# Patient Record
Sex: MALE | Race: WHITE | NOT HISPANIC OR LATINO | Employment: OTHER | URBAN - METROPOLITAN AREA
[De-identification: names, ages, dates, MRNs, and addresses within clinical notes are randomized per-mention and may not be internally consistent; named-entity substitution may affect disease eponyms.]

---

## 2022-03-14 RX ORDER — SODIUM, POTASSIUM,MAG SULFATES 17.5-3.13G
SOLUTION, RECONSTITUTED, ORAL ORAL
Qty: 354 ML | Refills: 0 | Status: SHIPPED | OUTPATIENT
Start: 2022-03-14

## 2022-03-23 ENCOUNTER — OUTSIDE FACILITY SERVICE (OUTPATIENT)
Dept: GASTROENTEROLOGY | Facility: CLINIC | Age: 77
End: 2022-03-23

## 2022-03-23 PROCEDURE — 88305 TISSUE EXAM BY PATHOLOGIST: CPT | Performed by: INTERNAL MEDICINE

## 2022-03-23 PROCEDURE — 45385 COLONOSCOPY W/LESION REMOVAL: CPT | Performed by: INTERNAL MEDICINE

## 2022-03-24 ENCOUNTER — LAB REQUISITION (OUTPATIENT)
Dept: LAB | Facility: HOSPITAL | Age: 77
End: 2022-03-24

## 2022-03-24 DIAGNOSIS — D12.2 BENIGN NEOPLASM OF ASCENDING COLON: ICD-10-CM

## 2022-03-24 DIAGNOSIS — D12.5 BENIGN NEOPLASM OF SIGMOID COLON: ICD-10-CM

## 2022-03-24 DIAGNOSIS — Z12.11 ENCOUNTER FOR SCREENING FOR MALIGNANT NEOPLASM OF COLON: ICD-10-CM

## 2022-03-24 DIAGNOSIS — K64.8 OTHER HEMORRHOIDS: ICD-10-CM

## 2022-03-24 DIAGNOSIS — Z86.010 PERSONAL HISTORY OF COLONIC POLYPS: ICD-10-CM

## 2022-03-26 LAB
CYTO UR: NORMAL
LAB AP CASE REPORT: NORMAL
LAB AP CLINICAL INFORMATION: NORMAL
PATH REPORT.FINAL DX SPEC: NORMAL
PATH REPORT.GROSS SPEC: NORMAL

## 2022-08-30 ENCOUNTER — OFFICE VISIT (OUTPATIENT)
Dept: UROLOGY | Facility: CLINIC | Age: 77
End: 2022-08-30

## 2022-08-30 VITALS
BODY MASS INDEX: 22.1 KG/M2 | WEIGHT: 145.8 LBS | TEMPERATURE: 98.7 F | HEART RATE: 80 BPM | OXYGEN SATURATION: 99 % | HEIGHT: 68 IN | SYSTOLIC BLOOD PRESSURE: 126 MMHG | DIASTOLIC BLOOD PRESSURE: 80 MMHG

## 2022-08-30 DIAGNOSIS — R97.20 ELEVATED PROSTATE SPECIFIC ANTIGEN (PSA): Primary | ICD-10-CM

## 2022-08-30 LAB
BILIRUB BLD-MCNC: NEGATIVE MG/DL
CLARITY, POC: CLEAR
COLOR UR: ABNORMAL
EXPIRATION DATE: ABNORMAL
GLUCOSE UR STRIP-MCNC: NEGATIVE MG/DL
KETONES UR QL: ABNORMAL
LEUKOCYTE EST, POC: NEGATIVE
Lab: ABNORMAL
NITRITE UR-MCNC: NEGATIVE MG/ML
PH UR: 6 [PH] (ref 5–8)
PROT UR STRIP-MCNC: ABNORMAL MG/DL
RBC # UR STRIP: NEGATIVE /UL
SP GR UR: 1.02 (ref 1–1.03)
UROBILINOGEN UR QL: NORMAL

## 2022-08-30 PROCEDURE — 99204 OFFICE O/P NEW MOD 45 MIN: CPT | Performed by: PHYSICIAN ASSISTANT

## 2022-08-30 PROCEDURE — 51798 US URINE CAPACITY MEASURE: CPT | Performed by: PHYSICIAN ASSISTANT

## 2022-08-30 PROCEDURE — 81003 URINALYSIS AUTO W/O SCOPE: CPT | Performed by: PHYSICIAN ASSISTANT

## 2022-08-30 RX ORDER — LOSARTAN POTASSIUM 25 MG/1
25 TABLET ORAL DAILY
COMMUNITY
Start: 2022-08-15

## 2022-08-30 RX ORDER — CIPROFLOXACIN 500 MG/1
500 TABLET, FILM COATED ORAL 2 TIMES DAILY
Qty: 6 TABLET | Refills: 0 | Status: SHIPPED | OUTPATIENT
Start: 2022-08-30

## 2022-08-30 RX ORDER — TRIAMCINOLONE ACETONIDE 0.25 MG/G
CREAM TOPICAL
COMMUNITY
Start: 2022-06-15

## 2022-08-30 RX ORDER — MAGNESIUM HYDROXIDE 1200 MG/15ML
1 LIQUID ORAL AS NEEDED
Qty: 1 ENEMA | Refills: 0 | Status: SHIPPED | OUTPATIENT
Start: 2022-08-30

## 2022-08-30 RX ORDER — CEPHALEXIN 500 MG/1
500 CAPSULE ORAL 2 TIMES DAILY
Qty: 6 CAPSULE | Refills: 0 | Status: SHIPPED | OUTPATIENT
Start: 2022-08-30 | End: 2022-09-02

## 2022-09-29 ENCOUNTER — PROCEDURE VISIT (OUTPATIENT)
Dept: UROLOGY | Facility: CLINIC | Age: 77
End: 2022-09-29

## 2022-09-29 DIAGNOSIS — R97.20 ELEVATED PROSTATE SPECIFIC ANTIGEN (PSA): Primary | ICD-10-CM

## 2022-09-29 PROCEDURE — 55700 PR PROSTATE NEEDLE BIOPSY ANY APPROACH: CPT | Performed by: UROLOGY

## 2022-09-29 PROCEDURE — 76942 ECHO GUIDE FOR BIOPSY: CPT | Performed by: UROLOGY

## 2022-09-29 NOTE — PROGRESS NOTES
TRUS BIOPSY OF PROSTATE    Preoperative diagnosis  Elevated PSA    Postoperative diagnosis  Elevated PSA    Procedure  1.  Transrectal ultrasound of the prostate  2.  Transrectal ultrasound guidance of needle biopsy  3.  Prostate biopsy    Attending Surgeon  Vasiliy Vega MD    Anesthesia  2% lidocaine jelly, intrarectal instillation, 10mL  1% lidocaine solution, periprostatic injection, 10mL    Complications  None    Specimen  Prostate biopsy x 14    Indications  Mr. Howard is a 76 y.o. year old male with an elevated PSA: No results found for: PSA        After discussing his options, the patient decided to proceed with prostate biopsy.  Informed consent was obtained. Possible complications were discussed with the patient during his last visit including, but not limited to, hematuria, hematochezia, prostatitis, urinary tract infection, sepsis, and urinary retention.  He did start the prescribed oral antibiotic regimen.    Procedure  The patient was positioned and prepped in a left lateral position with lower extremities flexed.  Lidocaine jelly, 2%, was injected per rectum. A digital rectal exam was performed.The gopogo E8CS rectal ultrasound probe was slowly introduced into the rectum without difficulty.  The prostate and seminal vesicles were inspected systematically using cross and sagittal views with the ultrasound.  The dimensions of the prostate were measured, for a calculated volume of 36 mL.  Using a true cut 14 Fr biopsy needle, 14 prostate cores were collected. The specific locations were the following: left lateral base, left lateral mid, left lateral apex, left medial base, left medial mid, and left medial apex, right lateral base, right lateral mid, right lateral apex, right medial base, right medial mid, right medial apex, and right and left transition zones. The rectal ultrasound probe was removed.  A URI was again performed revealing little blood in the rectum.  The patient tolerated the procedure  well.    Plan  1.  The patient was instructed to drink plenty of fluids and warned about possible complications and side effects including, but not limited to, blood in the urine, stool and semen as well as bloodstream infection.  He was instructed to call the office if there are any issues, especially fevers or flu-like symptoms.    2.  Continue antibiotic for a total of 3 days.  3.  The patient will return to clinic in approximately 1 week for discussion of the pathological report.

## 2022-10-07 ENCOUNTER — OFFICE VISIT (OUTPATIENT)
Dept: UROLOGY | Facility: CLINIC | Age: 77
End: 2022-10-07

## 2022-10-07 DIAGNOSIS — C61 PROSTATE CANCER: Primary | ICD-10-CM

## 2022-10-07 PROCEDURE — 99443 PR PHYS/QHP TELEPHONE EVALUATION 21-30 MIN: CPT | Performed by: UROLOGY

## 2022-10-07 NOTE — PROGRESS NOTES
21 minute telephone conversation    DATE COLLECTED      DATE RECEIVED      DATE REPORTED   09/30/2022          09/30/2022         10/03/2022     DIAGNOSIS:   A.   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   PROSTATE, NEEDLE CORE BIOPSY, RIGHT MID:   Prostatic adenocarcinoma, Augusta score 4+4 = 8 (grade group 4),   involving <5% of 1/2 cores   C.   PROSTATE, NEEDLE CORE BIOPSY, RIGHT APEX:   Benign prostate tissue   D.   PROSTATE, NEEDLE CORE BIOPSY, LEFT BASE:   Prostatic adenocarcinoma, Rhina score 4+5 = 9 (grade group 5),   involving 70% and 5% of 2/2 cores   Perineural invasion is present   E.   PROSTATE, NEEDLE CORE BIOPSY, LEFT MID:   Prostatic adenocarcinoma, Augusta score 4+5 = 9 (grade group 5),   involving 75% of 2/2 cores   F.   PROSTATE, NEEDLE CORE BIOPSY, LEFT APEX:   Prostatic adenocarcinoma, Rhina score 4+4 = 8 (grade group 4),   involving 1% of 1/2 cores   G.   PROSTATE, NEEDLE CORE BIOPSY, LEFT TRANSZONE:   Prostatic adenocarcinoma, Augusta score 3+4 = 7 (grade group 2),   involving 20% of 1/1 core   Perineural invasion is focally present   H.   PROSTATE, NEEDLE CORE BIOPSY, RIGHT TRANSZONE:   Benign prostate tissue     No results found for: PSA his PSA from outside hospital was 12 prior to biopsy.    We reviewed the patient's new diagnosis of high risk prostate cancer.  We discussed the neck step is metastatic imaging survey, followed by discussion of treatment options, which we engaged in the beginning of today.  He is somewhat advanced age, but is overall quite healthy.  I think either option of surgery or radiation is reasonable.    CT and bone scan  Referral to radiation oncology Dr. Massey and robotic surgery Dr. Cleveland Jurado at .  Follow-up with me after to discuss decision    You have chosen to receive care through a telephone visit. Do you  consent to use a telephone visit for your medical care today? Yes

## 2022-10-27 ENCOUNTER — HOSPITAL ENCOUNTER (OUTPATIENT)
Dept: NUCLEAR MEDICINE | Facility: HOSPITAL | Age: 77
Discharge: HOME | End: 2022-10-27

## 2022-10-27 ENCOUNTER — HOSPITAL ENCOUNTER (OUTPATIENT)
Dept: CT IMAGING | Facility: HOSPITAL | Age: 77
Discharge: HOME | End: 2022-10-27
Admitting: UROLOGY

## 2022-10-27 DIAGNOSIS — C61 PROSTATE CANCER: ICD-10-CM

## 2022-10-27 PROCEDURE — 0 TECHNETIUM MEDRONATE KIT: Performed by: UROLOGY

## 2022-10-27 PROCEDURE — 72193 CT PELVIS W/DYE: CPT

## 2022-10-27 PROCEDURE — 78306 BONE IMAGING WHOLE BODY: CPT

## 2022-10-27 PROCEDURE — 25010000002 IOPAMIDOL 61 % SOLUTION: Performed by: UROLOGY

## 2022-10-27 PROCEDURE — A9503 TC99M MEDRONATE: HCPCS | Performed by: UROLOGY

## 2022-10-27 RX ORDER — TC 99M MEDRONATE 20 MG/10ML
25 INJECTION, POWDER, LYOPHILIZED, FOR SOLUTION INTRAVENOUS
Status: COMPLETED | OUTPATIENT
Start: 2022-10-27 | End: 2022-10-27

## 2022-10-27 RX ADMIN — IOPAMIDOL 100 ML: 612 INJECTION, SOLUTION INTRAVENOUS at 11:07

## 2022-10-27 RX ADMIN — TC 99M MEDRONATE 25 MILLICURIE: 20 INJECTION, POWDER, LYOPHILIZED, FOR SOLUTION INTRAVENOUS at 10:50

## 2022-11-10 ENCOUNTER — TRANSCRIBE ORDERS (OUTPATIENT)
Dept: LAB | Facility: HOSPITAL | Age: 77
End: 2022-11-10

## 2022-11-10 DIAGNOSIS — Z01.812 PRE-OPERATIVE LABORATORY EXAMINATION: Primary | ICD-10-CM

## 2022-11-23 ENCOUNTER — TRANSCRIBE ORDERS (OUTPATIENT)
Dept: LAB | Facility: HOSPITAL | Age: 77
End: 2022-11-23

## 2022-11-23 DIAGNOSIS — Z01.812 PRE-OPERATIVE LABORATORY EXAMINATION: Primary | ICD-10-CM

## 2022-11-23 DIAGNOSIS — Z20.822 COUGH WITH EXPOSURE TO SEVERE ACUTE RESPIRATORY SYNDROME CORONAVIRUS 2 (SARS-COV-2): ICD-10-CM

## 2022-11-23 DIAGNOSIS — R05.8 COUGH WITH EXPOSURE TO SEVERE ACUTE RESPIRATORY SYNDROME CORONAVIRUS 2 (SARS-COV-2): ICD-10-CM

## 2022-11-28 ENCOUNTER — LAB (OUTPATIENT)
Dept: LAB | Facility: HOSPITAL | Age: 77
End: 2022-11-28

## 2022-11-28 DIAGNOSIS — R05.8 COUGH WITH EXPOSURE TO SEVERE ACUTE RESPIRATORY SYNDROME CORONAVIRUS 2 (SARS-COV-2): ICD-10-CM

## 2022-11-28 DIAGNOSIS — Z01.812 PRE-OPERATIVE LABORATORY EXAMINATION: ICD-10-CM

## 2022-11-28 DIAGNOSIS — Z20.822 COUGH WITH EXPOSURE TO SEVERE ACUTE RESPIRATORY SYNDROME CORONAVIRUS 2 (SARS-COV-2): ICD-10-CM

## 2022-11-28 PROCEDURE — C9803 HOPD COVID-19 SPEC COLLECT: HCPCS

## 2022-11-28 PROCEDURE — U0004 COV-19 TEST NON-CDC HGH THRU: HCPCS

## 2022-11-29 LAB — SARS-COV-2 RNA PNL SPEC NAA+PROBE: NOT DETECTED

## 2023-01-05 LAB — REF LAB TEST METHOD: NORMAL

## 2023-08-21 ENCOUNTER — TRANSCRIBE ORDERS (OUTPATIENT)
Dept: ADMINISTRATIVE | Facility: HOSPITAL | Age: 78
End: 2023-08-21
Payer: MEDICARE

## 2023-08-21 DIAGNOSIS — C61 PROSTATE CANCER: Primary | ICD-10-CM

## 2023-09-12 ENCOUNTER — HOSPITAL ENCOUNTER (OUTPATIENT)
Dept: CT IMAGING | Facility: HOSPITAL | Age: 78
Discharge: HOME | End: 2023-09-12
Payer: MEDICARE

## 2023-09-12 ENCOUNTER — HOSPITAL ENCOUNTER (OUTPATIENT)
Dept: NUCLEAR MEDICINE | Facility: HOSPITAL | Age: 78
Discharge: HOME | End: 2023-09-12
Payer: MEDICARE

## 2023-09-12 DIAGNOSIS — C61 PROSTATE CANCER: ICD-10-CM

## 2023-09-12 PROCEDURE — 0 TECHNETIUM MEDRONATE KIT

## 2023-09-12 PROCEDURE — 78306 BONE IMAGING WHOLE BODY: CPT

## 2023-09-12 PROCEDURE — 71260 CT THORAX DX C+: CPT

## 2023-09-12 PROCEDURE — 25510000001 IOPAMIDOL 61 % SOLUTION

## 2023-09-12 PROCEDURE — 74177 CT ABD & PELVIS W/CONTRAST: CPT

## 2023-09-12 PROCEDURE — A9503 TC99M MEDRONATE: HCPCS

## 2023-09-12 RX ORDER — TC 99M MEDRONATE 20 MG/10ML
26.8 INJECTION, POWDER, LYOPHILIZED, FOR SOLUTION INTRAVENOUS
Status: COMPLETED | OUTPATIENT
Start: 2023-09-12 | End: 2023-09-12

## 2023-09-12 RX ADMIN — IOPAMIDOL 100 ML: 612 INJECTION, SOLUTION INTRAVENOUS at 10:02

## 2023-09-12 RX ADMIN — TC 99M MEDRONATE 26.8 MILLICURIE: 20 INJECTION, POWDER, LYOPHILIZED, FOR SOLUTION INTRAVENOUS at 10:12

## 2024-01-03 DIAGNOSIS — C61 PROSTATE CANCER: Primary | ICD-10-CM

## 2024-01-05 ENCOUNTER — TELEPHONE (OUTPATIENT)
Dept: UROLOGY | Facility: CLINIC | Age: 79
End: 2024-01-05
Payer: MEDICARE

## 2024-01-05 NOTE — TELEPHONE ENCOUNTER
Caller: LINSEY HEREDIA    Relationship: SELF     Best call back number: [phone]    Patient is needing: PT SAYS HE WAS TOLD BY HIS PCP TO ADVISE YOU ALL THAT HE NEEDS TO GET A HORMONE SHOT FROM YOU ALL AT HIS UPCOMING APPT.

## 2024-02-08 ENCOUNTER — OFFICE VISIT (OUTPATIENT)
Dept: UROLOGY | Facility: CLINIC | Age: 79
End: 2024-02-08
Payer: MEDICARE

## 2024-02-08 ENCOUNTER — LAB (OUTPATIENT)
Dept: LAB | Facility: HOSPITAL | Age: 79
End: 2024-02-08
Payer: MEDICARE

## 2024-02-08 VITALS
OXYGEN SATURATION: 98 % | HEIGHT: 68 IN | BODY MASS INDEX: 21.52 KG/M2 | TEMPERATURE: 97.5 F | RESPIRATION RATE: 16 BRPM | DIASTOLIC BLOOD PRESSURE: 82 MMHG | HEART RATE: 68 BPM | SYSTOLIC BLOOD PRESSURE: 132 MMHG | WEIGHT: 142 LBS

## 2024-02-08 DIAGNOSIS — N52.9 ERECTILE DYSFUNCTION, UNSPECIFIED ERECTILE DYSFUNCTION TYPE: ICD-10-CM

## 2024-02-08 DIAGNOSIS — C61 PROSTATE CANCER: Primary | ICD-10-CM

## 2024-02-08 PROCEDURE — 84153 ASSAY OF PSA TOTAL: CPT | Performed by: UROLOGY

## 2024-02-08 RX ORDER — LISINOPRIL 20 MG/1
1 TABLET ORAL DAILY
COMMUNITY

## 2024-02-08 RX ORDER — ASPIRIN 81 MG/1
TABLET, CHEWABLE ORAL
COMMUNITY

## 2024-02-08 RX ORDER — SILDENAFIL 100 MG/1
100 TABLET, FILM COATED ORAL AS NEEDED
Qty: 30 TABLET | Refills: 11 | Status: SHIPPED | OUTPATIENT
Start: 2024-02-08

## 2024-02-08 NOTE — PROGRESS NOTES
"CC  Prostate cancer    HPI  Mr. Howard is a 78 y.o. male with history below in assessment, who presents for follow up.     At this visit energy is good, eating ok. He says he has \"somewhat\" erections but aren't hard enough to penetrate. Sex drive is good. No BANDAR.  Only had one Lupron shot in November.     Past Medical History:   Diagnosis Date    Elevated PSA        History reviewed. No pertinent surgical history.      Current Outpatient Medications:     losartan (COZAAR) 25 MG tablet, Take 1 tablet by mouth Daily., Disp: , Rfl:     metFORMIN (GLUCOPHAGE) 1000 MG tablet, Take 1 tablet by mouth., Disp: , Rfl:     triamcinolone (KENALOG) 0.025 % cream, APPLY TOPICALLY TO THE AFFECTED AREA 2 TO 3 TIMES DAILY, Disp: , Rfl:     aspirin 81 MG chewable tablet, Chew 1 tablet every day by oral route., Disp: , Rfl:     ciprofloxacin (Cipro) 500 MG tablet, Take 1 tablet by mouth 2 (Two) Times a Day. Start taking the day prior to biopsy (Patient not taking: Reported on 2/8/2024), Disp: 6 tablet, Rfl: 0    lisinopril (PRINIVIL,ZESTRIL) 20 MG tablet, Take 1 tablet by mouth Daily., Disp: , Rfl:     sodium phosphate (FLEET) 7-19 GM/118ML enema, Insert 1 enema into the rectum As Needed (2-4 hours prior to biopsy). Perform the morning before the biopsy (Patient not taking: Reported on 2/8/2024), Disp: 1 enema, Rfl: 0    sodium-potassium-magnesium sulfates (Suprep Bowel Prep Kit) 17.5-3.13-1.6 GM/177ML solution oral solution, Use as directed by provider for colonoscopy prep (Patient not taking: Reported on 2/8/2024), Disp: 354 mL, Rfl: 0     Physical Exam  Visit Vitals  /82   Pulse 68   Temp 97.5 °F (36.4 °C) (Temporal)   Resp 16   Ht 172.7 cm (68\")   Wt 64.4 kg (142 lb)   SpO2 98%   BMI 21.59 kg/m²       Labs  Brief Urine Lab Results       None            No results found for: \"GLUCOSE\", \"CALCIUM\", \"NA\", \"K\", \"CO2\", \"CL\", \"BUN\", \"CREATININE\", \"EGFRIFAFRI\", \"EGFRIFNONA\", \"BCR\", \"ANIONGAP\"    Lab Results   Component Value Date "    WBC 9.67 11/30/2022    HGB 12.0 (L) 11/30/2022    HCT 34.2 (L) 11/30/2022     (H) 11/30/2022     (L) 11/30/2022          Lab Results   Component Value Date    PSA 0.02 01/03/2024    PSA 0.18 05/10/2023    PSA 0.11 02/08/2023           Radiographic Studies  No Images in the past 120 days found..    I have reviewed above labs and imaging.     Assessment  78 y.o. male with high risk prostate cancer, diagnosed by me in October 2022.  I referred him to Dr. Jurado and he is status post radical prostatectomy with bilateral pelvic lymphadenectomy 11/29/2022, pathology pT3a N1, GG5 with negative margins. His initial post op PSA was detectable and he received an injection of Lupron in November and SBRT to the femoral neck lesion (seen on PSMA PET) only, and did not receive whole pelvis radiation. Minimal LUTS, no BANDAR, significant ED.     Doing well overall.  He had a PSA drawn today.  Energy and sex drive are good.    Plan  1. Schedule FU visit for 6 mo Lupron shot and discuss most recent PSA  2. Trial of sildenafil, though I discussed with him that we would likely need to do ICI or IPP for him to have satisfactory erections for intercourse with his girlfriend.  3.  I encouraged him to stop smoking cigarettes, but said he could keep drinking bourbon on occasion

## 2024-02-22 ENCOUNTER — OFFICE VISIT (OUTPATIENT)
Dept: UROLOGY | Facility: CLINIC | Age: 79
End: 2024-02-22
Payer: MEDICARE

## 2024-02-22 VITALS
DIASTOLIC BLOOD PRESSURE: 84 MMHG | WEIGHT: 142 LBS | HEIGHT: 68 IN | SYSTOLIC BLOOD PRESSURE: 118 MMHG | BODY MASS INDEX: 21.52 KG/M2 | OXYGEN SATURATION: 98 % | HEART RATE: 102 BPM

## 2024-02-22 DIAGNOSIS — N52.9 ERECTILE DYSFUNCTION, UNSPECIFIED ERECTILE DYSFUNCTION TYPE: ICD-10-CM

## 2024-02-22 DIAGNOSIS — C61 PROSTATE CANCER: Primary | ICD-10-CM

## 2024-02-22 RX ORDER — CAL/D3/MAG11/ZINC/COP/MANG/BOR 600 MG-800
1 TABLET ORAL DAILY
Qty: 90 TABLET | Refills: 4 | Status: SHIPPED | OUTPATIENT
Start: 2024-02-22

## 2024-02-22 NOTE — PROGRESS NOTES
"CC  Prostate cancer    HPI  Mr. Howard is a 78 y.o. male with history below in assessment, who presents for follow up.     At this visit energy is good, eating ok. He says he has \"somewhat\" erections but aren't hard enough to penetrate. Sex drive is good. No BANDAR.  Only had one Lupron shot in November.     Past Medical History:   Diagnosis Date    Elevated PSA        History reviewed. No pertinent surgical history.      Current Outpatient Medications:     aspirin 81 MG chewable tablet, Chew 1 tablet every day by oral route., Disp: , Rfl:     lisinopril (PRINIVIL,ZESTRIL) 20 MG tablet, Take 1 tablet by mouth Daily., Disp: , Rfl:     losartan (COZAAR) 25 MG tablet, Take 1 tablet by mouth Daily., Disp: , Rfl:     metFORMIN (GLUCOPHAGE) 1000 MG tablet, Take 1 tablet by mouth., Disp: , Rfl:     sildenafil (Viagra) 100 MG tablet, Take 1 tablet by mouth As Needed for Erectile Dysfunction (1 hr prior prior to intercourse w empty stomach)., Disp: 30 tablet, Rfl: 11    triamcinolone (KENALOG) 0.025 % cream, APPLY TOPICALLY TO THE AFFECTED AREA 2 TO 3 TIMES DAILY, Disp: , Rfl:     ciprofloxacin (Cipro) 500 MG tablet, Take 1 tablet by mouth 2 (Two) Times a Day. Start taking the day prior to biopsy (Patient not taking: Reported on 2/8/2024), Disp: 6 tablet, Rfl: 0    sodium phosphate (FLEET) 7-19 GM/118ML enema, Insert 1 enema into the rectum As Needed (2-4 hours prior to biopsy). Perform the morning before the biopsy (Patient not taking: Reported on 2/8/2024), Disp: 1 enema, Rfl: 0    sodium-potassium-magnesium sulfates (Suprep Bowel Prep Kit) 17.5-3.13-1.6 GM/177ML solution oral solution, Use as directed by provider for colonoscopy prep (Patient not taking: Reported on 2/8/2024), Disp: 354 mL, Rfl: 0     Physical Exam  Visit Vitals  /84   Pulse 102   Ht 172.7 cm (68\")   Wt 64.4 kg (142 lb)   SpO2 98%   BMI 21.59 kg/m²       Labs  Brief Urine Lab Results       None            No results found for: \"GLUCOSE\", \"CALCIUM\", " "\"NA\", \"K\", \"CO2\", \"CL\", \"BUN\", \"CREATININE\", \"EGFRIFAFRI\", \"EGFRIFNONA\", \"BCR\", \"ANIONGAP\"    Lab Results   Component Value Date    WBC 9.67 11/30/2022    HGB 12.0 (L) 11/30/2022    HCT 34.2 (L) 11/30/2022     (H) 11/30/2022     (L) 11/30/2022        Lab Results   Component Value Date    PSA <0.014 02/08/2024    PSA 0.02 01/03/2024    PSA 0.18 05/10/2023           Radiographic Studies  No Images in the past 120 days found..    I have reviewed above labs and imaging.     Assessment  78 y.o. male with high risk prostate cancer, diagnosed by me in October 2022.  I referred him to Dr. Jurado and he is status post radical prostatectomy with bilateral pelvic lymphadenectomy 11/29/2022, pathology pT3a N1, GG5 with negative margins. His initial post op PSA was detectable and he received an injection of Lupron in November and SBRT to the femoral neck lesion (seen on PSMA PET) only, and did not receive whole pelvis radiation. Minimal LUTS, no BANDAR, significant ED.     Doing well overall.  He had a PSA drawn at last visit, undetectable.  Energy and sex drive are good.    Plan  1.  6 mo Lupron shot today. FU in 6 mo w/ PSA prior  2.  Trial of sildenafil, though I discussed with him that we would likely need to do ICI or IPP for him to have satisfactory erections for intercourse with his girlfriend.  3.  I encouraged him to stop smoking cigarettes, but said he could keep drinking bourbon on occasion  4.  Calcium supplement     "

## 2024-02-22 NOTE — PROGRESS NOTES
45 mg lupron given to patient in left hip without any complications or redness to the site. NDC # 6917-5905-63, Lot # [id number], Exp 1/11/2026.     KEYONA Lorenzo

## 2024-03-20 ENCOUNTER — APPOINTMENT (OUTPATIENT)
Dept: CT IMAGING | Facility: HOSPITAL | Age: 79
End: 2024-03-20
Payer: MEDICARE

## 2024-03-20 ENCOUNTER — APPOINTMENT (OUTPATIENT)
Dept: GENERAL RADIOLOGY | Facility: HOSPITAL | Age: 79
End: 2024-03-20
Payer: MEDICARE

## 2024-03-20 ENCOUNTER — HOSPITAL ENCOUNTER (INPATIENT)
Facility: HOSPITAL | Age: 79
LOS: 6 days | Discharge: HOME HEALTH | End: 2024-03-27
Attending: EMERGENCY MEDICINE | Admitting: INTERNAL MEDICINE
Payer: MEDICARE

## 2024-03-20 DIAGNOSIS — Z85.46 HISTORY OF PROSTATE CANCER: ICD-10-CM

## 2024-03-20 DIAGNOSIS — R41.82 ALTERED MENTAL STATUS, UNSPECIFIED ALTERED MENTAL STATUS TYPE: Primary | ICD-10-CM

## 2024-03-20 DIAGNOSIS — R47.01 APHASIA: ICD-10-CM

## 2024-03-20 DIAGNOSIS — Z78.9 ALCOHOL USE: ICD-10-CM

## 2024-03-20 DIAGNOSIS — R13.10 DYSPHAGIA, UNSPECIFIED TYPE: ICD-10-CM

## 2024-03-20 DIAGNOSIS — R47.9 DIFFICULTY WITH SPEECH: ICD-10-CM

## 2024-03-20 DIAGNOSIS — E44.0 MODERATE MALNUTRITION: ICD-10-CM

## 2024-03-20 DIAGNOSIS — G93.41 ACUTE METABOLIC ENCEPHALOPATHY: ICD-10-CM

## 2024-03-20 PROBLEM — F10.90 ALCOHOL USE: Status: ACTIVE | Noted: 2024-03-20

## 2024-03-20 PROBLEM — I10 HTN (HYPERTENSION): Status: ACTIVE | Noted: 2024-03-20

## 2024-03-20 LAB
ALT SERPL W P-5'-P-CCNC: 27 U/L (ref 1–41)
APTT PPP: 28 SECONDS (ref 22–39)
AST SERPL-CCNC: 27 U/L (ref 1–40)
B PARAPERT DNA SPEC QL NAA+PROBE: NOT DETECTED
B PERT DNA SPEC QL NAA+PROBE: NOT DETECTED
BACTERIA UR QL AUTO: ABNORMAL /HPF
BASOPHILS # BLD AUTO: 0.03 10*3/MM3 (ref 0–0.2)
BASOPHILS NFR BLD AUTO: 0.4 % (ref 0–1.5)
BILIRUB UR QL STRIP: NEGATIVE
BUN BLDA-MCNC: 16 MG/DL (ref 8–26)
C PNEUM DNA NPH QL NAA+NON-PROBE: NOT DETECTED
CA-I BLDA-SCNC: 1.22 MMOL/L (ref 1.2–1.32)
CHLORIDE BLDA-SCNC: 99 MMOL/L (ref 98–109)
CLARITY UR: CLEAR
CO2 BLDA-SCNC: 22 MMOL/L (ref 24–29)
COLOR UR: YELLOW
CREAT BLDA-MCNC: 0.9 MG/DL (ref 0.6–1.3)
DEPRECATED RDW RBC AUTO: 44.9 FL (ref 37–54)
EGFRCR SERPLBLD CKD-EPI 2021: 87.4 ML/MIN/1.73
EOSINOPHIL # BLD AUTO: 0.01 10*3/MM3 (ref 0–0.4)
EOSINOPHIL NFR BLD AUTO: 0.1 % (ref 0.3–6.2)
ERYTHROCYTE [DISTWIDTH] IN BLOOD BY AUTOMATED COUNT: 11.9 % (ref 12.3–15.4)
ETHANOL BLD-MCNC: <10 MG/DL (ref 0–10)
FLUAV SUBTYP SPEC NAA+PROBE: NOT DETECTED
FLUBV RNA ISLT QL NAA+PROBE: NOT DETECTED
GLUCOSE BLDC GLUCOMTR-MCNC: 144 MG/DL (ref 70–130)
GLUCOSE UR STRIP-MCNC: NEGATIVE MG/DL
HADV DNA SPEC NAA+PROBE: NOT DETECTED
HCOV 229E RNA SPEC QL NAA+PROBE: NOT DETECTED
HCOV HKU1 RNA SPEC QL NAA+PROBE: NOT DETECTED
HCOV NL63 RNA SPEC QL NAA+PROBE: NOT DETECTED
HCOV OC43 RNA SPEC QL NAA+PROBE: NOT DETECTED
HCT VFR BLD AUTO: 40.1 % (ref 37.5–51)
HCT VFR BLDA CALC: 43 % (ref 38–51)
HGB BLD-MCNC: 14.1 G/DL (ref 13–17.7)
HGB BLDA-MCNC: 14.6 G/DL (ref 12–17)
HGB UR QL STRIP.AUTO: ABNORMAL
HMPV RNA NPH QL NAA+NON-PROBE: NOT DETECTED
HOLD SPECIMEN: NORMAL
HPIV1 RNA ISLT QL NAA+PROBE: NOT DETECTED
HPIV2 RNA SPEC QL NAA+PROBE: NOT DETECTED
HPIV3 RNA NPH QL NAA+PROBE: NOT DETECTED
HPIV4 P GENE NPH QL NAA+PROBE: NOT DETECTED
HYALINE CASTS UR QL AUTO: ABNORMAL /LPF
IMM GRANULOCYTES # BLD AUTO: 0.03 10*3/MM3 (ref 0–0.05)
IMM GRANULOCYTES NFR BLD AUTO: 0.4 % (ref 0–0.5)
KETONES UR QL STRIP: ABNORMAL
LEUKOCYTE ESTERASE UR QL STRIP.AUTO: NEGATIVE
LYMPHOCYTES # BLD AUTO: 1.23 10*3/MM3 (ref 0.7–3.1)
LYMPHOCYTES NFR BLD AUTO: 15.1 % (ref 19.6–45.3)
M PNEUMO IGG SER IA-ACNC: NOT DETECTED
MCH RBC QN AUTO: 36.6 PG (ref 26.6–33)
MCHC RBC AUTO-ENTMCNC: 35.2 G/DL (ref 31.5–35.7)
MCV RBC AUTO: 104.2 FL (ref 79–97)
MONOCYTES # BLD AUTO: 1.22 10*3/MM3 (ref 0.1–0.9)
MONOCYTES NFR BLD AUTO: 15 % (ref 5–12)
NEUTROPHILS NFR BLD AUTO: 5.62 10*3/MM3 (ref 1.7–7)
NEUTROPHILS NFR BLD AUTO: 69 % (ref 42.7–76)
NITRITE UR QL STRIP: NEGATIVE
NRBC BLD AUTO-RTO: 0 /100 WBC (ref 0–0.2)
PH UR STRIP.AUTO: 7 [PH] (ref 5–8)
PLATELET # BLD AUTO: 154 10*3/MM3 (ref 140–450)
PMV BLD AUTO: 9.6 FL (ref 6–12)
POTASSIUM BLDA-SCNC: 4.2 MMOL/L (ref 3.5–4.9)
PROT UR QL STRIP: NEGATIVE
RBC # BLD AUTO: 3.85 10*6/MM3 (ref 4.14–5.8)
RBC # UR STRIP: ABNORMAL /HPF
REF LAB TEST METHOD: ABNORMAL
RHINOVIRUS RNA SPEC NAA+PROBE: NOT DETECTED
RSV RNA NPH QL NAA+NON-PROBE: NOT DETECTED
SARS-COV-2 RNA NPH QL NAA+NON-PROBE: NOT DETECTED
SODIUM BLD-SCNC: 133 MMOL/L (ref 138–146)
SP GR UR STRIP: 1.07 (ref 1–1.03)
SQUAMOUS #/AREA URNS HPF: ABNORMAL /HPF
TROPONIN T SERPL HS-MCNC: 11 NG/L
UROBILINOGEN UR QL STRIP: ABNORMAL
WBC # UR STRIP: ABNORMAL /HPF
WBC NRBC COR # BLD AUTO: 8.14 10*3/MM3 (ref 3.4–10.8)
WHOLE BLOOD HOLD COAG: NORMAL
WHOLE BLOOD HOLD SPECIMEN: NORMAL

## 2024-03-20 PROCEDURE — G0378 HOSPITAL OBSERVATION PER HR: HCPCS

## 2024-03-20 PROCEDURE — 25810000003 LACTATED RINGERS PER 1000 ML: Performed by: HOSPITALIST

## 2024-03-20 PROCEDURE — 70498 CT ANGIOGRAPHY NECK: CPT

## 2024-03-20 PROCEDURE — 84484 ASSAY OF TROPONIN QUANT: CPT | Performed by: EMERGENCY MEDICINE

## 2024-03-20 PROCEDURE — 25010000002 MAGNESIUM SULFATE 2 GM/50ML SOLUTION

## 2024-03-20 PROCEDURE — 84450 TRANSFERASE (AST) (SGOT): CPT | Performed by: EMERGENCY MEDICINE

## 2024-03-20 PROCEDURE — 0042T HC CT CEREBRAL PERFUSION W/WO CONTRAST: CPT

## 2024-03-20 PROCEDURE — 81001 URINALYSIS AUTO W/SCOPE: CPT | Performed by: EMERGENCY MEDICINE

## 2024-03-20 PROCEDURE — 85014 HEMATOCRIT: CPT

## 2024-03-20 PROCEDURE — 84207 ASSAY OF VITAMIN B-6: CPT | Performed by: NURSE PRACTITIONER

## 2024-03-20 PROCEDURE — 25010000002 THIAMINE HCL 200 MG/2ML SOLUTION: Performed by: HOSPITALIST

## 2024-03-20 PROCEDURE — 0202U NFCT DS 22 TRGT SARS-COV-2: CPT | Performed by: EMERGENCY MEDICINE

## 2024-03-20 PROCEDURE — 99222 1ST HOSP IP/OBS MODERATE 55: CPT | Performed by: HOSPITALIST

## 2024-03-20 PROCEDURE — 82077 ASSAY SPEC XCP UR&BREATH IA: CPT | Performed by: NURSE PRACTITIONER

## 2024-03-20 PROCEDURE — 99285 EMERGENCY DEPT VISIT HI MDM: CPT

## 2024-03-20 PROCEDURE — 70450 CT HEAD/BRAIN W/O DYE: CPT

## 2024-03-20 PROCEDURE — 80307 DRUG TEST PRSMV CHEM ANLYZR: CPT | Performed by: PSYCHIATRY & NEUROLOGY

## 2024-03-20 PROCEDURE — 71045 X-RAY EXAM CHEST 1 VIEW: CPT

## 2024-03-20 PROCEDURE — 70496 CT ANGIOGRAPHY HEAD: CPT

## 2024-03-20 PROCEDURE — 25810000003 SODIUM CHLORIDE 0.9 % SOLUTION: Performed by: HOSPITALIST

## 2024-03-20 PROCEDURE — 25810000003 SODIUM CHLORIDE 0.9 % SOLUTION: Performed by: NURSE PRACTITIONER

## 2024-03-20 PROCEDURE — 25010000002 PROCHLORPERAZINE 10 MG/2ML SOLUTION

## 2024-03-20 PROCEDURE — 85730 THROMBOPLASTIN TIME PARTIAL: CPT | Performed by: EMERGENCY MEDICINE

## 2024-03-20 PROCEDURE — 25510000001 IOPAMIDOL PER 1 ML: Performed by: EMERGENCY MEDICINE

## 2024-03-20 PROCEDURE — 84460 ALANINE AMINO (ALT) (SGPT): CPT | Performed by: EMERGENCY MEDICINE

## 2024-03-20 PROCEDURE — 25010000002 DIPHENHYDRAMINE PER 50 MG

## 2024-03-20 PROCEDURE — 85025 COMPLETE CBC W/AUTO DIFF WBC: CPT | Performed by: EMERGENCY MEDICINE

## 2024-03-20 PROCEDURE — 25010000002 MIDAZOLAM PER 1 MG: Performed by: HOSPITALIST

## 2024-03-20 PROCEDURE — 82746 ASSAY OF FOLIC ACID SERUM: CPT | Performed by: NURSE PRACTITIONER

## 2024-03-20 PROCEDURE — 80047 BASIC METABLC PNL IONIZED CA: CPT

## 2024-03-20 PROCEDURE — 99222 1ST HOSP IP/OBS MODERATE 55: CPT

## 2024-03-20 PROCEDURE — 93005 ELECTROCARDIOGRAM TRACING: CPT | Performed by: EMERGENCY MEDICINE

## 2024-03-20 PROCEDURE — 84425 ASSAY OF VITAMIN B-1: CPT | Performed by: NURSE PRACTITIONER

## 2024-03-20 PROCEDURE — 82607 VITAMIN B-12: CPT | Performed by: NURSE PRACTITIONER

## 2024-03-20 RX ORDER — SODIUM CHLORIDE 0.9 % (FLUSH) 0.9 %
10 SYRINGE (ML) INJECTION EVERY 12 HOURS SCHEDULED
Status: DISCONTINUED | OUTPATIENT
Start: 2024-03-20 | End: 2024-03-25

## 2024-03-20 RX ORDER — PROCHLORPERAZINE EDISYLATE 5 MG/ML
5 INJECTION INTRAMUSCULAR; INTRAVENOUS EVERY 6 HOURS PRN
Status: DISCONTINUED | OUTPATIENT
Start: 2024-03-20 | End: 2024-03-27 | Stop reason: HOSPADM

## 2024-03-20 RX ORDER — SODIUM CHLORIDE 9 MG/ML
40 INJECTION, SOLUTION INTRAVENOUS AS NEEDED
Status: DISCONTINUED | OUTPATIENT
Start: 2024-03-20 | End: 2024-03-25

## 2024-03-20 RX ORDER — SODIUM CHLORIDE 0.9 % (FLUSH) 0.9 %
10 SYRINGE (ML) INJECTION AS NEEDED
Status: DISCONTINUED | OUTPATIENT
Start: 2024-03-20 | End: 2024-03-27 | Stop reason: HOSPADM

## 2024-03-20 RX ORDER — SODIUM CHLORIDE 9 MG/ML
40 INJECTION, SOLUTION INTRAVENOUS AS NEEDED
Status: DISCONTINUED | OUTPATIENT
Start: 2024-03-20 | End: 2024-03-27 | Stop reason: HOSPADM

## 2024-03-20 RX ORDER — DEXTROSE MONOHYDRATE 25 G/50ML
25 INJECTION, SOLUTION INTRAVENOUS
Status: DISCONTINUED | OUTPATIENT
Start: 2024-03-20 | End: 2024-03-22

## 2024-03-20 RX ORDER — MIDAZOLAM HYDROCHLORIDE 1 MG/ML
4 INJECTION INTRAMUSCULAR; INTRAVENOUS
Status: DISCONTINUED | OUTPATIENT
Start: 2024-03-20 | End: 2024-03-22

## 2024-03-20 RX ORDER — MIDAZOLAM HYDROCHLORIDE 1 MG/ML
2 INJECTION INTRAMUSCULAR; INTRAVENOUS
Status: DISCONTINUED | OUTPATIENT
Start: 2024-03-20 | End: 2024-03-22

## 2024-03-20 RX ORDER — BISACODYL 10 MG
10 SUPPOSITORY, RECTAL RECTAL DAILY PRN
Status: DISCONTINUED | OUTPATIENT
Start: 2024-03-20 | End: 2024-03-27 | Stop reason: HOSPADM

## 2024-03-20 RX ORDER — SODIUM CHLORIDE 0.9 % (FLUSH) 0.9 %
10 SYRINGE (ML) INJECTION AS NEEDED
Status: DISCONTINUED | OUTPATIENT
Start: 2024-03-20 | End: 2024-03-25

## 2024-03-20 RX ORDER — ASPIRIN 300 MG/1
300 SUPPOSITORY RECTAL DAILY
Status: DISCONTINUED | OUTPATIENT
Start: 2024-03-20 | End: 2024-03-24

## 2024-03-20 RX ORDER — LORAZEPAM 1 MG/1
2 TABLET ORAL
Status: DISCONTINUED | OUTPATIENT
Start: 2024-03-20 | End: 2024-03-22

## 2024-03-20 RX ORDER — DIPHENHYDRAMINE HYDROCHLORIDE 50 MG/ML
12.5 INJECTION INTRAMUSCULAR; INTRAVENOUS EVERY 6 HOURS
Qty: 8 ML | Refills: 0 | Status: DISCONTINUED | OUTPATIENT
Start: 2024-03-20 | End: 2024-03-20

## 2024-03-20 RX ORDER — PROCHLORPERAZINE MALEATE 5 MG/1
5 TABLET ORAL EVERY 6 HOURS PRN
Status: DISCONTINUED | OUTPATIENT
Start: 2024-03-20 | End: 2024-03-27 | Stop reason: HOSPADM

## 2024-03-20 RX ORDER — LORAZEPAM 1 MG/1
1 TABLET ORAL
Status: DISCONTINUED | OUTPATIENT
Start: 2024-03-20 | End: 2024-03-22

## 2024-03-20 RX ORDER — CLOPIDOGREL BISULFATE 75 MG/1
75 TABLET ORAL DAILY
Status: DISCONTINUED | OUTPATIENT
Start: 2024-03-21 | End: 2024-03-21

## 2024-03-20 RX ORDER — SODIUM CHLORIDE, SODIUM LACTATE, POTASSIUM CHLORIDE, CALCIUM CHLORIDE 600; 310; 30; 20 MG/100ML; MG/100ML; MG/100ML; MG/100ML
100 INJECTION, SOLUTION INTRAVENOUS CONTINUOUS
Status: DISCONTINUED | OUTPATIENT
Start: 2024-03-20 | End: 2024-03-25

## 2024-03-20 RX ORDER — PROCHLORPERAZINE EDISYLATE 5 MG/ML
10 INJECTION INTRAMUSCULAR; INTRAVENOUS EVERY 6 HOURS
Qty: 16 ML | Refills: 0 | Status: DISCONTINUED | OUTPATIENT
Start: 2024-03-20 | End: 2024-03-20

## 2024-03-20 RX ORDER — IBUPROFEN 600 MG/1
1 TABLET ORAL
Status: DISCONTINUED | OUTPATIENT
Start: 2024-03-20 | End: 2024-03-27 | Stop reason: HOSPADM

## 2024-03-20 RX ORDER — THIAMINE HYDROCHLORIDE 100 MG/ML
200 INJECTION, SOLUTION INTRAMUSCULAR; INTRAVENOUS EVERY 8 HOURS SCHEDULED
Status: DISCONTINUED | OUTPATIENT
Start: 2024-03-20 | End: 2024-03-21

## 2024-03-20 RX ORDER — BISACODYL 5 MG/1
5 TABLET, DELAYED RELEASE ORAL DAILY PRN
Status: DISCONTINUED | OUTPATIENT
Start: 2024-03-20 | End: 2024-03-27 | Stop reason: HOSPADM

## 2024-03-20 RX ORDER — ONDANSETRON 4 MG/1
4 TABLET, ORALLY DISINTEGRATING ORAL EVERY 6 HOURS PRN
Status: DISCONTINUED | OUTPATIENT
Start: 2024-03-20 | End: 2024-03-27 | Stop reason: HOSPADM

## 2024-03-20 RX ORDER — PROCHLORPERAZINE 25 MG
25 SUPPOSITORY, RECTAL RECTAL EVERY 12 HOURS PRN
Status: DISCONTINUED | OUTPATIENT
Start: 2024-03-20 | End: 2024-03-27 | Stop reason: HOSPADM

## 2024-03-20 RX ORDER — SODIUM CHLORIDE 0.9 % (FLUSH) 0.9 %
10 SYRINGE (ML) INJECTION EVERY 12 HOURS SCHEDULED
Status: DISCONTINUED | OUTPATIENT
Start: 2024-03-20 | End: 2024-03-27 | Stop reason: HOSPADM

## 2024-03-20 RX ORDER — POLYETHYLENE GLYCOL 3350 17 G/17G
17 POWDER, FOR SOLUTION ORAL DAILY PRN
Status: DISCONTINUED | OUTPATIENT
Start: 2024-03-20 | End: 2024-03-27 | Stop reason: HOSPADM

## 2024-03-20 RX ORDER — LORAZEPAM 1 MG/1
1 TABLET ORAL EVERY 6 HOURS
Status: COMPLETED | OUTPATIENT
Start: 2024-03-21 | End: 2024-03-22

## 2024-03-20 RX ORDER — AMOXICILLIN 250 MG
2 CAPSULE ORAL 2 TIMES DAILY PRN
Status: DISCONTINUED | OUTPATIENT
Start: 2024-03-20 | End: 2024-03-27 | Stop reason: HOSPADM

## 2024-03-20 RX ORDER — NICOTINE POLACRILEX 4 MG
15 LOZENGE BUCCAL
Status: DISCONTINUED | OUTPATIENT
Start: 2024-03-20 | End: 2024-03-22

## 2024-03-20 RX ORDER — LORAZEPAM 1 MG/1
2 TABLET ORAL EVERY 6 HOURS
Status: DISPENSED | OUTPATIENT
Start: 2024-03-20 | End: 2024-03-21

## 2024-03-20 RX ORDER — ATORVASTATIN CALCIUM 40 MG/1
80 TABLET, FILM COATED ORAL NIGHTLY
Status: DISCONTINUED | OUTPATIENT
Start: 2024-03-20 | End: 2024-03-24

## 2024-03-20 RX ORDER — ASPIRIN 81 MG/1
81 TABLET, CHEWABLE ORAL DAILY
Status: DISCONTINUED | OUTPATIENT
Start: 2024-03-20 | End: 2024-03-24

## 2024-03-20 RX ORDER — CLOPIDOGREL BISULFATE 75 MG/1
300 TABLET ORAL ONCE
Qty: 4 TABLET | Refills: 0 | Status: COMPLETED | OUTPATIENT
Start: 2024-03-20 | End: 2024-03-20

## 2024-03-20 RX ORDER — MAGNESIUM SULFATE HEPTAHYDRATE 40 MG/ML
2 INJECTION, SOLUTION INTRAVENOUS EVERY 6 HOURS
Qty: 400 ML | Refills: 0 | Status: DISCONTINUED | OUTPATIENT
Start: 2024-03-20 | End: 2024-03-20

## 2024-03-20 RX ORDER — SODIUM CHLORIDE 0.9 % (FLUSH) 0.9 %
10 SYRINGE (ML) INJECTION AS NEEDED
Status: DISCONTINUED | OUTPATIENT
Start: 2024-03-20 | End: 2024-03-20

## 2024-03-20 RX ADMIN — PROCHLORPERAZINE EDISYLATE 10 MG: 5 INJECTION INTRAMUSCULAR; INTRAVENOUS at 13:51

## 2024-03-20 RX ADMIN — MAGNESIUM SULFATE HEPTAHYDRATE 2 G: 2 INJECTION, SOLUTION INTRAVENOUS at 20:40

## 2024-03-20 RX ADMIN — DIPHENHYDRAMINE HYDROCHLORIDE 12.5 MG: 50 INJECTION INTRAMUSCULAR; INTRAVENOUS at 13:51

## 2024-03-20 RX ADMIN — SODIUM CHLORIDE 500 ML: 9 INJECTION, SOLUTION INTRAVENOUS at 22:05

## 2024-03-20 RX ADMIN — ASPIRIN 81 MG: 81 TABLET, CHEWABLE ORAL at 13:51

## 2024-03-20 RX ADMIN — LORAZEPAM 2 MG: 1 TABLET ORAL at 14:50

## 2024-03-20 RX ADMIN — MIDAZOLAM HYDROCHLORIDE 2 MG: 1 INJECTION, SOLUTION INTRAMUSCULAR; INTRAVENOUS at 23:23

## 2024-03-20 RX ADMIN — THIAMINE HYDROCHLORIDE 200 MG: 100 INJECTION, SOLUTION INTRAMUSCULAR; INTRAVENOUS at 14:51

## 2024-03-20 RX ADMIN — THIAMINE HYDROCHLORIDE 200 MG: 100 INJECTION, SOLUTION INTRAMUSCULAR; INTRAVENOUS at 21:38

## 2024-03-20 RX ADMIN — FOLIC ACID 1 MG: 5 INJECTION, SOLUTION INTRAMUSCULAR; INTRAVENOUS; SUBCUTANEOUS at 14:49

## 2024-03-20 RX ADMIN — Medication 10 ML: at 23:19

## 2024-03-20 RX ADMIN — MAGNESIUM SULFATE HEPTAHYDRATE 2 G: 2 INJECTION, SOLUTION INTRAVENOUS at 13:47

## 2024-03-20 RX ADMIN — SODIUM CHLORIDE, POTASSIUM CHLORIDE, SODIUM LACTATE AND CALCIUM CHLORIDE 100 ML/HR: 600; 310; 30; 20 INJECTION, SOLUTION INTRAVENOUS at 23:18

## 2024-03-20 RX ADMIN — IOPAMIDOL 115 ML: 755 INJECTION, SOLUTION INTRAVENOUS at 13:15

## 2024-03-20 RX ADMIN — CLOPIDOGREL BISULFATE 300 MG: 75 TABLET ORAL at 13:49

## 2024-03-20 RX ADMIN — SODIUM CHLORIDE 500 ML: 9 INJECTION, SOLUTION INTRAVENOUS at 20:40

## 2024-03-20 NOTE — H&P
[facility] Medicine Services  HISTORY AND PHYSICAL    Patient Name: Patrice Howard  : 1945  MRN: [id number]  Primary Care Physician: Delano Song DO  Date of admission: 3/20/2024      Subjective   Subjective     Chief Complaint: AMS    HPI:  Patrice Howard is a 78 y.o. male with history of DM on Metformin, tobacco abuse, daily alcohol use, history of prostate cancer s/p resection, s/p partial XRT, and on Lupron, HTN, here with AMS. Per son, at bedside, he noted that he was altered this AM. Noted HA with N/V last night. Noted chills overnight per son. He's restless and agitated now. Today his son check on him and he had slurred speech with word finding difficulty, noted that he stumbled when he walked with L weakness, and couldn't discern that his cigarettes and lighter had been placed in his L hand. The patient speaks only intermittently. He is oriented to person only. He seems confused. He does note HA with coughing.     The son does note drinking beer and liquor daily    Personal History     Past Medical History:   Diagnosis Date    Diabetes     Elevated PSA     Prostate cancer            History reviewed. No pertinent surgical history.    Family History: NC    Social History:  reports that he has been smoking cigarettes. He has a 2 pack-year smoking history. He has never used smokeless tobacco. He reports current alcohol use of about 4.0 standard drinks of alcohol per week. He reports that he does not use drugs.  Social History     Social History Narrative    Not on file       Medications:  Available home medication information reviewed.  Caltrate 600+D Plus Minerals, aspirin, lisinopril, losartan, metFORMIN, sildenafil, and triamcinolone    No Known Allergies    Objective   Objective     Vital Signs:   Temp:  [98.5 °F (36.9 °C)] 98.5 °F (36.9 °C)  Heart Rate:  [68] 68  Resp:  [12-15] 12  BP: (159)/(85) 159/85  Total (NIH Stroke Scale): 4    Physical Exam   NAD, alert,  oriented to person only  OP clear, dry MM  Neck supple  No LAD  RRR, no obvious murmur  Decreased at bases, otherwise clear  +BS, ND, NT, soft  L facial droop, expressive aphasia, with slurred speech, seemingly LUE weakness 3/5, B LE weakness 4+/5, possibly decreased sensation in LUE, gait not tested      Result Review:  I have personally reviewed the results from the time of this admission to 3/20/2024 14:34 EDT and agree with these findings:  []  Laboratory list / accordion  []  Microbiology  []  Radiology  []  EKG/Telemetry   []  Cardiology/Vascular   []  Pathology  []  Old records  []  Other:  Most notable findings include: CT perfusion study abnormality noted, EKG NSR, UA with WBC 3-5, RBC 3-5, Troponin 11, WBC 8, Hgb 14.1, BMP pending      LAB RESULTS:      Lab 03/20/24  1253 03/20/24  1249   WBC 8.14  --    HEMOGLOBIN 14.1  --    HEMOGLOBIN, POC  --  14.6   HEMATOCRIT 40.1  --    HEMATOCRIT POC  --  43   PLATELETS 154  --    NEUTROS ABS 5.62  --    IMMATURE GRANS (ABS) 0.03  --    LYMPHS ABS 1.23  --    MONOS ABS 1.22*  --    EOS ABS 0.01  --    .2*  --    APTT 28.0  --          Lab 03/20/24  1249   CREATININE 0.90   EGFR 87.4         Lab 03/20/24  1253   ALT (SGPT) 27   AST (SGOT) 27         Lab 03/20/24  1253   HSTROP T 11                 UA          3/20/2024    13:45   Urinalysis   Squamous Epithelial Cells, UA 0-2    Specific Gravity, UA 1.075    Ketones, UA 15 mg/dL (1+)    Blood, UA Small (1+)    Leukocytes, UA Negative    Nitrite, UA Negative    RBC, UA 3-5    WBC, UA 3-5    Bacteria, UA None Seen        Microbiology Results (last 10 days)       ** No results found for the last 240 hours. **            CT Angiogram Head w AI Analysis of LVO    Result Date: 3/20/2024  CT ANGIOGRAM HEAD W AI ANALYSIS OF LVO, CT CEREBRAL PERFUSION W WO CONTRAST, CT ANGIOGRAM NECK Date of Exam: 3/20/2024 1:02 PM EDT Indication: Neuro deficit, acute stroke suspected Neuro deficit, acute stroke suspected. Comparison:  None available. Technique: CTA of the head was performed after the uneventful intravenous administration of . Reconstructed coronal and sagittal images were also obtained. In addition, a 3-D volume rendered image was created for interpretation. Automated exposure control and iterative reconstruction methods were used. FINDINGS: Vascular Findings: Atherosclerotic plaque within the right carotid bifurcation and right carotid siphon. The right common carotid, internal carotid, middle cerebral, anterior cerebral, vertebral, and posterior cerebral arteries are patent without abrupt cut off or aneurysmal dilation. There is absence of the A1 segment of the right anterior cerebral artery. There is fetal origin of the right posterior cerebral artery. Atherosclerotic plaque within the left carotid bifurcation and left carotid siphon. The left common carotid, internal carotid, middle cerebral, anterior cerebral, vertebral, and posterior cerebral arteries are patent without abrupt cut off or aneurysmal dilation. Basilar artery appears patent and appears unremarkable. Non-vascular Findings: For description of nonvascular intracranial findings, please refer to the noncontrast head CT performed the same date. No acute abnormality is identified within the visualized soft tissue or bony structures of the neck. Cervical spondylosis is present. The visualized lung apices are clear. CT Perfusion: CBF (<30%) volume: 0 mL Tmax (>6.0s) volume: 3 mL Mismatch volume: 3 mL Mismatch ratio: Infinite     Impression: 1.No intracranial large vessel occlusion is identified. 2.No significant stenosis of the bilateral internal carotid arteries. 3.CT perfusion study demonstrates a tiny focus of prolonged Tmax greater than 6 seconds (3 mL) within the right occipital lobe. This could be artifactual. Tiny focus of ischemia cannot be excluded. Please correlate with MRI. Electronically Signed: Maurice Lam MD  3/20/2024 1:48 PM EDT  Workstation ID:  [id number]    CT Angiogram Neck    Result Date: 3/20/2024  CT ANGIOGRAM HEAD W AI ANALYSIS OF LVO, CT CEREBRAL PERFUSION W WO CONTRAST, CT ANGIOGRAM NECK Date of Exam: 3/20/2024 1:02 PM EDT Indication: Neuro deficit, acute stroke suspected Neuro deficit, acute stroke suspected. Comparison: None available. Technique: CTA of the head was performed after the uneventful intravenous administration of . Reconstructed coronal and sagittal images were also obtained. In addition, a 3-D volume rendered image was created for interpretation. Automated exposure control and iterative reconstruction methods were used. FINDINGS: Vascular Findings: Atherosclerotic plaque within the right carotid bifurcation and right carotid siphon. The right common carotid, internal carotid, middle cerebral, anterior cerebral, vertebral, and posterior cerebral arteries are patent without abrupt cut off or aneurysmal dilation. There is absence of the A1 segment of the right anterior cerebral artery. There is fetal origin of the right posterior cerebral artery. Atherosclerotic plaque within the left carotid bifurcation and left carotid siphon. The left common carotid, internal carotid, middle cerebral, anterior cerebral, vertebral, and posterior cerebral arteries are patent without abrupt cut off or aneurysmal dilation. Basilar artery appears patent and appears unremarkable. Non-vascular Findings: For description of nonvascular intracranial findings, please refer to the noncontrast head CT performed the same date. No acute abnormality is identified within the visualized soft tissue or bony structures of the neck. Cervical spondylosis is present. The visualized lung apices are clear. CT Perfusion: CBF (<30%) volume: 0 mL Tmax (>6.0s) volume: 3 mL Mismatch volume: 3 mL Mismatch ratio: Infinite     Impression: 1.No intracranial large vessel occlusion is identified. 2.No significant stenosis of the bilateral internal carotid arteries. 3.CT perfusion study  demonstrates a tiny focus of prolonged Tmax greater than 6 seconds (3 mL) within the right occipital lobe. This could be artifactual. Tiny focus of ischemia cannot be excluded. Please correlate with MRI. Electronically Signed: Maurice Lam MD  3/20/2024 1:48 PM EDT  Workstation ID: [id number]    CT CEREBRAL PERFUSION WITH & WITHOUT CONTRAST    Result Date: 3/20/2024  CT ANGIOGRAM HEAD W AI ANALYSIS OF LVO, CT CEREBRAL PERFUSION W WO CONTRAST, CT ANGIOGRAM NECK Date of Exam: 3/20/2024 1:02 PM EDT Indication: Neuro deficit, acute stroke suspected Neuro deficit, acute stroke suspected. Comparison: None available. Technique: CTA of the head was performed after the uneventful intravenous administration of . Reconstructed coronal and sagittal images were also obtained. In addition, a 3-D volume rendered image was created for interpretation. Automated exposure control and iterative reconstruction methods were used. FINDINGS: Vascular Findings: Atherosclerotic plaque within the right carotid bifurcation and right carotid siphon. The right common carotid, internal carotid, middle cerebral, anterior cerebral, vertebral, and posterior cerebral arteries are patent without abrupt cut off or aneurysmal dilation. There is absence of the A1 segment of the right anterior cerebral artery. There is fetal origin of the right posterior cerebral artery. Atherosclerotic plaque within the left carotid bifurcation and left carotid siphon. The left common carotid, internal carotid, middle cerebral, anterior cerebral, vertebral, and posterior cerebral arteries are patent without abrupt cut off or aneurysmal dilation. Basilar artery appears patent and appears unremarkable. Non-vascular Findings: For description of nonvascular intracranial findings, please refer to the noncontrast head CT performed the same date. No acute abnormality is identified within the visualized soft tissue or bony structures of the neck. Cervical spondylosis is  present. The visualized lung apices are clear. CT Perfusion: CBF (<30%) volume: 0 mL Tmax (>6.0s) volume: 3 mL Mismatch volume: 3 mL Mismatch ratio: Infinite     Impression: 1.No intracranial large vessel occlusion is identified. 2.No significant stenosis of the bilateral internal carotid arteries. 3.CT perfusion study demonstrates a tiny focus of prolonged Tmax greater than 6 seconds (3 mL) within the right occipital lobe. This could be artifactual. Tiny focus of ischemia cannot be excluded. Please correlate with MRI. Electronically Signed: Maurice Lam MD  3/20/2024 1:48 PM EDT  Workstation ID: [id number]    XR Chest 1 View    Result Date: 3/20/2024  XR CHEST 1 VW Date of Exam: 3/20/2024 1:18 PM EDT Indication: Acute Stroke Protocol (onset < 12 hrs) Comparison: CT chest September 12, 2023 Findings: The heart not definitely enlarged. The lungs seem clear. There are no pleural effusions. The visualized osseous structures do not appear unusual.     Impression: Impression: 1.An acute pulmonary process is not apparent. Electronically Signed: Roger Herring MD  3/20/2024 1:37 PM EDT  Workstation ID: [id number]    CT Head Without Contrast Stroke Protocol    Result Date: 3/20/2024  CT HEAD WO CONTRAST STROKE PROTOCOL Date of Exam: 3/20/2024 12:59 PM EDT Indication: Neuro deficit, acute, stroke suspected Neuro deficit, acute stroke suspected. Comparison: None available. Technique: Axial CT images were obtained of the head without contrast administration.  Reconstructed coronal images were also obtained. Automated exposure control and iterative construction methods were used. Scan Time: 1256 hours Results discussed with stroke team at 1259 hours. Findings: There is mild atrophy. There is mild periventricular hypodensity compatible with chronic small vessel ischemic insult. There is no acute mass effect or edema. There is calcification of the right vertebral artery at the skull base. There is no acute mass effect or edema.  There are no findings suspicious for acute CVA or hemorrhage. There is moderate polypoid mucosal thickening of the inferior maxillary sinuses.     Impression: Impression: Chronic findings. No acute process. Electronically Signed: Earnestine Ramirez MD  3/20/2024 1:09 PM EDT  Workstation ID: [id number]         Assessment & Plan   Assessment & Plan       Aphasia    Alcohol use    History of prostate cancer    HTN (hypertension)    This is a 78 year old male with history of prostate cancer s/p resection, radiation, on Lupron, HTN, DM, tobacco use, here with AMS    AMS  Possible CVA  -CT perfusion abnormality ordered  -ASA/plavix ordered per stroke team  -statin per neurology team  -MRI pending    Recent N/V/HA  Cough  -respiratory PCR pending    Hx of ETOH use/possible dependence  -thiamine/folate  -benzodiazepines per [facility]WA protocol    Hx of prostate cancer  -s/p resection  -on Lupron    HTN  -permissive per stroke protocol/orders    Hx of DM  -SSI      DVT prophylaxis:  Mechanical DVT prophylaxis orders are signed and held.            CODE STATUS:    Code Status and Medical Interventions:   Ordered at: 03/20/24 1429     Code Status (Patient has no pulse and is not breathing):    CPR (Attempt to Resuscitate)     Medical Interventions (Patient has pulse or is breathing):    Full Support       Expected Discharge   Expected discharge date/ time has not been documented.     Yanick Curtis MD  03/20/24

## 2024-03-20 NOTE — CONSULTS
Stroke Consult Note    Patient Name: Patrice Howard   MRN: [id number]  Age: 78 y.o.  Sex: male  : 1945    Primary Care Physician: Delano Song DO  Referring Physician:  Kayden Hart MD    TIME STROKE TEAM CALLED: 1244 EST     TIME PATIENT SEEN: 1256 EST    Handedness: Right  Race:     Chief Complaint/Reason for Consultation: AMS, dysarthria, unstable gait    HPI: Patrice Howard is a 78-year-old male with PMH of HTN, prostate cancer, T2DM, prior history of stroke (), current tobacco abuse and EtOH abuse who presented to [facility] ED via son driving private vehicle with complaints of altered mental status, slurred speech, and unsteady gait.  Per the patient when he awoke this morning at 9 AM he had the worst headache of his life rated 10/10.  Patient states that he does not normally get migraine headaches.  Patient was slow to respond to questions on my exam with minor word finding difficulty.  Patient denies taking any antiplatelet or anticoagulant medication.  Last known well 2300 on 3/19/2024 as the patient states he felt normal prior to bed.  Patient's son states that over the past month his father has complained of several episodes of being more lightheaded with near syncopal episodes.  Patient denies any changes in urinary frequency, urgency.  States no blood or painful urination.  When questioned about history of prior stroke either patient or son could remember specifics concerning symptoms of prior stroke or any lasting residual effects.     BP on arrival 159/85.  NIH 4 for dysarthria, minor aphasia, bilateral peripheral visual deficits, and a slight facial droop detected on the right side.  Emergent CT head without hemorrhage or acute abnormality identified.  CT angiogram does not demonstrate large vessel occlusion. Patient not a candidate for IV thrombolytics 2/2 last known well.  No identifiable LVO for neuro intervention. ICAD evidenced on CTA    Last Known Normal Date/Time:  2300 EST 3/19/24    Review of Systems   Constitutional:  Positive for fatigue. Negative for fever.   HENT:  Negative for congestion, sinus pressure and sinus pain.    Eyes:  Positive for visual disturbance. Negative for photophobia.   Respiratory:  Positive for cough.    Cardiovascular:  Negative for chest pain and palpitations.   Gastrointestinal:  Negative for blood in stool, nausea and vomiting.   Genitourinary: Negative.    Musculoskeletal: Negative.    Neurological:  Positive for speech difficulty, weakness and headaches. Negative for dizziness, tremors, syncope, light-headedness and numbness.   Psychiatric/Behavioral: Negative.        Past Medical History:   Diagnosis Date    Elevated PSA      No past surgical history on file.  No family history on file.  Social History     Socioeconomic History    Marital status: Single   Tobacco Use    Smoking status: Every Day     Current packs/day: 1.00     Average packs/day: 1 pack/day for 2.0 years (2.0 ttl pk-yrs)     Types: Cigarettes    Smokeless tobacco: Never   Vaping Use    Vaping status: Never Used   Substance and Sexual Activity    Alcohol use: Yes    Drug use: Never    Sexual activity: Defer     No Known Allergies  Prior to Admission medications    Medication Sig Start Date End Date Taking? Authorizing Provider   aspirin 81 MG chewable tablet Chew 1 tablet every day by oral route.    Grazyna Car MD   Calcium Carbonate-Vit D-Min (Caltrate 600+D Plus Minerals) 600-800 MG-UNIT tablet Take 1 tablet by mouth Daily. 2/22/24   Vasiliy Vega MD   lisinopril (PRINIVIL,ZESTRIL) 20 MG tablet Take 1 tablet by mouth Daily.    Grazyna Car MD   losartan (COZAAR) 25 MG tablet Take 1 tablet by mouth Daily. 8/15/22   Grazyna Car MD   metFORMIN (GLUCOPHAGE) 1000 MG tablet Take 1 tablet by mouth.    Grazyna Car MD   sildenafil (Viagra) 100 MG tablet Take 1 tablet by mouth As Needed for Erectile Dysfunction (1 hr prior prior to  intercourse w empty stomach). 2/8/24   Vasiliy Vega MD   triamcinolone (KENALOG) 0.025 % cream APPLY TOPICALLY TO THE AFFECTED AREA 2 TO 3 TIMES DAILY 6/15/22   Provider, MD Grazyna         Temp:  [98.5 °F (36.9 °C)] 98.5 °F (36.9 °C)  Heart Rate:  [68] 68  Resp:  [12-15] 12  BP: (159)/(85) 159/85  Neurological Exam  Mental Status  Alert. Oriented only to person, place and time. Oriented to person, place, and time. Mild dysarthria present. Expressive aphasia present. Able to name objects, name parts of objects and repeat. Follows three-step commands.    Cranial Nerves  CN II: Bitemporal hemianopsia.  CN III, IV, VI: Extraocular movements intact bilaterally. Normal lids and orbits bilaterally. Pupils equal round and reactive to light bilaterally.  CN V: Facial sensation is normal.  CN VII:  Right: There is central facial weakness.  Left: There is no facial weakness.  CN XI: Shoulder shrug strength is normal.  CN XII: Tongue midline without atrophy or fasciculations.    Motor  Decreased muscle bulk throughout. No fasciculations present. Normal muscle tone.  3+/5 bilateral lower extremities.   4/5 bilateral upper extremities  .    Sensory  Light touch is normal in upper and lower extremities.     Coordination  Right: Finger-to-nose normal.Left: Finger-to-nose normal.    Gait   Abnormal gait.  Patient observed transferring from wheelchair to scanner with widened stance and requiring assistance.      Physical Exam  Constitutional:       General: He is not in acute distress.  HENT:      Head: Normocephalic and atraumatic.      Mouth/Throat:      Mouth: Mucous membranes are dry.      Pharynx: Oropharynx is clear.   Eyes:      General: Lids are normal.      Extraocular Movements: Extraocular movements intact.      Pupils: Pupils are equal, round, and reactive to light.   Cardiovascular:      Rate and Rhythm: Normal rate.   Pulmonary:      Effort: No respiratory distress.   Abdominal:      General: There is no  distension.      Tenderness: There is no guarding.   Musculoskeletal:      Right lower leg: No edema.      Left lower leg: No edema.   Skin:     General: Skin is warm.   Neurological:      Mental Status: He is alert and oriented to person, place, and time.      Cranial Nerves: Cranial nerve deficit and dysarthria present.      Sensory: No sensory deficit.      Motor: Weakness present.      Gait: Gait abnormal.         Acute Stroke Data    Thrombolytic Inclusion / Exclusion Criteria    Time: 13:02 EDT  Person Administering Scale: Jose Jimenez PA-C    Inclusion Criteria  [x]   18 years of age or greater   []   Onset of symptoms < 4.5 hours before beginning treatment (stroke onset = time patient was last seen well or without symptoms).   []   Diagnosis of acute ischemic stroke causing measurable disabling deficit (Complete Hemianopia, Any Aphasia, Visual or Sensory Extinction, Any weakness limiting sustained effort against gravity)   []   Any remaining deficit considered potentially disabling in view of patient and practitioner   Exclusion criteria (Do not proceed with Alteplase if any are checked under exclusion criteria)  [x]   Onset unknown or GREATER than 4.5 hours   []   ICH on CT/MRI   []   CT demonstrates hypodensity representing acute or subacute infarct   []   Significant head trauma or prior stroke in the previous 3 months   []   Symptoms suggestive of subarachnoid hemorrhage   []   History of un-ruptured intracranial aneurysm GREATER than 10 mm   []   Recent intracranial or intraspinal surgery within the last 3 months   []   Arterial puncture at a non-compressible site in the previous 7 days   []   Active internal bleeding   []   Acute bleeding tendency   []   Platelet count LESS than 100,000 for known hematological diseases such as leukemia, thrombocytopenia or chronic cirrhosis   []   Current use of anticoagulant with INR GREATER than 1.7 or PT GREATER than 15 seconds, aPTT GREATER than 40 seconds   []    Heparin received within 48 hours, resulting in abnormally elevated aPTT GREATER than upper limit of normal   []   Current use of direct thrombin inhibitors or direct factor Xa inhibitors in the past 48 hours   []   Elevated blood pressure refractory to treatment (systolic GREATER than 185 mm/Hg or diastolic  GREATER than 110 mm/Hg   []   Suspected infective endocarditis and aortic arch dissection   []   Current use of therapeutic treatment dose of low-molecular-weight heparin (LMWH) within the previous 24 hours   []   Structural GI malignancy or bleed   Relative exclusion for all patients  []   Only minor non-disabling symptoms   []   Pregnancy   []   Seizure at onset with postictal residual neurological impairments   []   Major surgery or previous trauma within past 14 days   []   History of previous spontaneous ICH, intracranial neoplasm, or AV malformation   []   Postpartum (within previous 14 days)   []   Recent GI or urinary tract hemorrhage (within previous 21 days)   []   Recent acute MI (within previous 3 months)   []   History of un-ruptured intracranial aneurysm LESS than 10 mm   []   History of ruptured intracranial aneurysm   []   Blood glucose LESS than 50 mg/dL (2.7 mmol/L)   []   Dural puncture within the last 7 days   []   Known GREATER than 10 cerebral microbleeds   Additional exclusions for patients with symptoms onset between 3 and 4.5 hours.  []   Age > 80.   []   On any anticoagulants regardless of INR  >>> Warfarin (Coumadin), Heparin, Enoxaparin (Lovenox), fondaparinux (Arixtra), bivalirudin (Angiomax), Argatroban, dabigatran (Pradaxa), rivaroxaban (Xarelto), or apixaban (Eliquis)   []   Severe stroke (NIHSS > 25).   []   History of BOTH diabetes and previous ischemic stroke.   []   The risks and benefits have been discussed with the patient or family related to the administration of IV thrombolytic therapy for stroke symptoms.   []   I have discussed and reviewed the patient's case and  imaging with the attending prior to IV thrombolytic therapy.   N/A Time IV thrombolytic administered       Hospital Meds:  Scheduled-    Infusions-     PRNs-   sodium chloride    Functional Status Prior to Current Stroke/[place] Score:   MODIFIED CRISTOFER SCALE (to be assessed for each patient having history of stroke) []Stroke history but not assessed  []0: No symptoms at all  [x]1: No significant disability despite symptoms  []2: Slight disability  []3: Moderate disability  []4: Moderately severe disability  []5: Severe disability  []6: Death        NIH Stroke Scale  Time: 13:02 EDT  Person Administering Scale: Jose Jimenez PA-C    1a  Level of consciousness: 0=alert; keenly responsive   1b. LOC questions:  0=Answers both questions correctly   1c. LOC commands: 0=Performs both tasks correctly   2.  Best Gaze: 0=normal   3.  Visual: 1=Partial hemianopia   4. Facial Palsy: 1=Minor paralysis (flattened nasolabial fold, asymmetric on smiling)   5a.  Motor left arm: 0=No drift, limb holds 90 (or 45) degrees for full 10 seconds   5b.  Motor right arm: 0=No drift, limb holds 90 (or 45) degrees for full 10 seconds   6a. motor left le=No drift, limb holds 90 (or 45) degrees for full 10 seconds   6b  Motor right le=No drift, limb holds 90 (or 45) degrees for full 10 seconds   7. Limb Ataxia: 0=Absent   8.  Sensory: 0=Normal; no sensory loss   9. Best Language:  1=Mild to moderate aphasia; some obvious loss of fluency or facility of comprehension without significant limitation on ideas expressed or form of expression.   10. Dysarthria: 1=Mild to moderate, patient slurs at least some words and at worst, can be understood with some difficulty   11. Extinction and Inattention: 0=No abnormality    Total:   4     CBC w/diff          3/20/2024    12:49 3/20/2024    12:53   CBC w/Diff   WBC  8.14    RBC  3.85    Hemoglobin 14.6  14.1    Hematocrit 43  40.1    MCV  104.2    MCH  36.6    MCHC  35.2    RDW  11.9    Platelets   "154    Neutrophil Rel %  69.0    Immature Granulocyte Rel %  0.4    Lymphocyte Rel %  15.1    Monocyte Rel %  15.0    Eosinophil Rel %  0.1    Basophil Rel %  0.4       Basic Metabolic Panel    Sodium No results found for: \"NA\"   Potassium No results found for: \"K\"   Chloride No results found for: \"CL\"   Bicarbonate No results found for: \"PLASMABICARB\"   BUN No results found for: \"BUN\"   Creatinine Creatinine   Date Value Ref Range Status   03/20/2024 0.90 0.60 - 1.30 mg/dL Final      Calcium No results found for: \"CALCIUM\"   Glucose      No components found for: \"GLUCOSE.*\"      CT Angiogram Head w AI Analysis of LVO    Result Date: 3/20/2024  1.No intracranial large vessel occlusion is identified. 2.No significant stenosis of the bilateral internal carotid arteries. 3.CT perfusion study demonstrates a tiny focus of prolonged Tmax greater than 6 seconds (3 mL) within the right occipital lobe. This could be artifactual. Tiny focus of ischemia cannot be excluded. Please correlate with MRI. Electronically Signed: Maurice Lam MD  3/20/2024 1:48 PM EDT  Workstation ID: [id number]    CT Angiogram Neck    Result Date: 3/20/2024  1.No intracranial large vessel occlusion is identified. 2.No significant stenosis of the bilateral internal carotid arteries. 3.CT perfusion study demonstrates a tiny focus of prolonged Tmax greater than 6 seconds (3 mL) within the right occipital lobe. This could be artifactual. Tiny focus of ischemia cannot be excluded. Please correlate with MRI. Electronically Signed: Maurice Lam MD  3/20/2024 1:48 PM EDT  Workstation ID: [id number]    CT CEREBRAL PERFUSION WITH & WITHOUT CONTRAST    Result Date: 3/20/2024  1.No intracranial large vessel occlusion is identified. 2.No significant stenosis of the bilateral internal carotid arteries. 3.CT perfusion study demonstrates a tiny focus of prolonged Tmax greater than 6 seconds (3 mL) within the right occipital lobe. This could be artifactual. Tiny " focus of ischemia cannot be excluded. Please correlate with MRI. Electronically Signed: Maurice Lam MD  3/20/2024 1:48 PM EDT  Workstation ID: [id number]    XR Chest 1 View    Result Date: 3/20/2024  Impression: 1.An acute pulmonary process is not apparent. Electronically Signed: Roger Herring MD  3/20/2024 1:37 PM EDT  Workstation ID: [id number]    CT Head Without Contrast Stroke Protocol    Result Date: 3/20/2024  Impression: Chronic findings. No acute process. Electronically Signed: Earnestine Ramirez MD  3/20/2024 1:09 PM EDT  Workstation ID: [id number]          Results Reviewed:  I have personally reviewed current lab, radiology, and data and agree with results.      Assessment/Plan:  78-year-old male with PMH of HTN, prostate cancer, T2DM, prior history of stroke (2021), current tobacco abuse and EtOH abuse who presented with altered mental status, slurred speech, and unsteady gait. Last known well 2300 on 3/19/2024.  NIH 4 for dysarthria, minor aphasia, bilateral peripheral visual deficits, and a slight facial droop detected on the right side.  Emergent CT head without hemorrhage or acute abnormality identified.  CT angiogram does not demonstrate large vessel occlusion. Patient not a candidate for IV thrombolytics 2/2 last known well.  No identifiable LVO for neuro intervention. ICAD evidenced on CTA    Antiplatelet PTA: none  Anticoagulant PTA: none      AMS, dysarthria, general weakness  -Differential includes complex migraine vs AIS vs stroke recrudescence vs metabolic encephalopathy   -TIA/ischemic stroke without IV thrombolytic order set in place  -Emergent CT head without hemorrhage or acute abnormality identified.    -CT angiogram does not demonstrate large vessel occlusion  -MRI pending  -TTE pending  -Migraine cocktail of Benadryl, mag sulfate, and Compazine given in ED  -Start atorvastatin 80 mg nightly, FLP with a.m. labs, LDL goal less than 70  -Load Plavix 300 mg once and continue Plavix 75 mg starting  on 3/21/2024   -Start aspirin 81 mg daily indefinitely.   -N.p.o. until patient passes dysphagia screening, healthy cardiac diet once screening is passed  -Activity as tolerated  -PT/OT/SLP to evaluate  -Neurology stroke will continue to follow    2. HTN  -Allow for permissive hypertension currently to ensure adequate perfusion  -Management per internal medicine team    Disposition: Patient will be admitted to telemetry floor for continued stroke workup    Plan of care discussed with patient, patient's son, Dr. Mae, and Dr. Hart.  Please call with any questions or concerns    Jose Jimenez PA-C  March 20, 2024  13:02 EDT

## 2024-03-20 NOTE — ED NOTES
" Patrice Howard    Nursing Report ED to Floor:  Mental status: alert to self and  and where he is  Ambulatory status: walked to bathroom, however was unstable..we are now using a urinal vs going to the bathrooom  Oxygen Therapy:  ra  Cardiac Rhythm: nsr  Admitted from: home/er  Safety Concerns:  potential fall risk  Social Issues: family at bedside  ED Room #:  18    ED Nurse Phone Extension - [phone] or may call [phone].      HPI:   Chief Complaint   Patient presents with    Stroke       Past Medical History:  Past Medical History:   Diagnosis Date    Elevated PSA         Past Surgical History:  No past surgical history on file.     Admitting Doctor:   Yanick Curtis MD    Consulting Provider(s):  Consults       Date and Time Order Name Status Description    3/20/2024 12:52 PM Inpatient Neurology Consult Stroke               Admitting Diagnosis:   The primary encounter diagnosis was Altered mental status, unspecified altered mental status type. A diagnosis of Difficulty with speech was also pertinent to this visit.    Most Recent Vitals:   Vitals:    24 1234 24 1238   BP:  159/85   BP Location:  Right arm   Patient Position:  Sitting   Pulse:  68   Resp: 15 12   Temp:  98.5 °F (36.9 °C)   TempSrc:  Oral   SpO2: 98% 97%   Weight:  61.2 kg (135 lb)   Height:  170.2 cm (67\")       Active LDAs/IV Access:   Lines, Drains & Airways       Active LDAs       Name Placement date Placement time Site Days    Peripheral IV 24 1248 Anterior;Distal;Left;Upper Arm 24  1248  Arm  less than 1                    Labs (abnormal labs have a star):   Labs Reviewed   CBC WITH AUTO DIFFERENTIAL - Abnormal; Notable for the following components:       Result Value    RBC 3.85 (*)     .2 (*)     MCH 36.6 (*)     RDW 11.9 (*)     Lymphocyte % 15.1 (*)     Monocyte % 15.0 (*)     Eosinophil % 0.1 (*)     Monocytes, Absolute 1.22 (*)     All other components within normal limits   URINALYSIS W/ CULTURE IF INDICATED " - Abnormal; Notable for the following components:    Specific Gravity, UA 1.075 (*)     Ketones, UA 15 mg/dL (1+) (*)     Blood, UA Small (1+) (*)     All other components within normal limits    Narrative:     In absence of clinical symptoms, the presence of pyuria, bacteria, and/or nitrites on the urinalysis result does not correlate with infection.   URINALYSIS, MICROSCOPIC ONLY - Abnormal; Notable for the following components:    RBC, UA 3-5 (*)     WBC, UA 3-5 (*)     All other components within normal limits   POCT CHEM 8 - Abnormal; Notable for the following components:    Glucose 144 (*)     Sodium 133 (*)     Total CO2 22 (*)     All other components within normal limits   SINGLE HSTROPONIN T - Normal    Narrative:     High Sensitive Troponin T Reference Range:  <14.0 ng/L- Negative Female for AMI  <22.0 ng/L- Negative Male for AMI  >=14 - Abnormal Female indicating possible myocardial injury.  >=22 - Abnormal Male indicating possible myocardial injury.   Clinicians would have to utilize clinical acumen, EKG, Troponin, and serial changes to determine if it is an Acute Myocardial Infarction or myocardial injury due to an underlying chronic condition.        APTT - Normal    Narrative:     PTT = The equivalent PTT values for the therapeutic range of heparin levels at 0.3 to 0.5 U/ml are 60 to 70 seconds.   AST - Normal   ALT - Normal   COVID PRE-OP / PRE-PROCEDURE SCREENING ORDER (NO ISOLATION)    Narrative:     The following orders were created for panel order COVID PRE-OP / PRE-PROCEDURE SCREENING ORDER (NO ISOLATION) - Swab, Nasopharynx.  Procedure                               Abnormality         Status                     ---------                               -----------         ------                     Respiratory Panel PCR w/...[[id number]]                      In process                   Please view results for these tests on the individual orders.   RESPIRATORY PANEL PCR W/ COVID-19 (SARS-COV-2),  NP SWAB IN UTM/VTP, 2 HR TAT   RAINBOW DRAW    Narrative:     The following orders were created for panel order [place] Draw.  Procedure                               Abnormality         Status                     ---------                               -----------         ------                     Green Top (Gel)[[id number]]                                  Final result               Lavender Top[[id number]]                                     Final result               Gold Top - SST[[id number]]                                   Final result               Sanders Top[[id number]]                                         In process                 Light Blue Top[[id number]]                                   Final result                 Please view results for these tests on the individual orders.   POCT CHEM 8   POCT PROTIME - INR   CBC AND DIFFERENTIAL    Narrative:     The following orders were created for panel order CBC & Differential.  Procedure                               Abnormality         Status                     ---------                               -----------         ------                     CBC Auto Differential[[id number]]        Abnormal            Final result                 Please view results for these tests on the individual orders.   GREEN TOP   LAVENDER TOP   GOLD TOP - SST   LIGHT BLUE TOP   GRAY TOP       Meds Given in ED:   Medications   sodium chloride 0.9 % flush 10 mL (has no administration in time range)   magnesium sulfate 2g/50 mL (PREMIX) infusion (2 g Intravenous New Bag 3/20/24 1347)   prochlorperazine (COMPAZINE) injection 10 mg (10 mg Intravenous Given 3/20/24 1351)   diphenhydrAMINE (BENADRYL) injection 12.5 mg (12.5 mg Intravenous Given 3/20/24 1351)   clopidogrel (PLAVIX) tablet 300 mg (300 mg Oral Given 3/20/24 1349)     And   clopidogrel (PLAVIX) tablet 75 mg (has no administration in time range)   aspirin chewable tablet 81 mg (81 mg Oral Given 3/20/24 1351)     Or   aspirin  suppository 300 mg ( Rectal Not Given:  See Alt 3/20/24 1351)   iopamidol (ISOVUE-370) 76 % injection 100 mL (115 mL Intravenous Given 3/20/24 1315)           Last NIH score:  Interval: baseline  1a. Level of Consciousness: 0-->Alert, keenly responsive  1b. LOC Questions: 0-->Answers both questions correctly  1c. LOC Commands: 0-->Performs both tasks correctly  2. Best Gaze: 0-->Normal  3. Visual: 1-->Partial hemianopia  4. Facial Palsy: 1-->Minor paralysis (flattened nasolabial fold, asymmetry on smiling)  5a. Motor Arm, Left: 0-->No drift, limb holds 90 (or 45) degrees for full 10 secs  5b. Motor Arm, Right: 0-->No drift, limb holds 90 (or 45) degrees for full 10 secs  6a. Motor Leg, Left: 0-->No drift, leg holds 30 degree position for full 5 secs  6b. Motor Leg, Right: 0-->No drift, leg holds 30 degree position for full 5 secs  7. Limb Ataxia: 0-->Absent  8. Sensory: 0-->Normal, no sensory loss  9. Best Language: 1-->Mild-to-moderate aphasia, some obvious loss of fluency or facility of comprehension, without significant limitation on ideas expressed or form of expression. Reduction of speech and/or comprehension, however, makes conversation. . . (see row details)  10. Dysarthria: 1-->Mild-to-moderate dysarthria, patient slurs at least some words and, at worst, can be understood with some difficulty  11. Extinction and Inattention (formerly Neglect): 0-->No abnormality    Total (NIH Stroke Scale): 4     Dysphagia screening results:        Clint Coma Scale:  No data recorded     CIWA:        Restraint Type:            Isolation Status:  No active isolations

## 2024-03-20 NOTE — ED PROVIDER NOTES
Subjective   History of Present Illness  78-year-old male who presents for evaluation of mental status.  The son spoke with the patient yesterday evening reportedly was normal at that given time.  Upon having a conversation with the patient at 9 AM this morning he seemed altered, and had difficulty maintaining the conversation.  The patient has a previous history of stroke per the son.  He does not take anticoagulation.  The son is concerned for stroke.  The patient in general appears fatigued but does not have any focal weakness.  He is oriented person but not oriented place or time.  He is able to follow commands and does not express any focal weakness to the face, arms, or legs.  No complaint of chest pain or abdominal pain.  He does report some cough and upper respiratory symptoms.  He denies urinary symptoms include no dysuria, frequency, urgency.  No diarrhea or blood in the stool.  No other acute complaints.      Review of Systems   Constitutional:  Positive for activity change and fatigue. Negative for chills and fever.   HENT:  Negative for congestion, ear pain, postnasal drip, sinus pressure and sore throat.    Eyes:  Negative for pain, redness and visual disturbance.   Respiratory:  Positive for cough. Negative for chest tightness and shortness of breath.    Cardiovascular:  Negative for chest pain, palpitations and leg swelling.   Gastrointestinal:  Negative for abdominal pain, anal bleeding, blood in stool, diarrhea, nausea and vomiting.   Endocrine: Negative for polydipsia and polyuria.   Genitourinary:  Negative for difficulty urinating, dysuria, frequency and urgency.   Musculoskeletal:  Negative for arthralgias, back pain and neck pain.   Skin:  Negative for pallor and rash.   Allergic/Immunologic: Negative for environmental allergies and immunocompromised state.   Neurological:  Negative for dizziness, weakness and headaches.   Hematological:  Negative for adenopathy.   Psychiatric/Behavioral:   Positive for confusion and decreased concentration. Negative for self-injury and suicidal ideas. The patient is not nervous/anxious.    All other systems reviewed and are negative.      Past Medical History:   Diagnosis Date    Diabetes     Elevated PSA     Prostate cancer        No Known Allergies    History reviewed. No pertinent surgical history.    History reviewed. No pertinent family history.    Social History     Socioeconomic History    Marital status: Single   Tobacco Use    Smoking status: Every Day     Current packs/day: 1.00     Average packs/day: 1 pack/day for 2.0 years (2.0 ttl pk-yrs)     Types: Cigarettes    Smokeless tobacco: Never   Vaping Use    Vaping status: Never Used   Substance and Sexual Activity    Alcohol use: Yes     Alcohol/week: 4.0 standard drinks of alcohol     Types: 2 Cans of beer, 2 Shots of liquor per week    Drug use: Never    Sexual activity: Defer           Objective   Physical Exam  Vitals and nursing note reviewed.   Constitutional:       General: He is not in acute distress.     Appearance: Normal appearance. He is well-developed. He is not toxic-appearing or diaphoretic.   HENT:      Head: Normocephalic and atraumatic.      Right Ear: External ear normal.      Left Ear: External ear normal.      Nose: Nose normal.   Eyes:      General: Lids are normal.      Pupils: Pupils are equal, round, and reactive to light.   Neck:      Trachea: No tracheal deviation.   Cardiovascular:      Rate and Rhythm: Normal rate and regular rhythm.      Pulses: No decreased pulses.      Heart sounds: Normal heart sounds. No murmur heard.     No friction rub. No gallop.   Pulmonary:      Effort: Pulmonary effort is normal. No respiratory distress.      Breath sounds: Normal breath sounds. No decreased breath sounds, wheezing, rhonchi or rales.   Abdominal:      General: Bowel sounds are normal.      Palpations: Abdomen is soft.      Tenderness: There is no abdominal tenderness. There is no  guarding or rebound.   Musculoskeletal:         General: No deformity. Normal range of motion.      Cervical back: Normal range of motion and neck supple.   Lymphadenopathy:      Cervical: No cervical adenopathy.   Skin:     General: Skin is warm and dry.      Findings: No rash.   Neurological:      Mental Status: He is alert and oriented to person, place, and time.      GCS: GCS eye subscore is 4. GCS verbal subscore is 5. GCS motor subscore is 6.      Cranial Nerves: No cranial nerve deficit.      Sensory: No sensory deficit.      Comments: No focal neurologic deficit.    GCS 15.    NIH stroke scale 0.   Psychiatric:         Speech: Speech normal.         Behavior: Behavior normal.         Thought Content: Thought content normal.         Judgment: Judgment normal.         Procedures           ED Course  ED Course as of 03/22/24 1700   Wed Mar 20, 2024   1313 Is not a tenecteplase candidate due to last known well time yesterday evening.   Almost 24 hours prior to arrival.  I will pursue CT imaging given the patient's previous history of stroke. [NS]   1405 The patient presents due to altered mental status, confusion, and change in speech pattern per the son's report.  He did not notice the symptoms when he spoke with the patient yesterday evening.  However they were noticeable at 9 AM this morning when the son spoke with the patient.  He does not seem to be confused and somewhat distracted.  He is oriented to person.  He is awake with no somnolence or lethargy.  He does report a mild cough.  He denies urinary symptoms or change in bowel function.  Chest x-ray is clear.  Normal white count, afebrile, no other infectious signs or symptoms.  CT angiogram of the head and neck show significant stenosis diffusely.  Stroke service is following.  They recommend admission for further evaluation.  He is stable and nontoxic. [NS]      ED Course User Index  [NS] Kayden Hart MD                          Total (NIH  Stroke Scale): 16                  Medical Decision Making  Differential diagnosis includes viral illness, pneumonia, renal insufficiency, dehydration, electrolyte abnormality, urinary tract infection, intracranial hemorrhage, other unspecified etiology.    Urine shows signs of dehydration with an elevated specific gravity but does not show infection.  Viral respiratory panel is negative.  Normal white count, baseline H&H, normal kidney function, no significant electrolyte abnormalities.    CT scan of the head without contrast shows chronic findings without any acute process.  The stroke service feels that the patient has diffuse stenosis on CT imaging brain without an acute ischemic lesion.  He is not a tenecteplase or acute interventional candidate.    I discussed the patient with the hospitalist, who will consult on the patient to determine status of admission.        Problems Addressed:  Altered mental status, unspecified altered mental status type: complicated acute illness or injury with systemic symptoms that poses a threat to life or bodily functions  Difficulty with speech: complicated acute illness or injury with systemic symptoms that poses a threat to life or bodily functions    Amount and/or Complexity of Data Reviewed  Independent Historian: friend     Details: Son provides additional information  External Data Reviewed: labs, radiology and ECG.  Labs: ordered. Decision-making details documented in ED Course.  Radiology: ordered and independent interpretation performed. Decision-making details documented in ED Course.  ECG/medicine tests: ordered and independent interpretation performed. Decision-making details documented in ED Course.     Details: EKG independently interpreted myself shows sinus rhythm without acute ischemic changes.      Risk  Prescription drug management.  Decision regarding hospitalization.        Final diagnoses:   Altered mental status, unspecified altered mental status type    Difficulty with speech       ED Disposition  ED Disposition       ED Disposition   Decision to Admit    Condition   --    Comment   Level of Care: Telemetry [5]   Diagnosis: Aphasia [784.3.ICD-9-CM]   Admitting Physician: ANT GALLARDO [[id number]]   Attending Physician: ANT GALLARDO [[id number]]                 No follow-up provider specified.       Medication List      No changes were made to your prescriptions during this visit.            Kayden Hart MD  03/22/24 1702

## 2024-03-21 ENCOUNTER — APPOINTMENT (OUTPATIENT)
Dept: MRI IMAGING | Facility: HOSPITAL | Age: 79
End: 2024-03-21
Payer: MEDICARE

## 2024-03-21 ENCOUNTER — APPOINTMENT (OUTPATIENT)
Dept: NEUROLOGY | Facility: HOSPITAL | Age: 79
End: 2024-03-21
Payer: MEDICARE

## 2024-03-21 ENCOUNTER — APPOINTMENT (OUTPATIENT)
Dept: CARDIOLOGY | Facility: HOSPITAL | Age: 79
End: 2024-03-21
Payer: MEDICARE

## 2024-03-21 PROBLEM — G93.41 ACUTE METABOLIC ENCEPHALOPATHY: Status: ACTIVE | Noted: 2024-03-21

## 2024-03-21 PROBLEM — R50.9 FEVER: Status: ACTIVE | Noted: 2024-03-21

## 2024-03-21 LAB
ALBUMIN SERPL-MCNC: 3.9 G/DL (ref 3.5–5.2)
ALBUMIN/GLOB SERPL: 2 G/DL
ALP SERPL-CCNC: 64 U/L (ref 39–117)
ALT SERPL W P-5'-P-CCNC: 17 U/L (ref 1–41)
AMPHET+METHAMPHET UR QL: NEGATIVE
AMPHETAMINES UR QL: NEGATIVE
ANION GAP SERPL CALCULATED.3IONS-SCNC: 12 MMOL/L (ref 5–15)
AST SERPL-CCNC: 19 U/L (ref 1–40)
BARBITURATES UR QL SCN: NEGATIVE
BASOPHILS # BLD AUTO: 0.02 10*3/MM3 (ref 0–0.2)
BASOPHILS NFR BLD AUTO: 0.3 % (ref 0–1.5)
BENZODIAZ UR QL SCN: NEGATIVE
BILIRUB SERPL-MCNC: 1 MG/DL (ref 0–1.2)
BUN SERPL-MCNC: 11 MG/DL (ref 8–23)
BUN/CREAT SERPL: 13.6 (ref 7–25)
BUPRENORPHINE SERPL-MCNC: NEGATIVE NG/ML
CALCIUM SPEC-SCNC: 8.3 MG/DL (ref 8.6–10.5)
CANNABINOIDS SERPL QL: NEGATIVE
CHLORIDE SERPL-SCNC: 98 MMOL/L (ref 98–107)
CHOLEST SERPL-MCNC: 131 MG/DL (ref 0–200)
CO2 SERPL-SCNC: 22 MMOL/L (ref 22–29)
COCAINE UR QL: NEGATIVE
CREAT SERPL-MCNC: 0.81 MG/DL (ref 0.76–1.27)
DEPRECATED RDW RBC AUTO: 46.6 FL (ref 37–54)
EGFRCR SERPLBLD CKD-EPI 2021: 90.2 ML/MIN/1.73
EOSINOPHIL # BLD AUTO: 0.01 10*3/MM3 (ref 0–0.4)
EOSINOPHIL NFR BLD AUTO: 0.1 % (ref 0.3–6.2)
ERYTHROCYTE [DISTWIDTH] IN BLOOD BY AUTOMATED COUNT: 11.8 % (ref 12.3–15.4)
FENTANYL UR-MCNC: NEGATIVE NG/ML
FOLATE SERPL-MCNC: 9.98 NG/ML (ref 4.78–24.2)
GLOBULIN UR ELPH-MCNC: 2 GM/DL
GLUCOSE BLDC GLUCOMTR-MCNC: 106 MG/DL (ref 70–130)
GLUCOSE BLDC GLUCOMTR-MCNC: 108 MG/DL (ref 70–130)
GLUCOSE BLDC GLUCOMTR-MCNC: 110 MG/DL (ref 70–130)
GLUCOSE BLDC GLUCOMTR-MCNC: 119 MG/DL (ref 70–130)
GLUCOSE BLDC GLUCOMTR-MCNC: 139 MG/DL (ref 70–130)
GLUCOSE BLDC GLUCOMTR-MCNC: 145 MG/DL (ref 70–130)
GLUCOSE SERPL-MCNC: 96 MG/DL (ref 65–99)
HBA1C MFR BLD: 5.3 % (ref 4.8–5.6)
HCT VFR BLD AUTO: 36.1 % (ref 37.5–51)
HDLC SERPL-MCNC: 63 MG/DL (ref 40–60)
HGB BLD-MCNC: 12.5 G/DL (ref 13–17.7)
IMM GRANULOCYTES # BLD AUTO: 0.02 10*3/MM3 (ref 0–0.05)
IMM GRANULOCYTES NFR BLD AUTO: 0.3 % (ref 0–0.5)
LDLC SERPL CALC-MCNC: 50 MG/DL (ref 0–100)
LDLC/HDLC SERPL: 0.78 {RATIO}
LYMPHOCYTES # BLD AUTO: 1.68 10*3/MM3 (ref 0.7–3.1)
LYMPHOCYTES NFR BLD AUTO: 23.7 % (ref 19.6–45.3)
MCH RBC QN AUTO: 37.5 PG (ref 26.6–33)
MCHC RBC AUTO-ENTMCNC: 34.6 G/DL (ref 31.5–35.7)
MCV RBC AUTO: 108.4 FL (ref 79–97)
METHADONE UR QL SCN: NEGATIVE
MONOCYTES # BLD AUTO: 1.34 10*3/MM3 (ref 0.1–0.9)
MONOCYTES NFR BLD AUTO: 18.9 % (ref 5–12)
NEUTROPHILS NFR BLD AUTO: 4.02 10*3/MM3 (ref 1.7–7)
NEUTROPHILS NFR BLD AUTO: 56.7 % (ref 42.7–76)
NRBC BLD AUTO-RTO: 0 /100 WBC (ref 0–0.2)
OPIATES UR QL: NEGATIVE
OXYCODONE UR QL SCN: NEGATIVE
PCP UR QL SCN: NEGATIVE
PLATELET # BLD AUTO: 118 10*3/MM3 (ref 140–450)
PMV BLD AUTO: 10 FL (ref 6–12)
POTASSIUM SERPL-SCNC: 3.7 MMOL/L (ref 3.5–5.2)
PROT SERPL-MCNC: 5.9 G/DL (ref 6–8.5)
QT INTERVAL: 424 MS
QTC INTERVAL: 426 MS
RBC # BLD AUTO: 3.33 10*6/MM3 (ref 4.14–5.8)
SODIUM SERPL-SCNC: 132 MMOL/L (ref 136–145)
TRICYCLICS UR QL SCN: NEGATIVE
TRIGL SERPL-MCNC: 95 MG/DL (ref 0–150)
TSH SERPL DL<=0.05 MIU/L-ACNC: 0.73 UIU/ML (ref 0.27–4.2)
VIT B12 BLD-MCNC: 279 PG/ML (ref 211–946)
VLDLC SERPL-MCNC: 18 MG/DL (ref 5–40)
WBC NRBC COR # BLD AUTO: 7.09 10*3/MM3 (ref 3.4–10.8)

## 2024-03-21 PROCEDURE — 83036 HEMOGLOBIN GLYCOSYLATED A1C: CPT

## 2024-03-21 PROCEDURE — 97116 GAIT TRAINING THERAPY: CPT

## 2024-03-21 PROCEDURE — 80053 COMPREHEN METABOLIC PANEL: CPT | Performed by: HOSPITALIST

## 2024-03-21 PROCEDURE — 80061 LIPID PANEL: CPT

## 2024-03-21 PROCEDURE — 97162 PT EVAL MOD COMPLEX 30 MIN: CPT

## 2024-03-21 PROCEDURE — 97165 OT EVAL LOW COMPLEX 30 MIN: CPT

## 2024-03-21 PROCEDURE — 25010000002 MIDAZOLAM PER 1 MG: Performed by: HOSPITALIST

## 2024-03-21 PROCEDURE — 25010000002 THIAMINE HCL 200 MG/2ML SOLUTION: Performed by: HOSPITALIST

## 2024-03-21 PROCEDURE — 25810000003 LACTATED RINGERS PER 1000 ML: Performed by: HOSPITALIST

## 2024-03-21 PROCEDURE — 97535 SELF CARE MNGMENT TRAINING: CPT

## 2024-03-21 PROCEDURE — 93010 ELECTROCARDIOGRAM REPORT: CPT | Performed by: INTERNAL MEDICINE

## 2024-03-21 PROCEDURE — 70553 MRI BRAIN STEM W/O & W/DYE: CPT

## 2024-03-21 PROCEDURE — 99233 SBSQ HOSP IP/OBS HIGH 50: CPT | Performed by: PSYCHIATRY & NEUROLOGY

## 2024-03-21 PROCEDURE — 0 GADOBENATE DIMEGLUMINE 529 MG/ML SOLUTION: Performed by: HOSPITALIST

## 2024-03-21 PROCEDURE — 82948 REAGENT STRIP/BLOOD GLUCOSE: CPT

## 2024-03-21 PROCEDURE — 25010000002 CYANOCOBALAMIN PER 1000 MCG: Performed by: INTERNAL MEDICINE

## 2024-03-21 PROCEDURE — 85025 COMPLETE CBC W/AUTO DIFF WBC: CPT | Performed by: HOSPITALIST

## 2024-03-21 PROCEDURE — 95819 EEG AWAKE AND ASLEEP: CPT

## 2024-03-21 PROCEDURE — 25010000002 ZIPRASIDONE MESYLATE PER 10 MG: Performed by: INTERNAL MEDICINE

## 2024-03-21 PROCEDURE — A9577 INJ MULTIHANCE: HCPCS | Performed by: HOSPITALIST

## 2024-03-21 PROCEDURE — 84443 ASSAY THYROID STIM HORMONE: CPT | Performed by: PSYCHIATRY & NEUROLOGY

## 2024-03-21 PROCEDURE — 99232 SBSQ HOSP IP/OBS MODERATE 35: CPT | Performed by: INTERNAL MEDICINE

## 2024-03-21 PROCEDURE — 93005 ELECTROCARDIOGRAM TRACING: CPT | Performed by: HOSPITALIST

## 2024-03-21 PROCEDURE — 95819 EEG AWAKE AND ASLEEP: CPT | Performed by: PSYCHIATRY & NEUROLOGY

## 2024-03-21 PROCEDURE — 92610 EVALUATE SWALLOWING FUNCTION: CPT

## 2024-03-21 PROCEDURE — 25010000002 THIAMINE PER 100 MG: Performed by: INTERNAL MEDICINE

## 2024-03-21 RX ORDER — LOSARTAN POTASSIUM 25 MG/1
25 TABLET ORAL DAILY
Status: DISCONTINUED | OUTPATIENT
Start: 2024-03-21 | End: 2024-03-24

## 2024-03-21 RX ORDER — PANTOPRAZOLE SODIUM 40 MG/10ML
40 INJECTION, POWDER, LYOPHILIZED, FOR SOLUTION INTRAVENOUS EVERY 12 HOURS SCHEDULED
Status: DISCONTINUED | OUTPATIENT
Start: 2024-03-21 | End: 2024-03-26

## 2024-03-21 RX ORDER — CEFDINIR 300 MG/1
300 CAPSULE ORAL EVERY 12 HOURS SCHEDULED
Status: DISCONTINUED | OUTPATIENT
Start: 2024-03-21 | End: 2024-03-24

## 2024-03-21 RX ORDER — ZIPRASIDONE MESYLATE 20 MG/ML
10 INJECTION, POWDER, LYOPHILIZED, FOR SOLUTION INTRAMUSCULAR EVERY 6 HOURS PRN
Status: DISCONTINUED | OUTPATIENT
Start: 2024-03-21 | End: 2024-03-27 | Stop reason: HOSPADM

## 2024-03-21 RX ORDER — FLUOXETINE HYDROCHLORIDE 20 MG/1
20 CAPSULE ORAL DAILY
Status: DISCONTINUED | OUTPATIENT
Start: 2024-03-21 | End: 2024-03-27 | Stop reason: HOSPADM

## 2024-03-21 RX ORDER — CYANOCOBALAMIN 1000 UG/ML
1000 INJECTION, SOLUTION INTRAMUSCULAR; SUBCUTANEOUS DAILY
Status: DISCONTINUED | OUTPATIENT
Start: 2024-03-21 | End: 2024-03-23

## 2024-03-21 RX ORDER — NICOTINE 21 MG/24HR
1 PATCH, TRANSDERMAL 24 HOURS TRANSDERMAL
Status: DISCONTINUED | OUTPATIENT
Start: 2024-03-21 | End: 2024-03-27 | Stop reason: HOSPADM

## 2024-03-21 RX ORDER — ACETAMINOPHEN 650 MG/1
650 SUPPOSITORY RECTAL EVERY 6 HOURS PRN
Status: DISCONTINUED | OUTPATIENT
Start: 2024-03-21 | End: 2024-03-27 | Stop reason: HOSPADM

## 2024-03-21 RX ORDER — ACETAMINOPHEN 325 MG/1
650 TABLET ORAL EVERY 6 HOURS PRN
Status: DISCONTINUED | OUTPATIENT
Start: 2024-03-21 | End: 2024-03-27 | Stop reason: HOSPADM

## 2024-03-21 RX ORDER — ACETAMINOPHEN 325 MG/1
650 TABLET ORAL EVERY 6 HOURS PRN
Status: DISCONTINUED | OUTPATIENT
Start: 2024-03-21 | End: 2024-03-21

## 2024-03-21 RX ADMIN — PANTOPRAZOLE SODIUM 40 MG: 40 INJECTION, POWDER, FOR SOLUTION INTRAVENOUS at 20:32

## 2024-03-21 RX ADMIN — Medication 10 ML: at 10:15

## 2024-03-21 RX ADMIN — MIDAZOLAM HYDROCHLORIDE 4 MG: 1 INJECTION, SOLUTION INTRAMUSCULAR; INTRAVENOUS at 05:46

## 2024-03-21 RX ADMIN — CEFDINIR 300 MG: 300 CAPSULE ORAL at 20:32

## 2024-03-21 RX ADMIN — MIDAZOLAM HYDROCHLORIDE 2 MG: 1 INJECTION, SOLUTION INTRAMUSCULAR; INTRAVENOUS at 22:21

## 2024-03-21 RX ADMIN — ZIPRASIDONE MESYLATE 10 MG: 20 INJECTION, POWDER, LYOPHILIZED, FOR SOLUTION INTRAMUSCULAR at 23:00

## 2024-03-21 RX ADMIN — MIDAZOLAM HYDROCHLORIDE 2 MG: 1 INJECTION, SOLUTION INTRAMUSCULAR; INTRAVENOUS at 03:46

## 2024-03-21 RX ADMIN — Medication 10 ML: at 20:33

## 2024-03-21 RX ADMIN — LORAZEPAM 1 MG: 1 TABLET ORAL at 18:59

## 2024-03-21 RX ADMIN — SODIUM CHLORIDE, POTASSIUM CHLORIDE, SODIUM LACTATE AND CALCIUM CHLORIDE 100 ML/HR: 600; 310; 30; 20 INJECTION, SOLUTION INTRAVENOUS at 20:39

## 2024-03-21 RX ADMIN — NICOTINE 1 PATCH: 14 PATCH, EXTENDED RELEASE TRANSDERMAL at 17:10

## 2024-03-21 RX ADMIN — FLUOXETINE 20 MG: 20 CAPSULE ORAL at 15:30

## 2024-03-21 RX ADMIN — THIAMINE HYDROCHLORIDE 500 MG: 100 INJECTION, SOLUTION INTRAMUSCULAR; INTRAVENOUS at 13:21

## 2024-03-21 RX ADMIN — SODIUM CHLORIDE, POTASSIUM CHLORIDE, SODIUM LACTATE AND CALCIUM CHLORIDE 100 ML/HR: 600; 310; 30; 20 INJECTION, SOLUTION INTRAVENOUS at 10:22

## 2024-03-21 RX ADMIN — LOSARTAN POTASSIUM 25 MG: 25 TABLET, FILM COATED ORAL at 15:30

## 2024-03-21 RX ADMIN — LORAZEPAM 1 MG: 1 TABLET ORAL at 20:33

## 2024-03-21 RX ADMIN — ACETAMINOPHEN 650 MG: 325 TABLET ORAL at 18:59

## 2024-03-21 RX ADMIN — ASPIRIN 81 MG: 81 TABLET, CHEWABLE ORAL at 09:55

## 2024-03-21 RX ADMIN — CYANOCOBALAMIN 1000 MCG: 1000 INJECTION, SOLUTION INTRAMUSCULAR; SUBCUTANEOUS at 15:30

## 2024-03-21 RX ADMIN — ACETAMINOPHEN 650 MG: 650 SUPPOSITORY RECTAL at 01:05

## 2024-03-21 RX ADMIN — GADOBENATE DIMEGLUMINE 15 ML: 529 INJECTION, SOLUTION INTRAVENOUS at 02:18

## 2024-03-21 RX ADMIN — FOLIC ACID 1 MG: 5 INJECTION, SOLUTION INTRAMUSCULAR; INTRAVENOUS; SUBCUTANEOUS at 10:14

## 2024-03-21 RX ADMIN — LORAZEPAM 1 MG: 1 TABLET ORAL at 13:58

## 2024-03-21 RX ADMIN — CLOPIDOGREL BISULFATE 75 MG: 75 TABLET ORAL at 09:55

## 2024-03-21 RX ADMIN — CEFDINIR 300 MG: 300 CAPSULE ORAL at 13:21

## 2024-03-21 RX ADMIN — PANTOPRAZOLE SODIUM 40 MG: 40 INJECTION, POWDER, FOR SOLUTION INTRAVENOUS at 13:22

## 2024-03-21 RX ADMIN — THIAMINE HYDROCHLORIDE 200 MG: 100 INJECTION, SOLUTION INTRAMUSCULAR; INTRAVENOUS at 05:14

## 2024-03-21 RX ADMIN — ACETAMINOPHEN 650 MG: 325 TABLET ORAL at 10:14

## 2024-03-21 RX ADMIN — ATORVASTATIN CALCIUM 80 MG: 40 TABLET, FILM COATED ORAL at 20:32

## 2024-03-21 NOTE — CONSULTS
Diabetes Education    Patient Name:  Patrice Howard  YOB: 1945  MRN: [id number]  Admit Date:  3/20/2024        Reviewed chart for diabetes education consult per stroke protocol.  Noted history of type 2 diabetes.  Noted taking Metformin at home.  Noted A1c is 5.3%.  Attempted to speak to Mr. Howard at bedside this morning, and he was very drowsy. Spoke with his son who states that he is taking metformin daily.  He states that he sees family doctor, Dr. Song in [place], [place].  He sates that he has a glucometer, but he does not check his blood sugar often that he knows of.  Son states that he is sure that his meter is over 3 years old.  Recommended they request new prescription for meter at next follow up with family provider.  He asked if he had to have prescription.  Explained that all meters are available over the counter, but insurance would likely cover cost with prescription.  Blood glucose was checked while I was in the room.  It was 119.  Son states that Mr. Howard lives alone in [place].    Mr. Howard is not a candidate for stroke and diabetes follow up class due to a BMI less than 25; his BMI is 21.  Will continue to follow to speak with Mr. Howard when he is more awake. Thank you for this referral.       Electronically signed by:  Dolores Connor RN  03/21/24 11:29 EDT

## 2024-03-21 NOTE — CONSULTS
Diabetes Education    Patient Name:  Patrice Howard  YOB: 1945  MRN: [id number]  Admit Date:  3/20/2024        Attempted to speak with Mr. Howard this afternoon, and he was still drowsy.  Will continue to follow to talk with him about diabetes.  Thank you.       Electronically signed by:  Dolores Connor RN  03/21/24 15:49 EDT

## 2024-03-21 NOTE — PROGRESS NOTES
[facility] Medicine Services  PROGRESS NOTE    Patient Name: Patrice Howard  : 1945  MRN: [id number]    Date of Admission: 3/20/2024  Primary Care Physician: Delano Song DO    Subjective   Subjective     CC:  Follow-up for alcohol drawl    HPI:  Patient seen and examined this morning.  Comfortable in bed.  Sleepy as he just received diazepam.  Oriented x 3.  Family at bedside.      Objective   Objective     Vital Signs:   Temp:  [97.8 °F (36.6 °C)-100.2 °F (37.9 °C)] 98.6 °F (37 °C)  Heart Rate:  [51-92] 60  Resp:  [16-18] 16  BP: (130-152)/(55-93) 130/78     Physical Exam:  General: Chronically ill and frail looking, not in distress, conversant and cooperative  Head: Atraumatic and normocephalic  Eyes: No Icterus. No pallor  Ears:  Ears appear intact with no abnormalities noted  Throat: No oral lesions, no thrush  Neck: Supple, trachea midline  Lungs: Clear to auscultation bilaterally, equal air entry, no wheezing or crackles  Heart:  Normal S1 and S2, no murmur, no gallop, No JVD, no lower extremity swelling  Abdomen:  Soft, no tenderness, no organomegaly, normal bowel sounds, no organomegaly  Extremities: pulses equal bilaterally  Skin: No bleeding, bruising or rash, normal skin turgor and elasticity  Neurologic: Cranial nerves appear intact with no evidence of facial asymmetry, normal motor and sensory functions in all 4 extremities  Psych: Alert and oriented x 3, normal mood    Results Reviewed:  LAB RESULTS:      Lab 24  0700 24  1253 24  1249   WBC 7.09 8.14  --    HEMOGLOBIN 12.5* 14.1  --    HEMOGLOBIN, POC  --   --  14.6   HEMATOCRIT 36.1* 40.1  --    HEMATOCRIT POC  --   --  43   PLATELETS 118* 154  --    NEUTROS ABS 4.02 5.62  --    IMMATURE GRANS (ABS) 0.02 0.03  --    LYMPHS ABS 1.68 1.23  --    MONOS ABS 1.34* 1.22*  --    EOS ABS 0.01 0.01  --    .4* 104.2*  --    APTT  --  28.0  --          Lab 24  0700 24  1249    SODIUM 132*  --    POTASSIUM 3.7  --    CHLORIDE 98  --    CO2 22.0  --    ANION GAP 12.0  --    BUN 11  --    CREATININE 0.81 0.90   EGFR 90.2 87.4   GLUCOSE 96  --    CALCIUM 8.3*  --    HEMOGLOBIN A1C 5.30  --    TSH 0.733  --          Lab 03/21/24  0700 03/20/24  1253   TOTAL PROTEIN 5.9*  --    ALBUMIN 3.9  --    GLOBULIN 2.0  --    ALT (SGPT) 17 27   AST (SGOT) 19 27   BILIRUBIN 1.0  --    ALK PHOS 64  --          Lab 03/20/24  1253   HSTROP T 11         Lab 03/21/24  0700   CHOLESTEROL 131   LDL CHOL 50   HDL CHOL 63*   TRIGLYCERIDES 95         Lab 03/20/24  2225   FOLATE 9.98   VITAMIN B 12 279         Brief Urine Lab Results  (Last result in the past 365 days)        Color   Clarity   Blood   Leuk Est   Nitrite   Protein   CREAT   Urine HCG        03/20/24 1345 Yellow   Clear   Small (1+)   Negative   Negative   Negative                   Microbiology Results Abnormal       Procedure Component Value - Date/Time    COVID PRE-OP / PRE-PROCEDURE SCREENING ORDER (NO ISOLATION) - Swab, Nasopharynx [[id number]]  (Normal) Collected: 03/20/24 1346    Lab Status: Final result Specimen: Swab from Nasopharynx Updated: 03/20/24 1446    Narrative:      The following orders were created for panel order COVID PRE-OP / PRE-PROCEDURE SCREENING ORDER (NO ISOLATION) - Swab, Nasopharynx.  Procedure                               Abnormality         Status                     ---------                               -----------         ------                     Respiratory Panel PCR w/...[[id number]]  Normal              Final result                 Please view results for these tests on the individual orders.    Respiratory Panel PCR w/COVID-19(SARS-CoV-2) [facility]/BRIGID/EMILY/PAD/COR/[facility]OF[facility] In-House, NP Swab in UTM/VTM, 2 HR TAT - Swab, Nasopharynx [[id number]]  (Normal) Collected: 03/20/24 1346    Lab Status: Final result Specimen: Swab from Nasopharynx Updated: 03/20/24 1446     ADENOVIRUS, PCR Not Detected     Coronavirus 229E Not  Detected     Coronavirus HKU1 Not Detected     Coronavirus NL63 Not Detected     Coronavirus OC43 Not Detected     COVID19 Not Detected     Human Metapneumovirus Not Detected     Human Rhinovirus/Enterovirus Not Detected     Influenza A PCR Not Detected     Influenza B PCR Not Detected     Parainfluenza Virus 1 Not Detected     Parainfluenza Virus 2 Not Detected     Parainfluenza Virus 3 Not Detected     Parainfluenza Virus 4 Not Detected     RSV, PCR Not Detected     Bordetella pertussis pcr Not Detected     Bordetella parapertussis PCR Not Detected     Chlamydophila pneumoniae PCR Not Detected     Mycoplasma pneumo by PCR Not Detected    Narrative:      In the setting of a positive respiratory panel with a viral infection PLUS a negative procalcitonin without other underlying concern for bacterial infection, consider observing off antibiotics or discontinuation of antibiotics and continue supportive care. If the respiratory panel is positive for atypical bacterial infection (Bordetella pertussis, Chlamydophila pneumoniae, or Mycoplasma pneumoniae), consider antibiotic de-escalation to target atypical bacterial infection.            MRI Brain With & Without Contrast    Result Date: 3/21/2024  MRI BRAIN W WO CONTRAST Date of Exam: 3/21/2024 1:43 AM EDT Indication: Stroke, follow up.  Comparison: CT scan the head dated March 20, 2024 Technique:  Routine multiplanar/multisequence sequence images of the brain were obtained before and after the uneventful administration of 15 mL Multihance. Findings: There is mild diffuse atrophy. There are areas of increased T2 and FLAIR signal in the bilateral periventricular and subcortical white matter locations consistent with chronic microvascular ischemia. There is no mass, mass effect or midline shift. There are no abnormal extra-axial fluid collections or areas of acute hemorrhage. The major intracranial flow voids are preserved. The diffusion weighted sequences are normal.  Contrast-enhanced images are somewhat degraded by the motion artifact. The thin section MP rage images are severely degraded. Axial T1 postcontrast image fails to demonstrate pathologic contrast enhancement.     Impression: Impression: Atrophy and chronic microvascular ischemia. No acute intracranial process. Electronically Signed: Demetris Lozano MD  3/21/2024 4:42 AM EDT  Workstation ID: [id number]    CT Head Without Contrast    Result Date: 3/20/2024  CT HEAD WO CONTRAST Date of Exam: 3/20/2024 8:08 PM EDT Indication: Neurologic decline Neurologic decline. Comparison: CT performed on the same date Technique: Axial CT images were obtained of the head without contrast administration.  Automated exposure control and iterative construction methods were used. Findings: No intracranial hemorrhage. Gray-white matter differentiation is maintained without evidence of an acute infarction. Multiple foci of decreased attenuation are present within the subcortical, deep cerebral, and periventricular white matter consistent with chronic small vessel/microangiopathic ischemic changes. No extra-axial mass or collection. The ventricles and sulci are prominent commensurate with involutional changes. The posterior fossa appears grossly normal. Sellar and suprasellar structures are normal. Orbital and periorbital soft tissues are normal. The paranasal sinuses, ethmoid air cells, and mastoid air cells are aerated. The bony calvarium is intact.     Impression: Impression: No acute intracranial pathology. Electronically Signed: Cipriano Jones MD  3/20/2024 8:27 PM EDT  Workstation ID: [id number]    CT Angiogram Head w AI Analysis of LVO    Result Date: 3/20/2024  CT ANGIOGRAM HEAD W AI ANALYSIS OF LVO, CT CEREBRAL PERFUSION W WO CONTRAST, CT ANGIOGRAM NECK Date of Exam: 3/20/2024 1:02 PM EDT Indication: Neuro deficit, acute stroke suspected Neuro deficit, acute stroke suspected. Comparison: None available. Technique: CTA of the head was  performed after the uneventful intravenous administration of . Reconstructed coronal and sagittal images were also obtained. In addition, a 3-D volume rendered image was created for interpretation. Automated exposure control and iterative reconstruction methods were used. FINDINGS: Vascular Findings: Atherosclerotic plaque within the right carotid bifurcation and right carotid siphon. The right common carotid, internal carotid, middle cerebral, anterior cerebral, vertebral, and posterior cerebral arteries are patent without abrupt cut off or aneurysmal dilation. There is absence of the A1 segment of the right anterior cerebral artery. There is fetal origin of the right posterior cerebral artery. Atherosclerotic plaque within the left carotid bifurcation and left carotid siphon. The left common carotid, internal carotid, middle cerebral, anterior cerebral, vertebral, and posterior cerebral arteries are patent without abrupt cut off or aneurysmal dilation. Basilar artery appears patent and appears unremarkable. Non-vascular Findings: For description of nonvascular intracranial findings, please refer to the noncontrast head CT performed the same date. No acute abnormality is identified within the visualized soft tissue or bony structures of the neck. Cervical spondylosis is present. The visualized lung apices are clear. CT Perfusion: CBF (<30%) volume: 0 mL Tmax (>6.0s) volume: 3 mL Mismatch volume: 3 mL Mismatch ratio: Infinite     Impression: 1.No intracranial large vessel occlusion is identified. 2.No significant stenosis of the bilateral internal carotid arteries. 3.CT perfusion study demonstrates a tiny focus of prolonged Tmax greater than 6 seconds (3 mL) within the right occipital lobe. This could be artifactual. Tiny focus of ischemia cannot be excluded. Please correlate with MRI. Electronically Signed: Maurice Lam MD  3/20/2024 1:48 PM EDT  Workstation ID: [id number]    CT Angiogram Neck    Result Date:  3/20/2024  CT ANGIOGRAM HEAD W AI ANALYSIS OF LVO, CT CEREBRAL PERFUSION W WO CONTRAST, CT ANGIOGRAM NECK Date of Exam: 3/20/2024 1:02 PM EDT Indication: Neuro deficit, acute stroke suspected Neuro deficit, acute stroke suspected. Comparison: None available. Technique: CTA of the head was performed after the uneventful intravenous administration of . Reconstructed coronal and sagittal images were also obtained. In addition, a 3-D volume rendered image was created for interpretation. Automated exposure control and iterative reconstruction methods were used. FINDINGS: Vascular Findings: Atherosclerotic plaque within the right carotid bifurcation and right carotid siphon. The right common carotid, internal carotid, middle cerebral, anterior cerebral, vertebral, and posterior cerebral arteries are patent without abrupt cut off or aneurysmal dilation. There is absence of the A1 segment of the right anterior cerebral artery. There is fetal origin of the right posterior cerebral artery. Atherosclerotic plaque within the left carotid bifurcation and left carotid siphon. The left common carotid, internal carotid, middle cerebral, anterior cerebral, vertebral, and posterior cerebral arteries are patent without abrupt cut off or aneurysmal dilation. Basilar artery appears patent and appears unremarkable. Non-vascular Findings: For description of nonvascular intracranial findings, please refer to the noncontrast head CT performed the same date. No acute abnormality is identified within the visualized soft tissue or bony structures of the neck. Cervical spondylosis is present. The visualized lung apices are clear. CT Perfusion: CBF (<30%) volume: 0 mL Tmax (>6.0s) volume: 3 mL Mismatch volume: 3 mL Mismatch ratio: Infinite     Impression: 1.No intracranial large vessel occlusion is identified. 2.No significant stenosis of the bilateral internal carotid arteries. 3.CT perfusion study demonstrates a tiny focus of prolonged Tmax  greater than 6 seconds (3 mL) within the right occipital lobe. This could be artifactual. Tiny focus of ischemia cannot be excluded. Please correlate with MRI. Electronically Signed: Maurice Lam MD  3/20/2024 1:48 PM EDT  Workstation ID: [id number]    CT CEREBRAL PERFUSION WITH & WITHOUT CONTRAST    Result Date: 3/20/2024  CT ANGIOGRAM HEAD W AI ANALYSIS OF LVO, CT CEREBRAL PERFUSION W WO CONTRAST, CT ANGIOGRAM NECK Date of Exam: 3/20/2024 1:02 PM EDT Indication: Neuro deficit, acute stroke suspected Neuro deficit, acute stroke suspected. Comparison: None available. Technique: CTA of the head was performed after the uneventful intravenous administration of . Reconstructed coronal and sagittal images were also obtained. In addition, a 3-D volume rendered image was created for interpretation. Automated exposure control and iterative reconstruction methods were used. FINDINGS: Vascular Findings: Atherosclerotic plaque within the right carotid bifurcation and right carotid siphon. The right common carotid, internal carotid, middle cerebral, anterior cerebral, vertebral, and posterior cerebral arteries are patent without abrupt cut off or aneurysmal dilation. There is absence of the A1 segment of the right anterior cerebral artery. There is fetal origin of the right posterior cerebral artery. Atherosclerotic plaque within the left carotid bifurcation and left carotid siphon. The left common carotid, internal carotid, middle cerebral, anterior cerebral, vertebral, and posterior cerebral arteries are patent without abrupt cut off or aneurysmal dilation. Basilar artery appears patent and appears unremarkable. Non-vascular Findings: For description of nonvascular intracranial findings, please refer to the noncontrast head CT performed the same date. No acute abnormality is identified within the visualized soft tissue or bony structures of the neck. Cervical spondylosis is present. The visualized lung apices are clear. CT  Perfusion: CBF (<30%) volume: 0 mL Tmax (>6.0s) volume: 3 mL Mismatch volume: 3 mL Mismatch ratio: Infinite     Impression: 1.No intracranial large vessel occlusion is identified. 2.No significant stenosis of the bilateral internal carotid arteries. 3.CT perfusion study demonstrates a tiny focus of prolonged Tmax greater than 6 seconds (3 mL) within the right occipital lobe. This could be artifactual. Tiny focus of ischemia cannot be excluded. Please correlate with MRI. Electronically Signed: Maurice Lam MD  3/20/2024 1:48 PM EDT  Workstation ID: [id number]    XR Chest 1 View    Result Date: 3/20/2024  XR CHEST 1 VW Date of Exam: 3/20/2024 1:18 PM EDT Indication: Acute Stroke Protocol (onset < 12 hrs) Comparison: CT chest September 12, 2023 Findings: The heart not definitely enlarged. The lungs seem clear. There are no pleural effusions. The visualized osseous structures do not appear unusual.     Impression: Impression: 1.An acute pulmonary process is not apparent. Electronically Signed: Roger Herring MD  3/20/2024 1:37 PM EDT  Workstation ID: [id number]    CT Head Without Contrast Stroke Protocol    Result Date: 3/20/2024  CT HEAD WO CONTRAST STROKE PROTOCOL Date of Exam: 3/20/2024 12:59 PM EDT Indication: Neuro deficit, acute, stroke suspected Neuro deficit, acute stroke suspected. Comparison: None available. Technique: Axial CT images were obtained of the head without contrast administration.  Reconstructed coronal images were also obtained. Automated exposure control and iterative construction methods were used. Scan Time: 1256 hours Results discussed with stroke team at 1259 hours. Findings: There is mild atrophy. There is mild periventricular hypodensity compatible with chronic small vessel ischemic insult. There is no acute mass effect or edema. There is calcification of the right vertebral artery at the skull base. There is no acute mass effect or edema. There are no findings suspicious for acute CVA or  hemorrhage. There is moderate polypoid mucosal thickening of the inferior maxillary sinuses.     Impression: Impression: Chronic findings. No acute process. Electronically Signed: Earnestine Ramirez MD  3/20/2024 1:09 PM EDT  Workstation ID: [id number]         Current medications:  Scheduled Meds:aspirin, 81 mg, Oral, Daily   Or  aspirin, 300 mg, Rectal, Daily  atorvastatin, 80 mg, Oral, Nightly  cefdinir, 300 mg, Oral, Q12H  cyanocobalamin, 1,000 mcg, Intramuscular, Daily  folic acid 1 mg in sodium chloride 0.9 % 50 mL IVPB, 1 mg, Intravenous, Daily  insulin regular, 2-7 Units, Subcutaneous, Q6H  LORazepam, 2 mg, Oral, Q6H   Followed by  LORazepam, 1 mg, Oral, Q6H  losartan, 25 mg, Oral, Daily  pantoprazole, 40 mg, Intravenous, Q12H  sodium chloride, 10 mL, Intravenous, Q12H  sodium chloride, 10 mL, Intravenous, Q12H  thiamine (B-1) IV, 500 mg, Intravenous, Daily      Continuous Infusions:lactated ringers, 100 mL/hr, Last Rate: 100 mL/hr (03/21/24 1022)      PRN Meds:.  acetaminophen    acetaminophen    senna-docusate sodium **AND** polyethylene glycol **AND** bisacodyl **AND** bisacodyl    Calcium Replacement - Follow Nurse / BPA Driven Protocol    dextrose    dextrose    glucagon (human recombinant)    LORazepam **OR** midazolam **OR** LORazepam **OR** midazolam **OR** midazolam **OR** midazolam    Magnesium Standard Dose Replacement - Follow Nurse / BPA Driven Protocol    ondansetron ODT    Phosphorus Replacement - Follow Nurse / BPA Driven Protocol    Potassium Replacement - Follow Nurse / BPA Driven Protocol    prochlorperazine **OR** prochlorperazine **OR** prochlorperazine    sodium chloride    sodium chloride    sodium chloride    sodium chloride    ziprasidone    Assessment & Plan   Assessment & Plan     Active Hospital Problems    Diagnosis  POA    **Aphasia [R47.01]  Yes    Fever [R50.9]  Yes    Alcohol use [Z78.9]  Unknown    History of prostate cancer [Z85.46]  Not Applicable    HTN (hypertension) [I10]   Unknown      Resolved Hospital Problems   No resolved problems to display.        Brief Hospital Course to date:  Patrice Howard is a 78 y.o. male with history of type 2 diabetes, ongoing tobacco and alcohol use, history of prostate cancer status post resection, essential hypertension, who presented to the hospital with slurred speech and left-sided weakness    Acute metabolic encephalopathy, improving  Alcohol withdrawal  Drinks at least 3 double shots of bourbon every night  Lives alone and independent with ADLs but feeling depressed and admits to using alcohol to treat his depression  Continue CIWA protocol  Extensive discussion with the patient and his son at bedside regarding alcohol dependence and withdrawal  High-dose IV thiamine and start B12 supplement pending B12 level    Possible alcohol induced gastritis  Nausea and vomiting for a week with poor oral intake  IV Protonix twice daily    Possible UTI, POA  Has urinary symptoms with dysuria and urgency  Start p.o. cefdinir    TIA  Acute stroke ruled out  Presented with left-sided weakness and slurred speech.  Stroke imaging studies including CT head and MRI brain negative  Continue aspirin and statins  Neurology team following    Essential pretension  Restart losartan    Type 2 diabetes  A1c  SSI    Hx of prostate cancer  Status post resection  on Lupron     Depression  Start Prozac      Expected Discharge Location and Transportation: Home  Expected Discharge   Expected Discharge Date: 3/25/2024; Expected Discharge Time:      DVT prophylaxis:  Mechanical DVT prophylaxis orders are present.         AM-PAC 6 Clicks Score (PT): 14 (03/20/24 1700)    CODE STATUS:   Code Status and Medical Interventions:   Ordered at: 03/20/24 0539     Code Status (Patient has no pulse and is not breathing):    CPR (Attempt to Resuscitate)     Medical Interventions (Patient has pulse or is breathing):    Full Support     Copied text in this note has been reviewed and is accurate  as of 03/21/24.     Fabiano Albarran MD  03/21/24

## 2024-03-21 NOTE — SIGNIFICANT NOTE
Primary RN called at 1920 to notify that patient's unresponsive lethargic not following commands patient has received Ativan and Compazine at the ED during imaging study so patient able to complete the study and remain calm.  Patient was seen and examined in his room family at the bedside his son present during the encounter reported that patient since he received Ativan has been very lethargic and drowsy and sleepy, however patient's during the exam able to identify his son answer questions properly.  Patient is noted to be lethargic eyes closed open them with noxious stimuli reports that he is very sleepy, able to identify himself and people, follow simple commands.  ROS unable to obtain  Interval: baseline  1a. Level of Consciousness: 2-->Not alert, requires repeated stimulation to attend, or is obtunded and requires strong or painful stimulation to make movements (not stereotyped)  1b. LOC Questions: 0-->Answers both questions correctly  1c. LOC Commands: 0-->Performs both tasks correctly  2. Best Gaze: 0-->Normal  3. Visual: 0-->No visual loss  4. Facial Palsy: 0-->Normal symmetrical movements  5a. Motor Arm, Left: 3-->No effort against gravity, limb falls  5b. Motor Arm, Right: 0-->No drift, limb holds 90 (or 45) degrees for full 10 secs  6a. Motor Leg, Left: 2-->Some effort against gravity, leg falls to bed by 5 secs, but has some effort against gravity  6b. Motor Leg, Right: 2-->Some effort against gravity, leg falls to bed by 5 secs, but has some effort against gravity  7. Limb Ataxia: 0-->Absent  8. Sensory: 0-->Normal, no sensory loss  9. Best Language: 1-->Mild-to-moderate aphasia, some obvious loss of fluency or facility of comprehension, without significant limitation on ideas expressed or form of expression. Reduction of speech and/or comprehension, however, makes conversation. . . (see row details)  10. Dysarthria: 1-->Mild-to-moderate dysarthria, patient slurs at least some words and, at worst, can  be understood with some difficulty  11. Extinction and Inattention (formerly Neglect): 0-->No abnormality    Total (NIH Stroke Scale): 11   CT Head Without Contrast    Result Date: 3/20/2024  Impression: No acute intracranial pathology. Electronically Signed: Cipriano Jones MD  3/20/2024 8:27 PM EDT  Workstation ID: [id number]    CT Angiogram Head w AI Analysis of LVO    Result Date: 3/20/2024  1.No intracranial large vessel occlusion is identified. 2.No significant stenosis of the bilateral internal carotid arteries. 3.CT perfusion study demonstrates a tiny focus of prolonged Tmax greater than 6 seconds (3 mL) within the right occipital lobe. This could be artifactual. Tiny focus of ischemia cannot be excluded. Please correlate with MRI. Electronically Signed: Maurice Lam MD  3/20/2024 1:48 PM EDT  Workstation ID: [id number]    CT Angiogram Neck    Result Date: 3/20/2024  1.No intracranial large vessel occlusion is identified. 2.No significant stenosis of the bilateral internal carotid arteries. 3.CT perfusion study demonstrates a tiny focus of prolonged Tmax greater than 6 seconds (3 mL) within the right occipital lobe. This could be artifactual. Tiny focus of ischemia cannot be excluded. Please correlate with MRI. Electronically Signed: Maurice Lam MD  3/20/2024 1:48 PM EDT  Workstation ID: [id number]    CT CEREBRAL PERFUSION WITH & WITHOUT CONTRAST    Result Date: 3/20/2024  1.No intracranial large vessel occlusion is identified. 2.No significant stenosis of the bilateral internal carotid arteries. 3.CT perfusion study demonstrates a tiny focus of prolonged Tmax greater than 6 seconds (3 mL) within the right occipital lobe. This could be artifactual. Tiny focus of ischemia cannot be excluded. Please correlate with MRI. Electronically Signed: Maurice Lam MD  3/20/2024 1:48 PM EDT  Workstation ID: [id number]    XR Chest 1 View    Result Date: 3/20/2024  Impression: 1.An acute pulmonary process is not apparent.  Electronically Signed: Roger Herring MD  3/20/2024 1:37 PM EDT  Workstation ID: [id number]    CT Head Without Contrast Stroke Protocol    Result Date: 3/20/2024  Impression: Chronic findings. No acute process. Electronically Signed: Earnestine Ramirez MD  3/20/2024 1:09 PM EDT  Workstation ID: [id number]       Assessment and planning  AMS  Dysarthria  Mixed aphasia  Patient presents to our facility for evaluation of altered mental status, dysarthria and generalized weakness.  NIH upon admission for currently 11 states CT head was ordered and negative for acute abnormality, no hemorrhage comparing to earlier image.  MRI with and without ordered and pending to rule out any pathology  Echo ordered and pending  Ordered level of folate, thiamine, B12, B6, ethanol level  Continue TIA\ischemic stroke order set  Continue DAPT    Essential hypertension  -Allow gradual normalization of blood pressure  -Currently blood pressure 119/50 ordered 500 cc normal saline bolus over an hour    Alcohol use  -Ethanol level ordered and pending  -Thiamine level, B12, B6 ordered and pending  -Will recommend banana bag to prevent Warnicke encephalopathy  -Seizure precaution in place  -Management by medicine team    Tobacco use  -Counseling on cessation  -Nicotine replacement management by medicine team    Discussed plan of care with patient, family at the bedside, primary RN.  Thank you for letting us participate in patient care neurology stroke we will continue to follow-up.

## 2024-03-21 NOTE — PROGRESS NOTES
Neurology Note    Patient:  Patrice Howard    YOB: 1945    REFERRING PHYSICIAN:  Dr. Albarran    CHIEF COMPLAINT:    Headache, AMS    HISTORY OF PRESENT ILLNESS:   The patient is a 78 y.o. male developed a right-sided headache a/w N/V on 3/19, reported having chills, has been restless and agitated. Brought to ED with family reporting slurred speech, stumbling, question of left-sided weakness and confusion. In ED T98.5, /85, O2 sat 98%, WBC 8K, , CT head negative, CTP with question a small focus of restricted perfusion in the right occipital lobe, CTA H/N without LVO or stenosis, ECG with NSR, UA with 3-5 WBC. He developed a temp of 100.1. Started on Omnicef for a possible infection, IVF. He received thiamine for h/o alcohol consumption. Today he is doing better per daughter, still a bit confused, able to eat.    Past Medical History:  Past Medical History:   Diagnosis Date    Diabetes     Elevated PSA     Prostate cancer        Past Surgical History:  History reviewed. No pertinent surgical history.    Social History:   Social History     Socioeconomic History    Marital status: Single   Tobacco Use    Smoking status: Every Day     Current packs/day: 1.00     Average packs/day: 1 pack/day for 2.0 years (2.0 ttl pk-yrs)     Types: Cigarettes    Smokeless tobacco: Never   Vaping Use    Vaping status: Never Used   Substance and Sexual Activity    Alcohol use: Yes     Alcohol/week: 4.0 standard drinks of alcohol     Types: 2 Cans of beer, 2 Shots of liquor per week    Drug use: Never    Sexual activity: Defer        Family History:   History reviewed. No pertinent family history.    Medications Prior to Admission:    Prior to Admission medications    Medication Sig Start Date End Date Taking? Authorizing Provider   aspirin 81 MG chewable tablet Chew 1 tablet every day by oral route.   Yes Provider, MD Grazyna   Calcium Carbonate-Vit D-Min (Caltrate 600+D Plus Minerals) 600-800 MG-UNIT  tablet Take 1 tablet by mouth Daily. 2/22/24  Yes Vasiliy Vega MD   lisinopril (PRINIVIL,ZESTRIL) 20 MG tablet Take 1 tablet by mouth Daily.   Yes ProviderGrazyna MD   losartan (COZAAR) 25 MG tablet Take 1 tablet by mouth Daily. 8/15/22  Yes Grazyna Car MD   metFORMIN (GLUCOPHAGE) 1000 MG tablet Take 1 tablet by mouth.   Yes Provider, MD Grazyna   triamcinolone (KENALOG) 0.025 % cream APPLY TOPICALLY TO THE AFFECTED AREA 2 TO 3 TIMES DAILY 6/15/22  Yes Joceline, MD Grazyna   sildenafil (Viagra) 100 MG tablet Take 1 tablet by mouth As Needed for Erectile Dysfunction (1 hr prior prior to intercourse w empty stomach). 2/8/24   Vasiliy Vega MD       Allergies:  Patient has no known allergies.      Review of system  Review of Systems   Unable to perform ROS: Mental status change       Vitals:    03/21/24 0600   BP:    Pulse: 60   Resp:    Temp:    SpO2: 96%       Physical exam  Physical Exam  HENT:      Head: Normocephalic and atraumatic.   Eyes:      Pupils: Pupils are equal, round, and reactive to light.   Pulmonary:      Effort: Pulmonary effort is normal.   Musculoskeletal:      Cervical back: Normal range of motion and neck supple.   Neurological:      Mental Status: He is alert.      Motor: Motor function is intact.      Coordination: Finger-Nose-Finger Test normal.      Deep Tendon Reflexes: Babinski sign absent on the right side. Babinski sign absent on the left side.      Comments: Sitting up in bed eating lunch, speech fairly clear, oriented to hospital, spring, 2024, counts fingers better on the right side, moves limbs well but appears to neglect his left side somehat.   Psychiatric:         Attention and Perception: He is inattentive.         Mood and Affect: Affect is flat.         Speech: Speech is delayed.           Lab Results   Component Value Date    WBC 7.09 03/21/2024    HGB 12.5 (L) 03/21/2024    HCT 36.1 (L) 03/21/2024    .4 (H) 03/21/2024      (L) 03/21/2024     Lab Results   Component Value Date    GLUCOSE 96 03/21/2024    BUN 11 03/21/2024    CREATININE 0.81 03/21/2024    BCR 13.6 03/21/2024    CO2 22.0 03/21/2024    CALCIUM 8.3 (L) 03/21/2024    ALBUMIN 3.9 03/21/2024    AST 19 03/21/2024    ALT 17 03/21/2024     Respiratory Panel PCR w/COVID-19(SARS-CoV-2) [facility] In-House, NP Swab in UTM/VTM, 2 HR TAT - Swab, Nasopharynx Normal Final result     Vitamin B-12  211 - 946 pg/mL 279     Folate  4.78 - 24.20 ng/mL 9.98     Radiological Studies:   MRI Brain With & Without Contrast    Result Date: 3/21/2024  MRI BRAIN W WO CONTRAST Date of Exam: 3/21/2024 1:43 AM EDT Indication: Stroke, follow up.  Comparison: CT scan the head dated March 20, 2024 Technique:  Routine multiplanar/multisequence sequence images of the brain were obtained before and after the uneventful administration of 15 mL Multihance. Findings: There is mild diffuse atrophy. There are areas of increased T2 and FLAIR signal in the bilateral periventricular and subcortical white matter locations consistent with chronic microvascular ischemia. There is no mass, mass effect or midline shift. There are no abnormal extra-axial fluid collections or areas of acute hemorrhage. The major intracranial flow voids are preserved. The diffusion weighted sequences are normal. Contrast-enhanced images are somewhat degraded by the motion artifact. The thin section MP rage images are severely degraded. Axial T1 postcontrast image fails to demonstrate pathologic contrast enhancement.     Impression: Atrophy and chronic microvascular ischemia. No acute intracranial process. Electronically Signed: Demetris Lozano MD  3/21/2024 4:42 AM EDT  Workstation ID: [id number]    CT Head Without Contrast    Result Date: 3/20/2024  CT HEAD WO CONTRAST Date of Exam: 3/20/2024 8:08 PM EDT Indication: Neurologic decline Neurologic decline. Comparison: CT performed on the same date Technique: Axial CT images were  obtained of the head without contrast administration.  Automated exposure control and iterative construction methods were used. Findings: No intracranial hemorrhage. Gray-white matter differentiation is maintained without evidence of an acute infarction. Multiple foci of decreased attenuation are present within the subcortical, deep cerebral, and periventricular white matter consistent with chronic small vessel/microangiopathic ischemic changes. No extra-axial mass or collection. The ventricles and sulci are prominent commensurate with involutional changes. The posterior fossa appears grossly normal. Sellar and suprasellar structures are normal. Orbital and periorbital soft tissues are normal. The paranasal sinuses, ethmoid air cells, and mastoid air cells are aerated. The bony calvarium is intact.     Impression: No acute intracranial pathology. Electronically Signed: Cipriano Jones MD  3/20/2024 8:27 PM EDT  Workstation ID: [id number]    CT Angiogram Head w AI Analysis of LVO    Result Date: 3/20/2024  CT ANGIOGRAM HEAD W AI ANALYSIS OF LVO, CT CEREBRAL PERFUSION W WO CONTRAST, CT ANGIOGRAM NECK Date of Exam: 3/20/2024 1:02 PM EDT Indication: Neuro deficit, acute stroke suspected Neuro deficit, acute stroke suspected. Comparison: None available. Technique: CTA of the head was performed after the uneventful intravenous administration of . Reconstructed coronal and sagittal images were also obtained. In addition, a 3-D volume rendered image was created for interpretation. Automated exposure control and iterative reconstruction methods were used. FINDINGS: Vascular Findings: Atherosclerotic plaque within the right carotid bifurcation and right carotid siphon. The right common carotid, internal carotid, middle cerebral, anterior cerebral, vertebral, and posterior cerebral arteries are patent without abrupt cut off or aneurysmal dilation. There is absence of the A1 segment of the right anterior cerebral artery. There is  fetal origin of the right posterior cerebral artery. Atherosclerotic plaque within the left carotid bifurcation and left carotid siphon. The left common carotid, internal carotid, middle cerebral, anterior cerebral, vertebral, and posterior cerebral arteries are patent without abrupt cut off or aneurysmal dilation. Basilar artery appears patent and appears unremarkable. Non-vascular Findings: For description of nonvascular intracranial findings, please refer to the noncontrast head CT performed the same date. No acute abnormality is identified within the visualized soft tissue or bony structures of the neck. Cervical spondylosis is present. The visualized lung apices are clear. CT Perfusion: CBF (<30%) volume: 0 mL Tmax (>6.0s) volume: 3 mL Mismatch volume: 3 mL Mismatch ratio: Infinite     1.No intracranial large vessel occlusion is identified. 2.No significant stenosis of the bilateral internal carotid arteries. 3.CT perfusion study demonstrates a tiny focus of prolonged Tmax greater than 6 seconds (3 mL) within the right occipital lobe. This could be artifactual. Tiny focus of ischemia cannot be excluded. Please correlate with MRI. Electronically Signed: Maurice Lam MD  3/20/2024 1:48 PM EDT  Workstation ID: [id number]    CT Angiogram Neck    Result Date: 3/20/2024  CT ANGIOGRAM HEAD W AI ANALYSIS OF LVO, CT CEREBRAL PERFUSION W WO CONTRAST, CT ANGIOGRAM NECK Date of Exam: 3/20/2024 1:02 PM EDT Indication: Neuro deficit, acute stroke suspected Neuro deficit, acute stroke suspected. Comparison: None available. Technique: CTA of the head was performed after the uneventful intravenous administration of . Reconstructed coronal and sagittal images were also obtained. In addition, a 3-D volume rendered image was created for interpretation. Automated exposure control and iterative reconstruction methods were used. FINDINGS: Vascular Findings: Atherosclerotic plaque within the right carotid bifurcation and right  carotid siphon. The right common carotid, internal carotid, middle cerebral, anterior cerebral, vertebral, and posterior cerebral arteries are patent without abrupt cut off or aneurysmal dilation. There is absence of the A1 segment of the right anterior cerebral artery. There is fetal origin of the right posterior cerebral artery. Atherosclerotic plaque within the left carotid bifurcation and left carotid siphon. The left common carotid, internal carotid, middle cerebral, anterior cerebral, vertebral, and posterior cerebral arteries are patent without abrupt cut off or aneurysmal dilation. Basilar artery appears patent and appears unremarkable. Non-vascular Findings: For description of nonvascular intracranial findings, please refer to the noncontrast head CT performed the same date. No acute abnormality is identified within the visualized soft tissue or bony structures of the neck. Cervical spondylosis is present. The visualized lung apices are clear. CT Perfusion: CBF (<30%) volume: 0 mL Tmax (>6.0s) volume: 3 mL Mismatch volume: 3 mL Mismatch ratio: Infinite     1.No intracranial large vessel occlusion is identified. 2.No significant stenosis of the bilateral internal carotid arteries. 3.CT perfusion study demonstrates a tiny focus of prolonged Tmax greater than 6 seconds (3 mL) within the right occipital lobe. This could be artifactual. Tiny focus of ischemia cannot be excluded. Please correlate with MRI. Electronically Signed: Maurice Lam MD  3/20/2024 1:48 PM EDT  Workstation ID: [id number]    CT CEREBRAL PERFUSION WITH & WITHOUT CONTRAST    Result Date: 3/20/2024  CT ANGIOGRAM HEAD W AI ANALYSIS OF LVO, CT CEREBRAL PERFUSION W WO CONTRAST, CT ANGIOGRAM NECK Date of Exam: 3/20/2024 1:02 PM EDT Indication: Neuro deficit, acute stroke suspected Neuro deficit, acute stroke suspected. Comparison: None available. Technique: CTA of the head was performed after the uneventful intravenous administration of .  Reconstructed coronal and sagittal images were also obtained. In addition, a 3-D volume rendered image was created for interpretation. Automated exposure control and iterative reconstruction methods were used. FINDINGS: Vascular Findings: Atherosclerotic plaque within the right carotid bifurcation and right carotid siphon. The right common carotid, internal carotid, middle cerebral, anterior cerebral, vertebral, and posterior cerebral arteries are patent without abrupt cut off or aneurysmal dilation. There is absence of the A1 segment of the right anterior cerebral artery. There is fetal origin of the right posterior cerebral artery. Atherosclerotic plaque within the left carotid bifurcation and left carotid siphon. The left common carotid, internal carotid, middle cerebral, anterior cerebral, vertebral, and posterior cerebral arteries are patent without abrupt cut off or aneurysmal dilation. Basilar artery appears patent and appears unremarkable. Non-vascular Findings: For description of nonvascular intracranial findings, please refer to the noncontrast head CT performed the same date. No acute abnormality is identified within the visualized soft tissue or bony structures of the neck. Cervical spondylosis is present. The visualized lung apices are clear. CT Perfusion: CBF (<30%) volume: 0 mL Tmax (>6.0s) volume: 3 mL Mismatch volume: 3 mL Mismatch ratio: Infinite     1.No intracranial large vessel occlusion is identified. 2.No significant stenosis of the bilateral internal carotid arteries. 3.CT perfusion study demonstrates a tiny focus of prolonged Tmax greater than 6 seconds (3 mL) within the right occipital lobe. This could be artifactual. Tiny focus of ischemia cannot be excluded. Please correlate with MRI. Electronically Signed: Maurice Lam MD  3/20/2024 1:48 PM EDT  Workstation ID: [id number]    XR Chest 1 View    Result Date: 3/20/2024  XR CHEST 1 VW Date of Exam: 3/20/2024 1:18 PM EDT Indication: Acute  Stroke Protocol (onset < 12 hrs) Comparison: CT chest September 12, 2023 Findings: The heart not definitely enlarged. The lungs seem clear. There are no pleural effusions. The visualized osseous structures do not appear unusual.     Impression: 1.An acute pulmonary process is not apparent. Electronically Signed: Roger Herring MD  3/20/2024 1:37 PM EDT  Workstation ID: [id number]    CT Head Without Contrast Stroke Protocol    Result Date: 3/20/2024  CT HEAD WO CONTRAST STROKE PROTOCOL Date of Exam: 3/20/2024 12:59 PM EDT Indication: Neuro deficit, acute, stroke suspected Neuro deficit, acute stroke suspected. Comparison: None available. Technique: Axial CT images were obtained of the head without contrast administration.  Reconstructed coronal images were also obtained. Automated exposure control and iterative construction methods were used. Scan Time: 1256 hours Results discussed with stroke team at 1259 hours. Findings: There is mild atrophy. There is mild periventricular hypodensity compatible with chronic small vessel ischemic insult. There is no acute mass effect or edema. There is calcification of the right vertebral artery at the skull base. There is no acute mass effect or edema. There are no findings suspicious for acute CVA or hemorrhage. There is moderate polypoid mucosal thickening of the inferior maxillary sinuses.     Impression: Chronic findings. No acute process. Electronically Signed: Earnestine Ramirez MD  3/20/2024 1:09 PM EDT  Workstation ID: [id number]       During this visit the following were done:  Labs Reviewed [x]    Labs Ordered []    Radiology Reports Reviewed [x]    Radiology Ordered []    EKG, echo, and/or stress test reviewed [x]    EEG results reviewed  []    EEG reviewed and interpreted per myself   []    Discussed case with neurointerventionalist or neuroradiologist []    Referring Provider Records Reviewed []    ER Records Reviewed []    Hospital Records Reviewed []    History Obtained From  Family []    Radiological images view and Interpreted per myself [x]    Case Discussed with referring provider []     Decision to obtain and request outside records  []        Assessment and Plan     HA, AMS and stroke-like symptoms in the setting of low-grade fever, suspect a viral syndrome. MRI brain negative for acute changes, personally reviewed. Favor TME and migraine in the setting of a viral syndrome. He does not have nuchal rigidity to suggest meningitis, MRI brain is not suggestive of encephalitis. Clinically, he is improving. Question of a small right occipital stroke as well.   - Observation on telemetry.   - Continue current meds except for Plavix which I am stopping upon consideration on an LP.   - Continue supportive care.   - Ammonia   - LP if symptoms persists.     Thanks.          Electronically signed by Kev Rushing MD on 3/21/2024 at 12:36 EDT

## 2024-03-21 NOTE — THERAPY EVALUATION
Patient Name: Patrice Howard  : 1945    MRN: [id number]                              Today's Date: 3/21/2024       Admit Date: 3/20/2024    Visit Dx:     ICD-10-CM ICD-9-CM   1. Altered mental status, unspecified altered mental status type  R41.82 780.97   2. Difficulty with speech  R47.9 784.59   3. Dysphagia, unspecified type  R13.10 787.20     Patient Active Problem List   Diagnosis    Aphasia    Alcohol use    History of prostate cancer    HTN (hypertension)    Fever    Acute metabolic encephalopathy     Past Medical History:   Diagnosis Date    Diabetes     Elevated PSA     Prostate cancer      History reviewed. No pertinent surgical history.   General Information       Row Name 24 1519          Physical Therapy Time and Intention    Document Type evaluation  -CD     Mode of Treatment physical therapy  -CD       Row Name 24 1519          General Information    Patient Profile Reviewed yes  -CD     Prior Level of Function independent:;all household mobility;community mobility;ADL's;driving;using stairs  Pt is retired Proffessor/Artist, lives alone. No AD/DME reported at baseline for ADL completion or related mobility.  -CD     Existing Precautions/Restrictions fall   PT LETHARGIC, DECREASED PROCESSING, RECOMMEND TRIAL OF ORTHOSTATIC BP NEXT SESSION DUE TO REPORTS OF SEVERAL EPISODES OF NEAR SYNCOPE PTA.  -CD     Barriers to Rehab medically complex  -CD       Row Name 24 1519          Living Environment    People in Home alone  -CD       Row Name 24 1519          Home Main Entrance    Number of Stairs, Main Entrance five  -CD     Stair Railings, Main Entrance railings safe and in good condition  -CD       Row Name 24 1519          Stairs Within Home, Primary    Stairs, Within Home, Primary CAN STAY ON MAIN LEVEL IF NEEDED.  -CD     Number of Stairs, Within Home, Primary twelve  -CD       Row Name 24 1519          Cognition    Orientation Status (Cognition) oriented  to;person;verbal cues/prompts needed for orientation;place;time  -CD       Row Name 03/21/24 1519          Safety Issues, Functional Mobility    Safety Issues Affecting Function (Mobility) ability to follow commands;awareness of need for assistance;insight into deficits/self-awareness;positioning of assistive device;impulsivity;safety precautions follow-through/compliance;safety precaution awareness;sequencing abilities;problem-solving  -CD     Impairments Affecting Function (Mobility) balance;cognition;endurance/activity tolerance;strength;postural/trunk control  -CD     Cognitive Impairments, Mobility Safety/Performance awareness, need for assistance;insight into deficits/self-awareness;problem-solving/reasoning;safety precaution awareness;safety precaution follow-through;sequencing abilities  -CD     Comment, Safety Issues/Impairments (Mobility) SEVERAL EPISODES OF LOB TO THE R, DECREASED PROCESSING, LETHARGIC, UNABLE TO FOLLOW COMMANDS CONSISTENTLY.  -CD               User Key  (r) = Recorded By, (t) = Taken By, (c) = Cosigned By      Initials Name Provider Type    CD Antonieta Sousa, PT Physical Therapist                   Mobility       Row Name 03/21/24 1526          Bed Mobility    Comment, (Bed Mobility) PT SITTING EOB UPON ARRIVAL WITH O.T. AND SON PRESENT.  -CD       Row Name 03/21/24 1526          Transfers    Comment, (Transfers) SIT TO STAND VIA B UE SUPPORT FROM EOB AND COMMODE. ATTEMPTED USE OF R WALKER BUT UNABLE TO SEQUENCE SAFELY OR FOLLOW COMMANDS SO RETURNED TO B UE SUPPORT.  -CD       Row Name 03/21/24 1526          Sit-Stand Transfer    Sit-Stand [place] (Transfers) minimum assist (75% patient effort);verbal cues  -CD     Assistive Device (Sit-Stand Transfers) walker, front-wheeled  -CD       Row Name 03/21/24 1526          Gait/Stairs (Locomotion)    Gait/Stairs Locomotion gait/ambulation independence  -CD     [place] Level (Gait) moderate assist (50% patient effort);2 person  assist;verbal cues  -CD     Assistive Device (Gait) --  B UE SUPPORT.  -CD     Distance in Feet (Gait) 32  18+18  -CD     Deviations/Abnormal Patterns (Gait) stride length decreased;weight shifting decreased;gait speed decreased;festinating/shuffling  -CD     Bilateral Gait Deviations forward flexed posture;heel strike decreased  -CD     Comment, (Gait/Stairs) SEVERAL EPISODES OF LOB TO THE R WHILE IN BATHROOM. UNABLE TO FOLLOW COMMANDS FOR USE OF R WALKER. STAGGERING GAIT.  -CD               User Key  (r) = Recorded By, (t) = Taken By, (c) = Cosigned By      Initials Name Provider Type    CD Antonieta Sousa, PT Physical Therapist                   Obj/Interventions       Row Name 03/21/24 1530          Range of Motion Comprehensive    General Range of Motion bilateral lower extremity ROM WFL  -CD       Row Name 03/21/24 1530          Strength Comprehensive (MMT)    General Manual Muscle Testing (MMT) Assessment lower extremity strength deficits identified  -CD     Comment, General Manual Muscle Testing (MMT) Assessment PT UNABLE TO  FOLLOW COMMANDS FOR MMT. B LE GROSSLY 3+ TO 4/5 WITH GAIT SHORT DISTANCE.  -CD       Row Name 03/21/24 1530          Motor Skills    Motor Skills coordination;functional endurance  -CD     Functional Endurance O2 SAT STABLE ON RA.  -CD       Row Name 03/21/24 1530          Balance    Balance Assessment sitting dynamic balance;sitting static balance;sit to stand dynamic balance;standing static balance;standing dynamic balance  -CD     Static Sitting Balance contact guard  -CD     Dynamic Sitting Balance minimal assist  -CD     Position, Sitting Balance unsupported;sitting edge of bed  -CD     Sit to Stand Dynamic Balance minimal assist;2-person assist  -CD     Static Standing Balance minimal assist;verbal cues  -CD     Dynamic Standing Balance moderate assist;verbal cues  -CD     Position/Device Used, Standing Balance supported  B UE SUPPORT.  -CD     Balance Interventions  sitting;standing;sit to stand;supported;static;dynamic  -CD     Comment, Balance SEE GAIT NOTE. LETHARGY AND DECREASED COGNITIVE STATUS LIKELY MAIN CONTRIBUTING FACTOR TO IMPAIRED BALANCE.  -CD               User Key  (r) = Recorded By, (t) = Taken By, (c) = Cosigned By      Initials Name Provider Type    CD Antonieta Sousa, PT Physical Therapist                   Goals/Plan       Row Name 03/21/24 1540          Bed Mobility Goal 1 (PT)    Activity/Assistive Device (Bed Mobility Goal 1, PT) sit to supine/supine to sit  -CD     [place] Level/Cues Needed (Bed Mobility Goal 1, PT) contact guard required  -CD     Time Frame (Bed Mobility Goal 1, PT) long term goal (LTG);2 weeks  -CD       Row Name 03/21/24 1540          Transfer Goal 1 (PT)    Activity/Assistive Device (Transfer Goal 1, PT) sit-to-stand/stand-to-sit;bed-to-chair/chair-to-bed;walker, rolling  -CD     [place] Level/Cues Needed (Transfer Goal 1, PT) contact guard required  -CD     Time Frame (Transfer Goal 1, PT) long term goal (LTG);2 weeks  -CD       Row Name 03/21/24 1540          Gait Training Goal 1 (PT)    Activity/Assistive Device (Gait Training Goal 1, PT) gait (walking locomotion)  -CD     [place] Level (Gait Training Goal 1, PT) minimum assist (75% or more patient effort)  -CD     Distance (Gait Training Goal 1, PT) 200 FEET  -CD     Time Frame (Gait Training Goal 1, PT) long term goal (LTG);2 weeks  -CD       Row Name 03/21/24 1549          Therapy Assessment/Plan (PT)    Planned Therapy Interventions (PT) balance training;bed mobility training;gait training;patient/family education;strengthening;transfer training;postural re-education  -CD               User Key  (r) = Recorded By, (t) = Taken By, (c) = Cosigned By      Initials Name Provider Type    CD Antonieta Sousa PT Physical Therapist                   Clinical Impression       Row Name 03/21/24 1531          Pain    Pretreatment Pain Rating 0/10 - no pain  -CD      Posttreatment Pain Rating 0/10 - no pain  -CD       Row Name 03/21/24 1536          Plan of Care Review    Plan of Care Reviewed With patient;son  -CD     Outcome Evaluation PT PRESENTS WITH EVOLVING SYMPTOMS TO INCLUDE IMPAIRED BALANCE, DECREASED COGNITION, GENERALIZED WEAKNESS, AND DECLINE IN FUNCTIONAL MOBILITY. PT IS COOPERATIVE, BUT HAS DIFFICULTY FOLLOWING COMMANDS AND NEEDS MAX CUES FOR SAFETY AWARENESS AND SEQUENCING MOBILITY.  REQUIRED MOD ASSIST OF 2 VIA B UE SUPPORT TO AMBULATE SHORT DISTANCE. RECOMMEND IRF AT D/C.  -CD       Row Name 03/21/24 1536          Therapy Assessment/Plan (PT)    Patient/Family Therapy Goals Statement (PT) DID NOT STATE.  -CD     Rehab Potential (PT) good, to achieve stated therapy goals  -CD     Criteria for Skilled Interventions Met (PT) yes;meets criteria;skilled treatment is necessary  -CD     Therapy Frequency (PT) daily  -CD     Predicted Duration of Therapy Intervention (PT) 2 WEEKS  -CD       Row Name 03/21/24 1530          Vital Signs    Pre Systolic BP Rehab 125  -CD     Pre Treatment Diastolic BP 76  -CD     Post Systolic BP Rehab 137  -CD     Post Treatment Diastolic BP 64  -CD     Pretreatment Heart Rate (beats/min) 47  -CD     Posttreatment Heart Rate (beats/min) 44  -CD     Pre SpO2 (%) 92  -CD     O2 Delivery Pre Treatment room air  -CD     O2 Delivery Intra Treatment room air  -CD     Post SpO2 (%) 92  -CD     O2 Delivery Post Treatment room air  -CD     Pre Patient Position Supine  -CD     Intra Patient Position Standing  -CD     Post Patient Position Sitting  -CD       Row Name 03/21/24 7070          Positioning and Restraints    Pre-Treatment Position in bed  -CD     Post Treatment Position chair  -CD     In Chair reclined;call light within reach;encouraged to call for assist;exit alarm on;legs elevated;notified nsg;with family/caregiver;waffle cushion;on mechanical lift sling  -CD               User Key  (r) = Recorded By, (t) = Taken By, (c) = Cosigned By       Initials Name Provider Type    CD Antonieta Sousa, PT Physical Therapist                   Outcome Measures       Row Name 03/21/24 1542          How much help from another person do you currently need...    Turning from your back to your side while in flat bed without using bedrails? 3  -CD     Moving from lying on back to sitting on the side of a flat bed without bedrails? 3  -CD     Moving to and from a bed to a chair (including a wheelchair)? 2  -CD     Standing up from a chair using your arms (e.g., wheelchair, bedside chair)? 3  -CD     Climbing 3-5 steps with a railing? 2  -CD     To walk in hospital room? 2  -CD     AM-PAC 6 Clicks Score (PT) 15  -CD     Highest Level of Mobility Goal 4 --> Transfer to chair/commode  -CD       Row Name 03/21/24 1542 03/21/24 1043       Modified [place] Scale    Modified [place] Scale 4 - Moderately severe disability.  Unable to walk without assistance, and unable to attend to own bodily needs without assistance.  -CD 4 - Moderately severe disability.  Unable to walk without assistance, and unable to attend to own bodily needs without assistance.  -CS      Row Name 03/21/24 1542 03/21/24 1043       Functional Assessment    Outcome Measure Options AM-PAC 6 Clicks Basic Mobility (PT);Modified [place]  -CD AM-PAC 6 Clicks Daily Activity (OT);Modified [place]  -CS              User Key  (r) = Recorded By, (t) = Taken By, (c) = Cosigned By      Initials Name Provider Type    CD Antonieta Sousa, PT Physical Therapist    Jairo Armas OT Occupational Therapist                                 Physical Therapy Education       Title: PT OT SLP Therapies (In Progress)       Topic: Physical Therapy (Done)       Point: Mobility training (Done)       Learning Progress Summary             Patient Acceptance, E, VU,NR by CD at 3/21/2024 1544    Comment: BENEFITS OF OOB ACTIVITY, SAFETY WITH MOBILITY, PROGRESSION OF POC, D/C PLANNING,   Family Acceptance, E, VU,NR by CD at 3/21/2024  1544    Comment: BENEFITS OF OOB ACTIVITY, SAFETY WITH MOBILITY, PROGRESSION OF POC, D/C PLANNING,                         Point: Home exercise program (Done)       Learning Progress Summary             Patient Acceptance, E, VU,NR by CD at 3/21/2024 1544    Comment: BENEFITS OF OOB ACTIVITY, SAFETY WITH MOBILITY, PROGRESSION OF POC, D/C PLANNING,   Family Acceptance, E, VU,NR by CD at 3/21/2024 1544    Comment: BENEFITS OF OOB ACTIVITY, SAFETY WITH MOBILITY, PROGRESSION OF POC, D/C PLANNING,                         Point: Body mechanics (Done)       Learning Progress Summary             Patient Acceptance, E, VU,NR by CD at 3/21/2024 1544    Comment: BENEFITS OF OOB ACTIVITY, SAFETY WITH MOBILITY, PROGRESSION OF POC, D/C PLANNING,   Family Acceptance, E, VU,NR by CD at 3/21/2024 1544    Comment: BENEFITS OF OOB ACTIVITY, SAFETY WITH MOBILITY, PROGRESSION OF POC, D/C PLANNING,                         Point: Precautions (Done)       Learning Progress Summary             Patient Acceptance, E, VU,NR by CD at 3/21/2024 1544    Comment: BENEFITS OF OOB ACTIVITY, SAFETY WITH MOBILITY, PROGRESSION OF POC, D/C PLANNING,   Family Acceptance, E, VU,NR by CD at 3/21/2024 1544    Comment: BENEFITS OF OOB ACTIVITY, SAFETY WITH MOBILITY, PROGRESSION OF POC, D/C PLANNING,                                         User Key       Initials Effective Dates Name Provider Type Discipline    CD 02/03/23 -  Antonieta Sousa, PT Physical Therapist PT                  PT Recommendation and Plan  Planned Therapy Interventions (PT): balance training, bed mobility training, gait training, patient/family education, strengthening, transfer training, postural re-education  Plan of Care Reviewed With: patient, son  Outcome Evaluation: PT PRESENTS WITH EVOLVING SYMPTOMS TO INCLUDE IMPAIRED BALANCE, DECREASED COGNITION, GENERALIZED WEAKNESS, AND DECLINE IN FUNCTIONAL MOBILITY. PT IS COOPERATIVE, BUT HAS DIFFICULTY FOLLOWING COMMANDS AND NEEDS MAX CUES  FOR SAFETY AWARENESS AND SEQUENCING MOBILITY.  REQUIRED MOD ASSIST OF 2 VIA B UE SUPPORT TO AMBULATE SHORT DISTANCE. RECOMMEND IRF AT D/C.     Time Calculation:   PT Evaluation Complexity  History, PT Evaluation Complexity: 3 or more personal factors and/or comorbidities  Examination of Body Systems (PT Eval Complexity): total of 4 or more elements  Clinical Presentation (PT Evaluation Complexity): evolving  Clinical Decision Making (PT Evaluation Complexity): moderate complexity  Overall Complexity (PT Evaluation Complexity): moderate complexity     PT Charges       Row Name 03/21/24 1549             Time Calculation    Start Time 0957  -CD      PT Received On 03/21/24  -CD      PT Goal Re-Cert Due Date 03/31/24  -CD         Time Calculation- PT    Total Timed Code Minutes- PT 9 minute(s)  -CD         Timed Charges    92503 - Gait Training Minutes  9  -CD         Untimed Charges    PT Eval/Re-eval Minutes 60  -CD         Total Minutes    Timed Charges Total Minutes 9  -CD      Untimed Charges Total Minutes 60  -CD       Total Minutes 69  -CD                User Key  (r) = Recorded By, (t) = Taken By, (c) = Cosigned By      Initials Name Provider Type    CD Antonieta Sousa, PT Physical Therapist                  Therapy Charges for Today       Code Description Service Date Service Provider Modifiers Qty    77285467375 HC GAIT TRAINING EA 15 MIN 3/21/2024 Antonieta Sousa, PT GP 1    99970010057 HC PT EVAL MOD COMPLEXITY 4 3/21/2024 Antonieta Sousa, PT GP 1            PT G-Codes  Outcome Measure Options: AM-PAC 6 Clicks Basic Mobility (PT), Modified [place]  AM-PAC 6 Clicks Score (PT): 15  AM-PAC 6 Clicks Score (OT): 15  Modified [place] Scale: 4 - Moderately severe disability.  Unable to walk without assistance, and unable to attend to own bodily needs without assistance.  PT Discharge Summary  Anticipated Discharge Disposition (PT): inpatient rehabilitation facility    Antonieta Sousa, AMARILIS  3/21/2024

## 2024-03-21 NOTE — THERAPY EVALUATION
Acute Care - Speech Language Pathology   Swallow Initial Evaluation [facility]  Clinical Swallow Evaluation       Patient Name: Patrice Howard  : 1945  MRN: [id number]  Today's Date: 3/21/2024               Admit Date: 3/20/2024    Visit Dx:     ICD-10-CM ICD-9-CM   1. Altered mental status, unspecified altered mental status type  R41.82 780.97   2. Difficulty with speech  R47.9 784.59   3. Dysphagia, unspecified type  R13.10 787.20     Patient Active Problem List   Diagnosis    Aphasia    Alcohol use    History of prostate cancer    HTN (hypertension)    Fever     Past Medical History:   Diagnosis Date    Diabetes     Elevated PSA     Prostate cancer      History reviewed. No pertinent surgical history.    SLP Recommendation and Plan  SLP Swallowing Diagnosis: R/O pharyngeal dysphagia (24)  SLP Diet Recommendation: NPO (24)  Recommended Precautions and Strategies: general aspiration precautions (24)  SLP Rec. for Method of Medication Administration: meds whole, meds crushed, with puree, as tolerated (24)     Monitor for Signs of Aspiration: yes, notify SLP if any concerns (24)  Recommended Diagnostics: reassess via clinical swallow evaluation (24)  Swallow Criteria for Skilled Therapeutic Interventions Met: demonstrates skilled criteria (24)  Anticipated Discharge Disposition (SLP): unknown, anticipate therapy at next level of care (24)  Rehab Potential/Prognosis, Swallowing: good, to achieve stated therapy goals (24)     Predicted Duration Therapy Intervention (Days): until discharge (24)  Oral Care Recommendations: Oral Care BID/PRN, Suction toothbrush (24)                                               SWALLOW EVALUATION (Last 72 Hours)       SLP Adult Swallow Evaluation       Row Name 24                   Rehab Evaluation    Document Type evaluation  -        Subjective  Information no complaints  -        Patient Observations lethargic  -        Patient/Family/Caregiver Comments/Observations Son present  -        Patient Effort fair  -        Symptoms Noted During/After Treatment none  -           General Information    Patient Profile Reviewed yes  -        Pertinent History Of Current Problem Adm w/ AMS. Hx: diabetes, tobacco use, daily alcohol use, prostate CA s/p resection w/ partial XRT, HTN. MRI negative for acute intracranial abnormalities  -        Current Method of Nutrition NPO  -        Precautions/Limitations, Vision difficult to assess;other (see comments)  pt w/ eyes closed majority of eval  -        Precautions/Limitations, Hearing difficult to assess  -        Prior Level of Function-Communication unknown  -        Prior Level of Function-Swallowing no diet consistency restrictions;other (see comments)  per family  -        Plans/Goals Discussed with patient;family;agreed upon  [facility]        Barriers to Rehab none identified  -        Patient's Goals for Discharge patient did not state  -        Family Goals for Discharge family did not state  -           Pain    Additional Documentation Pain Scale: FACES Pre/Post-Treatment (Group)  -           Pain Scale: FACES Pre/Post-Treatment    Pain: FACES Scale, Pretreatment 0-->no hurt  -        Posttreatment Pain Rating 0-->no hurt  -           Oral Motor Structure and Function    Dentition Assessment natural, present and adequate  -        Secretion Management WNL/WFL  -        Mucosal Quality moist, healthy  -           Oral Musculature and Cranial Nerve Assessment    Oral Motor General Assessment generalized oral motor weakness  -           General Eating/Swallowing Observations    Respiratory Support Currently in Use room air  -        Eating/Swallowing Skills fed by SLP;self-fed  -        Positioning During Eating upright in bed  -        Utensils Used spoon;cup;straw  -         Consistencies Trialed ice chips;thin liquids;pureed  -           Clinical Swallow Eval    Pharyngeal Phase --  R/o pharyngeal dysphagia  -        Clinical Swallow Evaluation Summary Pt lethargic, cues required t/o for pt to remain awake/alert. Pt became agitated quickly, attempting to get out of bed; RN present/aware. Delayed cough x1 w/ initial thin liquid trial via tsp, u/a to replicate accross multiple additional trials. No s/s w/ thins via cup/straw; even when consecutive sips. No s/s w/ pureed. Given inability to remain awake/alert, u/a to safely recommend PO diet @ this time. Recommend cont NPO, essential meds ok crushed/whole in applesauce/pudding as tolerated. SLP will f/u for re-eval  -           SLP Evaluation Clinical Impression    SLP Swallowing Diagnosis R/O pharyngeal dysphagia  -        Functional Impact risk of aspiration/pneumonia  -        Rehab Potential/Prognosis, Swallowing good, to achieve stated therapy goals  -        Swallow Criteria for Skilled Therapeutic Interventions Met demonstrates skilled criteria  -           Recommendations    Predicted Duration Therapy Intervention (Days) until discharge  -        SLP Diet Recommendation NPO  -        Recommended Diagnostics reassess via clinical swallow evaluation  -        Recommended Precautions and Strategies general aspiration precautions  -        Oral Care Recommendations Oral Care BID/PRN;Suction toothbrush  -        SLP Rec. for Method of Medication Administration meds whole;meds crushed;with puree;as tolerated  -        Monitor for Signs of Aspiration yes;notify SLP if any concerns  -        Anticipated Discharge Disposition (SLP) unknown;anticipate therapy at next level of care  -                  User Key  (r) = Recorded By, (t) = Taken By, (c) = Cosigned By      Initials Name Effective Dates     Olga Casey, MS [facility]-SLP 05/12/23 -                     EDUCATION  The patient has been educated  in the following areas:   Dysphagia (Swallowing Impairment) NPO rationale.              Time Calculation:    Time Calculation- SLP       Row Name 03/21/24 1051             Time Calculation- SLP    SLP Start Time 0910  -      SLP Received On 03/21/24  -         Untimed Charges    67981-AR Eval Oral Pharyng Swallow Minutes 40  -MH         Total Minutes    Untimed Charges Total Minutes 40  -       Total Minutes 40  -MH                User Key  (r) = Recorded By, (t) = Taken By, (c) = Cosigned By      Initials Name Provider Type     Olga Casey MS CCC-SLP Speech and Language Pathologist                    Therapy Charges for Today       Code Description Service Date Service Provider Modifiers Qty    45073312882 HC ST EVAL ORAL PHARYNG SWALLOW 3 3/21/2024 Olga Casey MS CCC-SLP GN 1                 MS BAIRON Mora  3/21/2024

## 2024-03-21 NOTE — CONSULTS
Clinical Nutrition   Nutrition Assessment  Reason for Visit: Identified at risk by screening criteria, Consult, Reduced oral intake    Patient Name: Patrice Howard  YOB: 1945  MRN: [id number]  Date of Encounter: 03/21/24 14:34 EDT  Admission date: 3/20/2024    Comments:    Patient resting at time of visit. Spoke w/ family at bedside. Family was unsure of patient's energy intake PTA. Stated patient lives alone and in different city than family. However, reported that when he has been around the patient eating, he hasn't noticed any significant changes. Stated he has noted that patient has lost some weight over the past several years, but is unsure of how much or why. SLP amara today recs for NPO. Provider ordered regular diet for patient. No nutrition POC at this time, however, RDN will follow up as able with patient to obtain an accurate history as significant weight loss is noted in EMR.    Nutrition Assessment   Admission Diagnosis:  Aphasia [R47.01]  Acute metabolic encephalopathy [G93.41]    Problem List:    Aphasia    Alcohol use    History of prostate cancer    HTN (hypertension)    Fever    Acute metabolic encephalopathy      PMH:   He  has a past medical history of Diabetes, Elevated PSA, and Prostate cancer.    PSH:  He  has no past surgical history on file.    Applicable Nutrition Concerns:   Skin:  Oral:  GI:    Applicable Interval History:   3/21: SLP eval: NPO    Reported/Observed/Food/Nutrition Related History:     3/21  Patient resting at time of visit. Spoke w/ family at bedside. Family was unsure of patient's energy intake PTA. Stated patient lives alone and in different city than family. However, reported that when he has been around the patient eating, he hasn't noticed any significant changes. Stated he has noted that patient has lost some weight over the past several years, but is unsure of how much or why. SLP eval today recs for NPO. Provider ordered regular  "diet for patient. No nutrition POC at this time, however, RDN will follow up as able with patient to obtain an accurate history as significant weight loss is noted in EMR.    Anthropometrics     Flowsheet Rows      Flowsheet Row First Filed Value   Admission Height 170.2 cm (67\") Documented at 03/20/2024 1238   Admission Weight 61.2 kg (135 lb) Documented at 03/20/2024 1238            Height: Height: 170.2 cm (67\")  Last Filed Weight: Weight: 61.2 kg (135 lb) (03/20/24 1238)  Method:    BMI: BMI (Calculated): 21.1  CBW: 135#  2/8/24: 142#  5% significant weight loss noted in 1 month    Nutrition Focused Physical Exam     Date:  3/21       Unable to perform due to Potential for patient discomfort     Current Nutrition Prescription   PO: Diet: Regular/House; Texture: Regular (IDDSI 7); Fluid Consistency: Thin (IDDSI 0)  Oral Nutrition Supplement: n/a  Intake: Insufficient data    Nutrition Diagnosis   Date:  3/21            Updated:    Problem No nutrition diagnosis at this time   Etiology    Signs/Symptoms    Status:     Goal:   General: Maintain nutrition  PO: Establish PO, Meet estimated needs  EN/PN: N/A    Nutrition Intervention      Follow treatment progress, Await begin PO        Monitoring/Evaluation:   Per protocol, PO intake, POC/GOC, Swallow function      Emilia Bowers RD  Time Spent: 20min  "

## 2024-03-21 NOTE — PLAN OF CARE
Goal Outcome Evaluation:                     Anticipated Discharge Disposition (SLP): unknown, anticipate therapy at next level of care          SLP Swallowing Diagnosis: R/O pharyngeal dysphagia (03/21/24 3091)

## 2024-03-21 NOTE — PLAN OF CARE
Goal Outcome Evaluation:  Plan of Care Reviewed With: patient        Progress: no change  Outcome Evaluation: Baseline ADL completion limited by strength, balance, coordination, and cognitive deficits warranting skilled IP OT services to promote return to PLOF. Rec d/c to IRF for best functional outcomes. Based on today's performance, recommend d/c to IRF for best funcitonal outcomes.      Anticipated Discharge Disposition (OT): inpatient rehabilitation facility

## 2024-03-21 NOTE — PLAN OF CARE
Goal Outcome Evaluation:  Plan of Care Reviewed With: patient, son           Outcome Evaluation: PT PRESENTS WITH EVOLVING SYMPTOMS TO INCLUDE IMPAIRED BALANCE, DECREASED COGNITION, GENERALIZED WEAKNESS, AND DECLINE IN FUNCTIONAL MOBILITY. PT IS COOPERATIVE, BUT HAS DIFFICULTY FOLLOWING COMMANDS AND NEEDS MAX CUES FOR SAFETY AWARENESS AND SEQUENCING MOBILITY.  REQUIRED MOD ASSIST OF 2 VIA B UE SUPPORT TO AMBULATE SHORT DISTANCE. RECOMMEND IRF AT D/C.      Anticipated Discharge Disposition (PT): inpatient rehabilitation facility

## 2024-03-21 NOTE — THERAPY EVALUATION
Patient Name: Patrice Howard  : 1945    MRN: [id number]                              Today's Date: 3/21/2024       Admit Date: 3/20/2024    Visit Dx:     ICD-10-CM ICD-9-CM   1. Altered mental status, unspecified altered mental status type  R41.82 780.97   2. Difficulty with speech  R47.9 784.59   3. Dysphagia, unspecified type  R13.10 787.20     Patient Active Problem List   Diagnosis    Aphasia    Alcohol use    History of prostate cancer    HTN (hypertension)    Fever     Past Medical History:   Diagnosis Date    Diabetes     Elevated PSA     Prostate cancer      History reviewed. No pertinent surgical history.   General Information       Row Name 24 1020          OT Time and Intention    Document Type evaluation  -CS     Mode of Treatment occupational therapy  -CS       Row Name 24 1020          General Information    Patient Profile Reviewed yes  -CS     Prior Level of Function independent:;all household mobility;ADL's;driving;using stairs  Pt is retired Proffessor/Artist, lives alone. No AD/DME reported at baseline for ADL completion or related mobility.  -CS     Existing Precautions/Restrictions fall;other (see comments)  delayed processing, lethargy  -CS     Barriers to Rehab none identified  -CS       Row Name 24 1020          Living Environment    People in Home alone  -CS       Row Name 24 1020          Home Main Entrance    Number of Stairs, Main Entrance five  -CS     Stair Railings, Main Entrance railings safe and in good condition  -CS       Row Name 24 1020          Stairs Within Home, Primary    Stairs, Within Home, Primary 2 level home, all needs can be met on main floor, walk-in shower on main floor, step-in tub on 2nd floor  -CS     Number of Stairs, Within Home, Primary twelve  -CS       Row Name 24 1020          Cognition    Orientation Status (Cognition) oriented to;person;verbal cues/prompts needed for orientation;place;situation;time;other (see  comments)  able to select correct answer when given options  -CS       Row Name 03/21/24 1020          Safety Issues, Functional Mobility    Safety Issues Affecting Function (Mobility) ability to follow commands;awareness of need for assistance;insight into deficits/self-awareness;problem-solving;safety precaution awareness;safety precautions follow-through/compliance;sequencing abilities  -CS     Impairments Affecting Function (Mobility) balance;cognition;endurance/activity tolerance;strength;postural/trunk control  -CS     Cognitive Impairments, Mobility Safety/Performance awareness, need for assistance;insight into deficits/self-awareness;problem-solving/reasoning;safety precaution awareness;safety precaution follow-through;sequencing abilities  -CS     Comment, Safety Issues/Impairments (Mobility) divided attention, easily distracted, lethargic, delayed processing  -CS               User Key  (r) = Recorded By, (t) = Taken By, (c) = Cosigned By      Initials Name Provider Type    CS Jairo Del Valle OT Occupational Therapist                     Mobility/ADL's       Row Name 03/21/24 1027          Bed Mobility    Bed Mobility supine-sit;scooting/bridging  -CS     Scooting/Bridging [place] (Bed Mobility) minimum assist (75% patient effort);1 person assist  -CS     Supine-Sit [place] (Bed Mobility) moderate assist (50% patient effort);1 person assist  -CS     Assistive Device (Bed Mobility) bed rails;head of bed elevated;draw sheet  -CS     Comment, (Bed Mobility) one step commands, tactile cues for initiating BLEs to EOB, increased assist at trunk to achieve sitting posture, mild L lean with sitting  -CS       Row Name 03/21/24 1027          Transfers    Transfers sit-stand transfer;toilet transfer  -CS     Comment, (Transfers) RUE support, tactile guidance for grab bar use at toilet, extra time for command following  -CS       Row Name 03/21/24 1027          Sit-Stand Transfer    Sit-Stand [place]  (Transfers) minimum assist (75% patient effort);verbal cues;nonverbal cues (demo/gesture)  -       Row Name 03/21/24 1027          Functional Mobility    Functional Mobility- Ind. Level minimum assist (75% patient effort);moderate assist (50% patient effort);1 person + 1 person to manage equipment  -     Functional Mobility- Safety Issues step length decreased;sequencing ability decreased;balance decreased during turns  -     Functional Mobility- Comment defer to PT for specifics, shuffled gait with difficult navigating environment, Min to ModA for RUE support  -CS     Patient was able to Ambulate yes  -       Row Name 03/21/24 1027          Activities of Daily Living    BADL Assessment/Intervention lower body dressing;toileting  -       Row Name 03/21/24 1027          Lower Body Dressing Assessment/Training    [place] Level (Lower Body Dressing) don;socks;dependent (less than 25% patient effort)  -CS     Position (Lower Body Dressing) edge of bed sitting  -CS     Comment, (Lower Body Dressing) limited by cognitive, coordination, and balance deficits  -       Row Name 03/21/24 1027          Toileting Assessment/Training    [place] Level (Toileting) perform perineal hygiene;adjust/manage clothing;moderate assist (50% patient effort)  -     Assistive Devices (Toileting) grab bar/safety frame  -CS     Position (Toileting) unsupported sitting;unsupported standing  -CS     Comment, (Toileting) tactile guidance for grab bar use at toilet  -               User Key  (r) = Recorded By, (t) = Taken By, (c) = Cosigned By      Initials Name Provider Type    Jairo Armas OT Occupational Therapist                   Obj/Interventions       Row Name 03/21/24 1031          Sensory Assessment (Somatosensory)    Sensory Assessment (Somatosensory) UE sensation intact;unable/difficult to assess  -CS     Sensory Assessment difficult to assess this date due to cognition, responds to lt touch bilaterally,  further detailed assessment warranted  -CS       Row Name 03/21/24 1031          Vision Assessment/Intervention    Visual Impairment/Limitations corrective lenses full-time  -CS       Row Name 03/21/24 1031          Range of Motion Comprehensive    General Range of Motion bilateral upper extremity ROM WFL  -       Row Name 03/21/24 1031          Strength Comprehensive (MMT)    General Manual Muscle Testing (MMT) Assessment upper extremity strength deficits identified  -CS     Comment, General Manual Muscle Testing (MMT) Assessment BUE grossly 4-/5  -CS       Row Name 03/21/24 1031          Motor Skills    Motor Skills coordination;functional endurance  -CS     Coordination bimanual skills;moderate impairment  -CS     Functional Endurance O2 sats stable on RA  -CS       Row Name 03/21/24 1031          Balance    Balance Assessment sitting static balance;sitting dynamic balance;standing dynamic balance;standing static balance  -CS     Static Sitting Balance contact guard  -CS     Dynamic Sitting Balance minimal assist  -CS     Position, Sitting Balance unsupported;sitting edge of bed  -CS     Static Standing Balance minimal assist  -CS     Dynamic Standing Balance moderate assist  -CS     Position/Device Used, Standing Balance supported  -CS     Balance Interventions sitting;standing;sit to stand;occupation based/functional task  -CS               User Key  (r) = Recorded By, (t) = Taken By, (c) = Cosigned By      Initials Name Provider Type    CS Jairo Del Valle, OT Occupational Therapist                   Goals/Plan       [facility] Name 03/21/24 1041          Transfer Goal 1 (OT)    Activity/Assistive Device (Transfer Goal 1, OT) bed-to-chair/chair-to-bed;toilet  -CS     [place] Level/Cues Needed (Transfer Goal 1, OT) standby assist  -CS     Time Frame (Transfer Goal 1, OT) long term goal (LTG);10 days  -CS     Strategies/Barriers (Transfers Goal 1, OT) AD recs per PT  -CS     Progress/Outcome (Transfer Goal 1,  OT) new goal  -CS       Row Name 03/21/24 1041          Dressing Goal 1 (OT)    Activity/Device (Dressing Goal 1, OT) lower body dressing  -CS     [place]/Cues Needed (Dressing Goal 1, OT) contact guard required  -CS     Time Frame (Dressing Goal 1, OT) long term goal (LTG);10 days  -CS     Progress/Outcome (Dressing Goal 1, OT) new goal  -CS       Row Name 03/21/24 1041          Grooming Goal 1 (OT)    Activity/Device (Grooming Goal 1, OT) hair care;oral care;wash face, hands  -CS     [place] (Grooming Goal 1, OT) standby assist  -CS     Time Frame (Grooming Goal 1, OT) long term goal (LTG);10 days  -CS     Strategies/Barriers (Grooming Goal 1, OT) sinkside  -CS     Progress/Outcome (Grooming Goal 1, OT) new goal  -CS       Row Name 03/21/24 1041          Strength Goal 1 (OT)    Strength Goal 1 (OT) Increase gross BUE strength 1 muscle grade to promote increased safety/Ind with ADL completion and related transfers  -CS     Time Frame (Strength Goal 1, OT) long term goal (LTG);10 days  -CS     Progress/Outcome (Strength Goal 1, OT) new goal  -CS       Row Name 03/21/24 1041          Therapy Assessment/Plan (OT)    Planned Therapy Interventions (OT) activity tolerance training;functional balance retraining;occupation/activity based interventions;ROM/therapeutic exercise;strengthening exercise;transfer/mobility retraining;patient/caregiver education/training;neuromuscular control/coordination retraining;adaptive equipment training  -CS               User Key  (r) = Recorded By, (t) = Taken By, (c) = Cosigned By      Initials Name Provider Type    CS Jairo Del Valle, OT Occupational Therapist                   Clinical Impression       Row Name 03/21/24 1033          Pain Assessment    Pretreatment Pain Rating 0/10 - no pain  -CS     Posttreatment Pain Rating 0/10 - no pain  -CS       Row Name 03/21/24 1033          Plan of Care Review    Plan of Care Reviewed With patient  -CS     Progress no change  -CS      Outcome Evaluation Baseline ADL completion limited by strength, balance, coordination, and cognitive deficits warranting skilled IP OT services to promote return to PLOF. Rec d/c to IRF for best functional outcomes. Based on today's performance, recommend d/c to IRF for best funcitonal outcomes.  -CS       Row Name 03/21/24 1033          Therapy Assessment/Plan (OT)    Rehab Potential (OT) good, to achieve stated therapy goals  -CS     Criteria for Skilled Therapeutic Interventions Met (OT) yes;meets criteria;skilled treatment is necessary  -CS     Therapy Frequency (OT) daily  -CS       Row Name 03/21/24 1033          Therapy Plan Review/Discharge Plan (OT)    Anticipated Discharge Disposition (OT) inpatient rehabilitation facility  -CS       Row Name 03/21/24 1033          Vital Signs    Pre Systolic BP Rehab 137  RN cleared for eval  -CS     Pre Treatment Diastolic BP 64  -CS     Post Systolic BP Rehab 125  -CS     Post Treatment Diastolic BP 76  -CS     Posttreatment Heart Rate (beats/min) 50  -CS     O2 Delivery Pre Treatment room air  -CS     O2 Delivery Intra Treatment room air  -CS     Post SpO2 (%) 98  -CS     O2 Delivery Post Treatment room air  -CS     Pre Patient Position Supine  -CS     Intra Patient Position Standing  -CS     Post Patient Position Sitting  -CS       Row Name 03/21/24 1033          Positioning and Restraints    Pre-Treatment Position in bed  -CS     Post Treatment Position chair  -CS     In Chair notified nsg;reclined;sitting;exit alarm on;encouraged to call for assist;call light within reach;waffle cushion;legs elevated  -CS               User Key  (r) = Recorded By, (t) = Taken By, (c) = Cosigned By      Initials Name Provider Type    CS Jairo Del Valle, OT Occupational Therapist                   Outcome Measures       Row Name 03/21/24 1043          How much help from another is currently needed...    Putting on and taking off regular lower body clothing? 2  -CS     Bathing  (including washing, rinsing, and drying) 2  -CS     Toileting (which includes using toilet bed pan or urinal) 2  -CS     Putting on and taking off regular upper body clothing 3  -CS     Taking care of personal grooming (such as brushing teeth) 3  -CS     Eating meals 3  -CS     AM-PAC 6 Clicks Score (OT) 15  -CS       Row Name 03/21/24 1043          Modified [place] Scale    Modified [place] Scale 4 - Moderately severe disability.  Unable to walk without assistance, and unable to attend to own bodily needs without assistance.  -CS       Row Name 03/21/24 1043          Functional Assessment    Outcome Measure Options AM-PAC 6 Clicks Daily Activity (OT);Modified [place]  -CS               User Key  (r) = Recorded By, (t) = Taken By, (c) = Cosigned By      Initials Name Provider Type    Jairo Armas OT Occupational Therapist                    Occupational Therapy Education       Title: PT OT SLP Therapies (In Progress)       Topic: Occupational Therapy (In Progress)       Point: ADL training (Done)       Description:   Instruct learner(s) on proper safety adaptation and remediation techniques during self care or transfers.   Instruct in proper use of assistive devices.                  Learning Progress Summary             Patient Acceptance, E,D, VU,DU by  at 3/21/2024 1044                         Point: Home exercise program (Not Started)       Description:   Instruct learner(s) on appropriate technique for monitoring, assisting and/or progressing therapeutic exercises/activities.                  Learner Progress:  Not documented in this visit.              Point: Precautions (Done)       Description:   Instruct learner(s) on prescribed precautions during self-care and functional transfers.                  Learning Progress Summary             Patient Acceptance, E,D, VU,DU by  at 3/21/2024 1044                         Point: Body mechanics (Done)       Description:   Instruct learner(s) on proper  positioning and spine alignment during self-care, functional mobility activities and/or exercises.                  Learning Progress Summary             Patient Acceptance, E,D, VU,DU by  at 3/21/2024 1044                                         User Key       Initials Effective Dates Name Provider Type Discipline     06/16/21 -  Jairo Del Valle OT Occupational Therapist OT                  OT Recommendation and Plan  Planned Therapy Interventions (OT): activity tolerance training, functional balance retraining, occupation/activity based interventions, ROM/therapeutic exercise, strengthening exercise, transfer/mobility retraining, patient/caregiver education/training, neuromuscular control/coordination retraining, adaptive equipment training  Therapy Frequency (OT): daily  Plan of Care Review  Plan of Care Reviewed With: patient  Progress: no change  Outcome Evaluation: Baseline ADL completion limited by strength, balance, coordination, and cognitive deficits warranting skilled IP OT services to promote return to PLOF. Rec d/c to IRF for best functional outcomes. Based on today's performance, recommend d/c to IRF for best funcitonal outcomes.     Time Calculation:   Evaluation Complexity (OT)  Review Occupational Profile/Medical/Therapy History Complexity: brief/low complexity  Assessment, Occupational Performance/Identification of Deficit Complexity: 1-3 performance deficits  Clinical Decision Making Complexity (OT): problem focused assessment/low complexity  Overall Complexity of Evaluation (OT): low complexity     Time Calculation- OT       Row Name 03/21/24 1045             Time Calculation- OT    OT Start Time 0945  -CS      OT Received On 03/21/24  -      OT Goal Re-Cert Due Date 03/31/24  -CS         Timed Charges    29788 - OT Self Care/Mgmt Minutes 10  -CS         Untimed Charges    OT Eval/Re-eval Minutes 47  -CS         Total Minutes    Timed Charges Total Minutes 10  -CS      Untimed Charges  Total Minutes 47  -CS       Total Minutes 57  -CS                User Key  (r) = Recorded By, (t) = Taken By, (c) = Cosigned By      Initials Name Provider Type    CS Jairo Del Valle, OT Occupational Therapist                  Therapy Charges for Today       Code Description Service Date Service Provider Modifiers Qty    [id number]  OT SELF CARE/MGMT/TRAIN EA 15 MIN 3/21/2024 Jairo Del Valle, OT GO 1    [id number]  OT EVAL LOW COMPLEXITY 4 3/21/2024 Jairo Del Valle OT GO 1                 Jairo Del Valle OT  3/21/2024

## 2024-03-22 ENCOUNTER — APPOINTMENT (OUTPATIENT)
Dept: CARDIOLOGY | Facility: HOSPITAL | Age: 79
End: 2024-03-22
Payer: MEDICARE

## 2024-03-22 PROBLEM — E44.0 MODERATE MALNUTRITION: Status: ACTIVE | Noted: 2024-03-22

## 2024-03-22 LAB
ALBUMIN SERPL-MCNC: 3.9 G/DL (ref 3.5–5.2)
ALBUMIN/GLOB SERPL: 1.9 G/DL
ALP SERPL-CCNC: 80 U/L (ref 39–117)
ALT SERPL W P-5'-P-CCNC: 15 U/L (ref 1–41)
AMMONIA BLD-SCNC: 13 UMOL/L (ref 16–60)
ANION GAP SERPL CALCULATED.3IONS-SCNC: 11 MMOL/L (ref 5–15)
ASCENDING AORTA: 4.2 CM
AST SERPL-CCNC: 19 U/L (ref 1–40)
BASOPHILS # BLD AUTO: 0.01 10*3/MM3 (ref 0–0.2)
BASOPHILS NFR BLD AUTO: 0.2 % (ref 0–1.5)
BH CV ECHO MEAS - AO MAX PG: 3 MMHG
BH CV ECHO MEAS - AO MEAN PG: 2 MMHG
BH CV ECHO MEAS - AO ROOT DIAM: 3.4 CM
BH CV ECHO MEAS - AO V2 MAX: 87.3 CM/SEC
BH CV ECHO MEAS - AO V2 VTI: 20.9 CM
BH CV ECHO MEAS - AVA(I,D): 2.09 CM2
BH CV ECHO MEAS - EDV(CUBED): 85.2 ML
BH CV ECHO MEAS - EDV(MOD-SP2): 78.2 ML
BH CV ECHO MEAS - EDV(MOD-SP4): 112 ML
BH CV ECHO MEAS - EF(MOD-BP): 55.5 %
BH CV ECHO MEAS - EF(MOD-SP2): 57.2 %
BH CV ECHO MEAS - EF(MOD-SP4): 55.2 %
BH CV ECHO MEAS - ESV(CUBED): 32.8 ML
BH CV ECHO MEAS - ESV(MOD-SP2): 33.5 ML
BH CV ECHO MEAS - ESV(MOD-SP4): 50.2 ML
BH CV ECHO MEAS - FS: 27.3 %
BH CV ECHO MEAS - IVS/LVPW: 0.9 CM
BH CV ECHO MEAS - IVSD: 0.9 CM
BH CV ECHO MEAS - LA DIMENSION: 4.5 CM
BH CV ECHO MEAS - LAT PEAK E' VEL: 9.5 CM/SEC
BH CV ECHO MEAS - LV MASS(C)D: 137.8 GRAMS
BH CV ECHO MEAS - LV MAX PG: 1.3 MMHG
BH CV ECHO MEAS - LV MEAN PG: 1 MMHG
BH CV ECHO MEAS - LV V1 MAX: 57.1 CM/SEC
BH CV ECHO MEAS - LV V1 VTI: 13.9 CM
BH CV ECHO MEAS - LVIDD: 4.4 CM
BH CV ECHO MEAS - LVIDS: 3.2 CM
BH CV ECHO MEAS - LVOT AREA: 3.1 CM2
BH CV ECHO MEAS - LVOT DIAM: 2 CM
BH CV ECHO MEAS - LVPWD: 1 CM
BH CV ECHO MEAS - MED PEAK E' VEL: 5.2 CM/SEC
BH CV ECHO MEAS - MV A MAX VEL: 73.4 CM/SEC
BH CV ECHO MEAS - MV DEC SLOPE: 256 CM/SEC2
BH CV ECHO MEAS - MV DEC TIME: 0.3 SEC
BH CV ECHO MEAS - MV E MAX VEL: 50.8 CM/SEC
BH CV ECHO MEAS - MV E/A: 0.69
BH CV ECHO MEAS - MV MAX PG: 4.2 MMHG
BH CV ECHO MEAS - MV MEAN PG: 1 MMHG
BH CV ECHO MEAS - MV P1/2T: 89.9 MSEC
BH CV ECHO MEAS - MV V2 VTI: 25.7 CM
BH CV ECHO MEAS - MVA(P1/2T): 2.45 CM2
BH CV ECHO MEAS - MVA(VTI): 1.7 CM2
BH CV ECHO MEAS - RAP SYSTOLE: 8 MMHG
BH CV ECHO MEAS - RVSP: 23 MMHG
BH CV ECHO MEAS - SV(LVOT): 43.7 ML
BH CV ECHO MEAS - SV(MOD-SP2): 44.7 ML
BH CV ECHO MEAS - SV(MOD-SP4): 61.8 ML
BH CV ECHO MEAS - TAPSE (>1.6): 1.92 CM
BH CV ECHO MEAS - TR MAX PG: 14.6 MMHG
BH CV ECHO MEAS - TR MAX VEL: 191 CM/SEC
BH CV ECHO MEASUREMENTS AVERAGE E/E' RATIO: 6.91
BH CV ECHO SHUNT ASSESSMENT PERFORMED (HIDDEN SCRIPTING): 1
BH CV XLRA - RV BASE: 3.4 CM
BH CV XLRA - RV LENGTH: 6.9 CM
BH CV XLRA - RV MID: 2.5 CM
BH CV XLRA - TDI S': 12.8 CM/SEC
BILIRUB SERPL-MCNC: 1 MG/DL (ref 0–1.2)
BUN SERPL-MCNC: 10 MG/DL (ref 8–23)
BUN/CREAT SERPL: 14.5 (ref 7–25)
CALCIUM SPEC-SCNC: 8.5 MG/DL (ref 8.6–10.5)
CHLORIDE SERPL-SCNC: 95 MMOL/L (ref 98–107)
CO2 SERPL-SCNC: 24 MMOL/L (ref 22–29)
CREAT SERPL-MCNC: 0.69 MG/DL (ref 0.76–1.27)
DEPRECATED RDW RBC AUTO: 42 FL (ref 37–54)
EGFRCR SERPLBLD CKD-EPI 2021: 94.7 ML/MIN/1.73
EOSINOPHIL # BLD AUTO: 0.04 10*3/MM3 (ref 0–0.4)
EOSINOPHIL NFR BLD AUTO: 0.8 % (ref 0.3–6.2)
ERYTHROCYTE [DISTWIDTH] IN BLOOD BY AUTOMATED COUNT: 11.3 % (ref 12.3–15.4)
GLOBULIN UR ELPH-MCNC: 2.1 GM/DL
GLUCOSE BLDC GLUCOMTR-MCNC: 119 MG/DL (ref 70–130)
GLUCOSE BLDC GLUCOMTR-MCNC: 136 MG/DL (ref 70–130)
GLUCOSE BLDC GLUCOMTR-MCNC: 141 MG/DL (ref 70–130)
GLUCOSE BLDC GLUCOMTR-MCNC: 159 MG/DL (ref 70–130)
GLUCOSE BLDC GLUCOMTR-MCNC: 205 MG/DL (ref 70–130)
GLUCOSE SERPL-MCNC: 153 MG/DL (ref 65–99)
HCT VFR BLD AUTO: 36.4 % (ref 37.5–51)
HGB BLD-MCNC: 13.3 G/DL (ref 13–17.7)
IMM GRANULOCYTES # BLD AUTO: 0.02 10*3/MM3 (ref 0–0.05)
IMM GRANULOCYTES NFR BLD AUTO: 0.4 % (ref 0–0.5)
LEFT ATRIUM VOLUME INDEX: 17.1 ML/M2
LYMPHOCYTES # BLD AUTO: 0.7 10*3/MM3 (ref 0.7–3.1)
LYMPHOCYTES NFR BLD AUTO: 14.2 % (ref 19.6–45.3)
MAGNESIUM SERPL-MCNC: 1.7 MG/DL (ref 1.6–2.4)
MCH RBC QN AUTO: 36.8 PG (ref 26.6–33)
MCHC RBC AUTO-ENTMCNC: 36.5 G/DL (ref 31.5–35.7)
MCV RBC AUTO: 100.8 FL (ref 79–97)
MONOCYTES # BLD AUTO: 0.77 10*3/MM3 (ref 0.1–0.9)
MONOCYTES NFR BLD AUTO: 15.6 % (ref 5–12)
NEUTROPHILS NFR BLD AUTO: 3.39 10*3/MM3 (ref 1.7–7)
NEUTROPHILS NFR BLD AUTO: 68.8 % (ref 42.7–76)
NRBC BLD AUTO-RTO: 0 /100 WBC (ref 0–0.2)
PA ADP PRP-ACNC: 133 PRU
PHOSPHATE SERPL-MCNC: 2.6 MG/DL (ref 2.5–4.5)
PLATELET # BLD AUTO: 106 10*3/MM3 (ref 140–450)
PMV BLD AUTO: 9.8 FL (ref 6–12)
POTASSIUM SERPL-SCNC: 3.5 MMOL/L (ref 3.5–5.2)
POTASSIUM SERPL-SCNC: 3.6 MMOL/L (ref 3.5–5.2)
PROT SERPL-MCNC: 6 G/DL (ref 6–8.5)
RBC # BLD AUTO: 3.61 10*6/MM3 (ref 4.14–5.8)
SODIUM SERPL-SCNC: 130 MMOL/L (ref 136–145)
WBC NRBC COR # BLD AUTO: 4.93 10*3/MM3 (ref 3.4–10.8)

## 2024-03-22 PROCEDURE — 25010000002 MIDAZOLAM PER 1 MG: Performed by: HOSPITALIST

## 2024-03-22 PROCEDURE — 25810000003 LACTATED RINGERS PER 1000 ML: Performed by: HOSPITALIST

## 2024-03-22 PROCEDURE — 84100 ASSAY OF PHOSPHORUS: CPT | Performed by: INTERNAL MEDICINE

## 2024-03-22 PROCEDURE — 25010000002 THIAMINE PER 100 MG: Performed by: PSYCHIATRY & NEUROLOGY

## 2024-03-22 PROCEDURE — 84132 ASSAY OF SERUM POTASSIUM: CPT | Performed by: INTERNAL MEDICINE

## 2024-03-22 PROCEDURE — 83735 ASSAY OF MAGNESIUM: CPT | Performed by: INTERNAL MEDICINE

## 2024-03-22 PROCEDURE — 25010000002 CYANOCOBALAMIN PER 1000 MCG: Performed by: INTERNAL MEDICINE

## 2024-03-22 PROCEDURE — 63710000001 INSULIN REGULAR HUMAN PER 5 UNITS: Performed by: HOSPITALIST

## 2024-03-22 PROCEDURE — 82948 REAGENT STRIP/BLOOD GLUCOSE: CPT

## 2024-03-22 PROCEDURE — 93306 TTE W/DOPPLER COMPLETE: CPT

## 2024-03-22 PROCEDURE — 99233 SBSQ HOSP IP/OBS HIGH 50: CPT | Performed by: PSYCHIATRY & NEUROLOGY

## 2024-03-22 PROCEDURE — 97112 NEUROMUSCULAR REEDUCATION: CPT

## 2024-03-22 PROCEDURE — 92610 EVALUATE SWALLOWING FUNCTION: CPT

## 2024-03-22 PROCEDURE — 93306 TTE W/DOPPLER COMPLETE: CPT | Performed by: INTERNAL MEDICINE

## 2024-03-22 PROCEDURE — 82140 ASSAY OF AMMONIA: CPT | Performed by: PSYCHIATRY & NEUROLOGY

## 2024-03-22 PROCEDURE — 99232 SBSQ HOSP IP/OBS MODERATE 35: CPT | Performed by: INTERNAL MEDICINE

## 2024-03-22 PROCEDURE — 97530 THERAPEUTIC ACTIVITIES: CPT

## 2024-03-22 PROCEDURE — 25010000002 MIDAZOLAM PER 1 MG: Performed by: PSYCHIATRY & NEUROLOGY

## 2024-03-22 PROCEDURE — 25010000002 THIAMINE PER 100 MG: Performed by: INTERNAL MEDICINE

## 2024-03-22 PROCEDURE — 97110 THERAPEUTIC EXERCISES: CPT

## 2024-03-22 PROCEDURE — 85576 BLOOD PLATELET AGGREGATION: CPT | Performed by: PSYCHIATRY & NEUROLOGY

## 2024-03-22 PROCEDURE — 85025 COMPLETE CBC W/AUTO DIFF WBC: CPT | Performed by: INTERNAL MEDICINE

## 2024-03-22 PROCEDURE — 25010000002 ZIPRASIDONE MESYLATE PER 10 MG: Performed by: INTERNAL MEDICINE

## 2024-03-22 PROCEDURE — 80053 COMPREHEN METABOLIC PANEL: CPT | Performed by: INTERNAL MEDICINE

## 2024-03-22 RX ORDER — MIDAZOLAM HYDROCHLORIDE 1 MG/ML
4 INJECTION INTRAMUSCULAR; INTRAVENOUS
Status: DISCONTINUED | OUTPATIENT
Start: 2024-03-22 | End: 2024-03-24

## 2024-03-22 RX ORDER — POTASSIUM CHLORIDE 1.5 G/1.58G
40 POWDER, FOR SOLUTION ORAL EVERY 4 HOURS
Status: COMPLETED | OUTPATIENT
Start: 2024-03-22 | End: 2024-03-22

## 2024-03-22 RX ORDER — OXAZEPAM 15 MG/1
15 CAPSULE ORAL EVERY 8 HOURS SCHEDULED
Status: DISCONTINUED | OUTPATIENT
Start: 2024-03-22 | End: 2024-03-26

## 2024-03-22 RX ORDER — POTASSIUM CHLORIDE 1.5 G/1.58G
40 POWDER, FOR SOLUTION ORAL EVERY 4 HOURS
Status: COMPLETED | OUTPATIENT
Start: 2024-03-22 | End: 2024-03-23

## 2024-03-22 RX ORDER — NICOTINE POLACRILEX 4 MG
15 LOZENGE BUCCAL
Status: DISCONTINUED | OUTPATIENT
Start: 2024-03-22 | End: 2024-03-27 | Stop reason: HOSPADM

## 2024-03-22 RX ORDER — DEXTROSE MONOHYDRATE 25 G/50ML
25 INJECTION, SOLUTION INTRAVENOUS
Status: DISCONTINUED | OUTPATIENT
Start: 2024-03-22 | End: 2024-03-27 | Stop reason: HOSPADM

## 2024-03-22 RX ORDER — MIDAZOLAM HYDROCHLORIDE 1 MG/ML
2 INJECTION INTRAMUSCULAR; INTRAVENOUS
Status: DISCONTINUED | OUTPATIENT
Start: 2024-03-22 | End: 2024-03-24

## 2024-03-22 RX ORDER — LORAZEPAM 1 MG/1
2 TABLET ORAL
Status: DISPENSED | OUTPATIENT
Start: 2024-03-22 | End: 2024-03-25

## 2024-03-22 RX ORDER — QUETIAPINE FUMARATE 25 MG/1
50 TABLET, FILM COATED ORAL EVERY 12 HOURS SCHEDULED
Status: DISCONTINUED | OUTPATIENT
Start: 2024-03-22 | End: 2024-03-23

## 2024-03-22 RX ORDER — LORAZEPAM 0.5 MG/1
0.5 TABLET ORAL
Status: DISPENSED | OUTPATIENT
Start: 2024-03-22 | End: 2024-03-25

## 2024-03-22 RX ORDER — INSULIN LISPRO 100 [IU]/ML
2-7 INJECTION, SOLUTION INTRAVENOUS; SUBCUTANEOUS
Status: DISCONTINUED | OUTPATIENT
Start: 2024-03-22 | End: 2024-03-27 | Stop reason: HOSPADM

## 2024-03-22 RX ADMIN — FOLIC ACID 1 MG: 5 INJECTION, SOLUTION INTRAMUSCULAR; INTRAVENOUS; SUBCUTANEOUS at 09:15

## 2024-03-22 RX ADMIN — PANTOPRAZOLE SODIUM 40 MG: 40 INJECTION, POWDER, FOR SOLUTION INTRAVENOUS at 21:44

## 2024-03-22 RX ADMIN — THIAMINE HYDROCHLORIDE 500 MG: 100 INJECTION, SOLUTION INTRAMUSCULAR; INTRAVENOUS at 15:39

## 2024-03-22 RX ADMIN — LORAZEPAM 1 MG: 1 TABLET ORAL at 03:44

## 2024-03-22 RX ADMIN — SENNOSIDES AND DOCUSATE SODIUM 2 TABLET: 8.6; 5 TABLET ORAL at 17:30

## 2024-03-22 RX ADMIN — ASPIRIN 81 MG: 81 TABLET, CHEWABLE ORAL at 09:16

## 2024-03-22 RX ADMIN — ZIPRASIDONE MESYLATE 10 MG: 20 INJECTION, POWDER, LYOPHILIZED, FOR SOLUTION INTRAMUSCULAR at 06:06

## 2024-03-22 RX ADMIN — INSULIN HUMAN 2 UNITS: 100 INJECTION, SOLUTION PARENTERAL at 17:30

## 2024-03-22 RX ADMIN — QUETIAPINE FUMARATE 50 MG: 25 TABLET ORAL at 21:45

## 2024-03-22 RX ADMIN — CEFDINIR 300 MG: 300 CAPSULE ORAL at 09:16

## 2024-03-22 RX ADMIN — POTASSIUM CHLORIDE 40 MEQ: 1.5 POWDER, FOR SOLUTION ORAL at 09:17

## 2024-03-22 RX ADMIN — THIAMINE HYDROCHLORIDE 500 MG: 100 INJECTION, SOLUTION INTRAMUSCULAR; INTRAVENOUS at 22:59

## 2024-03-22 RX ADMIN — THIAMINE HYDROCHLORIDE 500 MG: 100 INJECTION, SOLUTION INTRAMUSCULAR; INTRAVENOUS at 10:05

## 2024-03-22 RX ADMIN — POTASSIUM CHLORIDE 40 MEQ: 1.5 POWDER, FOR SOLUTION ORAL at 22:49

## 2024-03-22 RX ADMIN — FLUOXETINE 20 MG: 20 CAPSULE ORAL at 09:16

## 2024-03-22 RX ADMIN — SODIUM CHLORIDE, POTASSIUM CHLORIDE, SODIUM LACTATE AND CALCIUM CHLORIDE 100 ML/HR: 600; 310; 30; 20 INJECTION, SOLUTION INTRAVENOUS at 15:52

## 2024-03-22 RX ADMIN — NICOTINE 1 PATCH: 14 PATCH, EXTENDED RELEASE TRANSDERMAL at 17:29

## 2024-03-22 RX ADMIN — MIDAZOLAM HYDROCHLORIDE 4 MG: 1 INJECTION, SOLUTION INTRAMUSCULAR; INTRAVENOUS at 22:49

## 2024-03-22 RX ADMIN — ATORVASTATIN CALCIUM 80 MG: 40 TABLET, FILM COATED ORAL at 21:45

## 2024-03-22 RX ADMIN — CEFDINIR 300 MG: 300 CAPSULE ORAL at 21:45

## 2024-03-22 RX ADMIN — POTASSIUM CHLORIDE 40 MEQ: 1.5 POWDER, FOR SOLUTION ORAL at 11:57

## 2024-03-22 RX ADMIN — CYANOCOBALAMIN 1000 MCG: 1000 INJECTION, SOLUTION INTRAMUSCULAR; SUBCUTANEOUS at 09:16

## 2024-03-22 RX ADMIN — PANTOPRAZOLE SODIUM 40 MG: 40 INJECTION, POWDER, FOR SOLUTION INTRAVENOUS at 09:16

## 2024-03-22 RX ADMIN — OXAZEPAM 15 MG: 15 CAPSULE, GELATIN COATED ORAL at 21:45

## 2024-03-22 RX ADMIN — LOSARTAN POTASSIUM 25 MG: 25 TABLET, FILM COATED ORAL at 09:16

## 2024-03-22 RX ADMIN — Medication 10 ML: at 21:45

## 2024-03-22 RX ADMIN — MIDAZOLAM HYDROCHLORIDE 2 MG: 1 INJECTION, SOLUTION INTRAMUSCULAR; INTRAVENOUS at 01:30

## 2024-03-22 RX ADMIN — LORAZEPAM 1 MG: 1 TABLET ORAL at 09:16

## 2024-03-22 RX ADMIN — INSULIN HUMAN 3 UNITS: 100 INJECTION, SOLUTION PARENTERAL at 11:57

## 2024-03-22 NOTE — THERAPY TREATMENT NOTE
Patient Name: Patrice Howard  : 1945    MRN: [id number]                              Today's Date: 3/22/2024       Admit Date: 3/20/2024    Visit Dx:     ICD-10-CM ICD-9-CM   1. Altered mental status, unspecified altered mental status type  R41.82 780.97   2. Difficulty with speech  R47.9 784.59   3. Dysphagia, unspecified type  R13.10 787.20     Patient Active Problem List   Diagnosis    Aphasia    Alcohol use    History of prostate cancer    HTN (hypertension)    Fever    Acute metabolic encephalopathy     Past Medical History:   Diagnosis Date    Diabetes     Elevated PSA     Prostate cancer      History reviewed. No pertinent surgical history.   General Information       Row Name 24 1608          Physical Therapy Time and Intention    Document Type therapy note (daily note)  -CD     Mode of Treatment physical therapy  -CD       Row Name 24 1608          General Information    Patient Profile Reviewed yes  -CD     Existing Precautions/Restrictions fall  PT LETHARGIC, DECREASED PROCESSING, MONITOR ORTHOSTATICS.  -CD     Barriers to Rehab medically complex  -CD       Row Name 24 1608          Cognition    Orientation Status (Cognition) oriented to;person;verbal cues/prompts needed for orientation;place  -CD       Row Name 24 1608          Safety Issues, Functional Mobility    Safety Issues Affecting Function (Mobility) ability to follow commands;problem-solving;awareness of need for assistance;safety precaution awareness;safety precautions follow-through/compliance;insight into deficits/self-awareness;sequencing abilities;judgment  -CD     Impairments Affecting Function (Mobility) balance;cognition;coordination;endurance/activity tolerance;motor control;motor planning;postural/trunk control;strength;visual/perceptual  -CD     Cognitive Impairments, Mobility Safety/Performance awareness, need for assistance;insight into deficits/self-awareness;problem-solving/reasoning;safety  precaution awareness;safety precaution follow-through;sequencing abilities  -CD     Comment, Safety Issues/Impairments (Mobility) HAS HEAVY R LEAN AND RESISTS MOVEMENT TO THE L. LETHARGIC INITIALLY BUT MORE ALERT ONCE SITTING EOB. DECREASED PROCESSING.  -CD               User Key  (r) = Recorded By, (t) = Taken By, (c) = Cosigned By      Initials Name Provider Type    CD Antonieta Sousa, PT Physical Therapist                   Mobility       Row Name 03/22/24 1611          Bed Mobility    Bed Mobility rolling right;sidelying-sit  -CD     Supine-Sit [place] (Bed Mobility) maximum assist (25% patient effort)  -CD     Sidelying-Sit [place] (Bed Mobility) maximum assist (25% patient effort)  -CD     Assistive Device (Bed Mobility) bed rails;head of bed elevated;draw sheet  -CD     Comment, (Bed Mobility) MANUAL ASSIST TO UPRIGHT TRUNK AND MOVE LE'S OFF EOB.  -CD       Row Name 03/22/24 1611          Transfers    Comment, (Transfers) MECHANICAL LIFT BED TO CHAIR FOR SAFETY DUE TO POOR SITTING BALANCE AND HEAVY R LEAN WITH RESISTANCE TO MIDLINE.  -CD       Row Name 03/22/24 1611          Bed-Chair Transfer    Bed-Chair [place] (Transfers) dependent (less than 25% patient effort)  -CD     Assistive Device (Bed-Chair Transfers) lift device  -CD     Comment, (Bed-Chair Transfer) NSG IN TO ASSIST  -CD       Row Name 03/22/24 1611          Sit-Stand Transfer    Sit-Stand [place] (Transfers) not tested  -CD     Comment, (Sit-Stand Transfer) DEFERRED TO FOCUS ON STATIC SITTING BALANCE, COMMAND FOLLOWING AND ATTENTION TO THE L.  -CD       Row Name 03/22/24 1611          Gait/Stairs (Locomotion)    [place] Level (Gait) unable to assess  -CD     Comment, (Gait/Stairs) DEFERRED DUE TO WORSENING SITTING BALANCE.  -CD               User Key  (r) = Recorded By, (t) = Taken By, (c) = Cosigned By      Initials Name Provider Type    CD Antonieta Sousa, PT Physical Therapist                   Obj/Interventions        Row Name 03/22/24 1613          Motor Skills    Therapeutic Exercise --  COMPLETED SEATED LAQ X 10 REPS EACH. FOCUS ON BALANCE ACTIVITY SEATED AT EOB, ATTENDING TO L AND COMMAND FOLLOWING.  -CD       Row Name 03/22/24 1613          Balance    Static Sitting Balance dependent  PROGRESSED TO MAX ASSIST WITH MAX CUES AND POSITIONING.  -CD     Dynamic Sitting Balance dependent  -CD     Position, Sitting Balance unsupported;sitting edge of bed  -CD     Sit to Stand Dynamic Balance not tested  -CD     Comment, Balance DECLINE IN SITTING BALANCE NOTED TODAY. HEAVY R LEAN AND RESISTANT TO MOVEMENT TO MIDLINE/L. WORKED ON MIDLINE ORIENTATION.  -CD               User Key  (r) = Recorded By, (t) = Taken By, (c) = Cosigned By      Initials Name Provider Type    CD Antonieta Sousa, PT Physical Therapist                   Goals/Plan    No documentation.                  Clinical Impression       Row Name 03/22/24 1616          Pain Scale: FACES Pre/Post-Treatment    Pain: FACES Scale, Pretreatment 0-->no hurt  -CD     Posttreatment Pain Rating 0-->no hurt  -CD       Row Name 03/22/24 1616          Plan of Care Review    Plan of Care Reviewed With patient;son;daughter  -CD     Outcome Evaluation PT WITH NEW HEAVY R LEAN IN SITTING AND RESISTANCE TO MOVEMENT TOWARDS MIDLINE, LEFT. UANBLE TO MAINTAIN MIDLINE ORIENTATION DESPITE VERBAL/TACTILE CUES. CONTINUES WITH L SIDED WEAKNESS, INATTENTION TO THE L AND DECREASED PROCESSING. LETHARGIC INITIALLY BUT MORE ALERT AND INITIATING SOME CONVERSATION AT END OF SESSION. RECOMMEND IRF AT D/C.  -CD       Row Name 03/22/24 1616          Therapy Assessment/Plan (PT)    Rehab Potential (PT) good, to achieve stated therapy goals  -CD     Criteria for Skilled Interventions Met (PT) yes;meets criteria;skilled treatment is necessary  -CD     Therapy Frequency (PT) 2 times/day  -CD       Row Name 03/22/24 1616          Vital Signs    Pre Systolic BP Rehab 135  -CD     Pre Treatment Diastolic BP 80   -CD     Post Systolic BP Rehab 149  -CD     Post Treatment Diastolic BP 82  -CD     Posttreatment Heart Rate (beats/min) 71  -CD     Pre SpO2 (%) 96  -CD     O2 Delivery Pre Treatment room air  -CD     O2 Delivery Intra Treatment room air  -CD     Post SpO2 (%) 96  -CD     O2 Delivery Post Treatment room air  -CD     Pre Patient Position Supine  -CD     Intra Patient Position Standing  -CD     Post Patient Position Sitting  -CD       Row Name 03/22/24 1616          Positioning and Restraints    Pre-Treatment Position in bed  -CD     Post Treatment Position chair  -CD     In Chair exit alarm on;with family/caregiver;call light within reach;encouraged to call for assist;notified nsg;legs elevated;RUE elevated;LUE elevated;waffle cushion;on mechanical lift sling  PILLOW AT R TRUNK TO FACILITATE MIDLINE ORIENTATION.  -CD               User Key  (r) = Recorded By, (t) = Taken By, (c) = Cosigned By      Initials Name Provider Type    CD Antonieta Sousa, PT Physical Therapist                   Outcome Measures       Row Name 03/22/24 1621          How much help from another person do you currently need...    Turning from your back to your side while in flat bed without using bedrails? 2  -CD     Moving from lying on back to sitting on the side of a flat bed without bedrails? 2  -CD     Moving to and from a bed to a chair (including a wheelchair)? 1  -CD     Standing up from a chair using your arms (e.g., wheelchair, bedside chair)? 1  -CD     Climbing 3-5 steps with a railing? 1  -CD     To walk in hospital room? 1  -CD     AM-PAC 6 Clicks Score (PT) 8  -CD     Highest Level of Mobility Goal 3 --> Sit at edge of bed  -CD       Row Name 03/22/24 1621          Modified [place] Scale    Modified [place] Scale 4 - Moderately severe disability.  Unable to walk without assistance, and unable to attend to own bodily needs without assistance.  -CD       Row Name 03/22/24 1621          Functional Assessment    Outcome Measure Options  Modified [place];AM-PAC 6 Clicks Basic Mobility (PT)  -CD               User Key  (r) = Recorded By, (t) = Taken By, (c) = Cosigned By      Initials Name Provider Type    Antonieta Cancino PT Physical Therapist                                 Physical Therapy Education       Title: PT OT SLP Therapies (In Progress)       Topic: Physical Therapy (Done)       Point: Mobility training (Done)       Learning Progress Summary             Patient Acceptance, E, VU,NR by CD at 3/22/2024 1621    Comment: SEE FLOWSHEET    Acceptance, E, VU,NR by CD at 3/21/2024 1544    Comment: BENEFITS OF OOB ACTIVITY, SAFETY WITH MOBILITY, PROGRESSION OF POC, D/C PLANNING,   Family Acceptance, E, VU,NR by CD at 3/21/2024 1544    Comment: BENEFITS OF OOB ACTIVITY, SAFETY WITH MOBILITY, PROGRESSION OF POC, D/C PLANNING,                         Point: Home exercise program (Done)       Learning Progress Summary             Patient Acceptance, E, VU,NR by CD at 3/22/2024 1621    Comment: SEE FLOWSHEET    Acceptance, E, VU,NR by CD at 3/21/2024 1544    Comment: BENEFITS OF OOB ACTIVITY, SAFETY WITH MOBILITY, PROGRESSION OF POC, D/C PLANNING,   Family Acceptance, E, VU,NR by CD at 3/21/2024 1544    Comment: BENEFITS OF OOB ACTIVITY, SAFETY WITH MOBILITY, PROGRESSION OF POC, D/C PLANNING,                         Point: Body mechanics (Done)       Learning Progress Summary             Patient Acceptance, E, VU,NR by CD at 3/22/2024 1621    Comment: SEE FLOWSHEET    Acceptance, E, VU,NR by CD at 3/21/2024 1544    Comment: BENEFITS OF OOB ACTIVITY, SAFETY WITH MOBILITY, PROGRESSION OF POC, D/C PLANNING,   Family Acceptance, E, VU,NR by CD at 3/21/2024 1544    Comment: BENEFITS OF OOB ACTIVITY, SAFETY WITH MOBILITY, PROGRESSION OF POC, D/C PLANNING,                         Point: Precautions (Done)       Learning Progress Summary             Patient Acceptance, E, VU,NR by CD at 3/22/2024 1621    Comment: SEE FLOWSHEET    Acceptance, E, VU,NR by CD at  3/21/2024 1544    Comment: BENEFITS OF OOB ACTIVITY, SAFETY WITH MOBILITY, PROGRESSION OF POC, D/C PLANNING,   Family Acceptance, E, VU,NR by CD at 3/21/2024 1544    Comment: BENEFITS OF OOB ACTIVITY, SAFETY WITH MOBILITY, PROGRESSION OF POC, D/C PLANNING,                                         User Key       Initials Effective Dates Name Provider Type Discipline    CD 02/03/23 -  Antonieta Sosua, PT Physical Therapist PT                  PT Recommendation and Plan  Planned Therapy Interventions (PT): balance training, bed mobility training, gait training, patient/family education, strengthening, transfer training, postural re-education  Plan of Care Reviewed With: patient, son, daughter  Outcome Evaluation: PT WITH NEW HEAVY R LEAN IN SITTING AND RESISTANCE TO MOVEMENT TOWARDS MIDLINE, LEFT. UANBLE TO MAINTAIN MIDLINE ORIENTATION DESPITE VERBAL/TACTILE CUES. CONTINUES WITH L SIDED WEAKNESS, INATTENTION TO THE L AND DECREASED PROCESSING. LETHARGIC INITIALLY BUT MORE ALERT AND INITIATING SOME CONVERSATION AT END OF SESSION. RECOMMEND IRF AT D/C.     Time Calculation:   PT Evaluation Complexity  History, PT Evaluation Complexity: 3 or more personal factors and/or comorbidities  Examination of Body Systems (PT Eval Complexity): total of 4 or more elements  Clinical Presentation (PT Evaluation Complexity): evolving  Clinical Decision Making (PT Evaluation Complexity): moderate complexity  Overall Complexity (PT Evaluation Complexity): moderate complexity     PT Charges       Row Name 03/22/24 1622             Time Calculation    Start Time 1520  -CD      PT Received On 03/22/24  -CD         Time Calculation- PT    Total Timed Code Minutes- PT 40 minute(s)  -CD         Timed Charges    65926 - PT Therapeutic Exercise Minutes 8  -CD      45886 -  PT Neuromuscular Reeducation Minutes 20  -CD      44948 - PT Therapeutic Activity Minutes 12  -CD         Total Minutes    Timed Charges Total Minutes 40  -CD       Total Minutes  40  -CD                User Key  (r) = Recorded By, (t) = Taken By, (c) = Cosigned By      Initials Name Provider Type    CD Antonieta Sousa, PT Physical Therapist                  Therapy Charges for Today       Code Description Service Date Service Provider Modifiers Qty    76251306561 HC GAIT TRAINING EA 15 MIN 3/21/2024 Antonieta Sousa, PT GP 1    43603436080 HC PT EVAL MOD COMPLEXITY 4 3/21/2024 Antonieta Sousa, PT GP 1    11732676869 HC PT NEUROMUSC RE EDUCATION EA 15 MIN 3/22/2024 Antonieta Sousa, PT GP 1    73867728886 HC PT THER PROC EA 15 MIN 3/22/2024 Antonieta Sousa, PT GP 1    16332997205 HC PT THERAPEUTIC ACT EA 15 MIN 3/22/2024 Antonieta Sousa, PT GP 1            PT G-Codes  Outcome Measure Options: Modified [place], AM-PAC 6 Clicks Basic Mobility (PT)  AM-PAC 6 Clicks Score (PT): 8  AM-PAC 6 Clicks Score (OT): 15  Modified [place] Scale: 4 - Moderately severe disability.  Unable to walk without assistance, and unable to attend to own bodily needs without assistance.  PT Discharge Summary  Anticipated Discharge Disposition (PT): inpatient rehabilitation facility    Antonieta Sousa, AMARILIS  3/22/2024

## 2024-03-22 NOTE — PROGRESS NOTES
[facility] Medicine Services  PROGRESS NOTE    Patient Name: Patrice Howard  : 1945  MRN: [id number]    Date of Admission: 3/20/2024  Primary Care Physician: Delano Song DO    Subjective   Subjective     CC:  Follow-up for alcohol drawl    HPI:  Patient was seen and examined this morning.  Developed acute delirium at 2 different occasions overnight.  Improved with Geodon.  Currently sleepy lethargic.  Son at bedside and updated about the plan of care    Objective   Objective     Vital Signs:   Temp:  [97.4 °F (36.3 °C)-98.3 °F (36.8 °C)] 97.6 °F (36.4 °C)  Heart Rate:  [50-69] 59  Resp:  [18-19] 18  BP: (126-165)/(66-81) 165/81     Physical Exam:  General: Chronically ill and frail looking, not in distress, conversant and cooperative  Head: Atraumatic and normocephalic  Eyes: No Icterus. No pallor  Ears:  Ears appear intact with no abnormalities noted  Throat: No oral lesions, no thrush  Neck: Supple, trachea midline  Lungs: Clear to auscultation bilaterally, equal air entry, no wheezing or crackles  Heart:  Normal S1 and S2, no murmur, no gallop, No JVD, no lower extremity swelling  Abdomen:  Soft, no tenderness, no organomegaly, normal bowel sounds, no organomegaly  Extremities: pulses equal bilaterally  Skin: No bleeding, bruising or rash, normal skin turgor and elasticity  Neurologic: Cranial nerves appear intact with no evidence of facial asymmetry, normal motor and sensory functions in all 4 extremities  Psych: Alert and oriented x 3, normal mood    Results Reviewed:  LAB RESULTS:      Lab 24  0458 24  0700 24  1253 24  1249   WBC 4.93 7.09 8.14  --    HEMOGLOBIN 13.3 12.5* 14.1  --    HEMOGLOBIN, POC  --   --   --  14.6   HEMATOCRIT 36.4* 36.1* 40.1  --    HEMATOCRIT POC  --   --   --  43   PLATELETS 106* 118* 154  --    NEUTROS ABS 3.39 4.02 5.62  --    IMMATURE GRANS (ABS) 0.02 0.02 0.03  --    LYMPHS ABS 0.70 1.68 1.23  --    MONOS ABS  0.77 1.34* 1.22*  --    EOS ABS 0.04 0.01 0.01  --    .8* 108.4* 104.2*  --    APTT  --   --  28.0  --          Lab 03/22/24  0458 03/21/24  0700 03/20/24  1249   SODIUM 130* 132*  --    POTASSIUM 3.5 3.7  --    CHLORIDE 95* 98  --    CO2 24.0 22.0  --    ANION GAP 11.0 12.0  --    BUN 10 11  --    CREATININE 0.69* 0.81 0.90   EGFR 94.7 90.2 87.4   GLUCOSE 153* 96  --    CALCIUM 8.5* 8.3*  --    MAGNESIUM 1.7  --   --    PHOSPHORUS 2.6  --   --    HEMOGLOBIN A1C  --  5.30  --    TSH  --  0.733  --          Lab 03/22/24  0458 03/21/24  0700 03/20/24  1253   TOTAL PROTEIN 6.0 5.9*  --    ALBUMIN 3.9 3.9  --    GLOBULIN 2.1 2.0  --    ALT (SGPT) 15 17 27   AST (SGOT) 19 19 27   BILIRUBIN 1.0 1.0  --    ALK PHOS 80 64  --          Lab 03/20/24  1253   HSTROP T 11         Lab 03/21/24  0700   CHOLESTEROL 131   LDL CHOL 50   HDL CHOL 63*   TRIGLYCERIDES 95         Lab 03/20/24  2225   FOLATE 9.98   VITAMIN B 12 279         Brief Urine Lab Results  (Last result in the past 365 days)        Color   Clarity   Blood   Leuk Est   Nitrite   Protein   CREAT   Urine HCG        03/20/24 1345 Yellow   Clear   Small (1+)   Negative   Negative   Negative                   Microbiology Results Abnormal       Procedure Component Value - Date/Time    COVID PRE-OP / PRE-PROCEDURE SCREENING ORDER (NO ISOLATION) - Swab, Nasopharynx [[id number]]  (Normal) Collected: 03/20/24 1346    Lab Status: Final result Specimen: Swab from Nasopharynx Updated: 03/20/24 1446    Narrative:      The following orders were created for panel order COVID PRE-OP / PRE-PROCEDURE SCREENING ORDER (NO ISOLATION) - Swab, Nasopharynx.  Procedure                               Abnormality         Status                     ---------                               -----------         ------                     Respiratory Panel PCR w/...[[id number]]  Normal              Final result                 Please view results for these tests on the individual orders.     Respiratory Panel PCR w/COVID-19(SARS-CoV-2) [facility]ID/EMILY/PAD/COR/[facility]OFIA In-House, NP Swab in UTM/VTM, 2 HR TAT - Swab, Nasopharynx [[id number]]  (Normal) Collected: 03/20/24 1346    Lab Status: Final result Specimen: Swab from Nasopharynx Updated: 03/20/24 1446     ADENOVIRUS, PCR Not Detected     Coronavirus 229E Not Detected     Coronavirus HKU1 Not Detected     Coronavirus NL63 Not Detected     Coronavirus OC43 Not Detected     COVID19 Not Detected     Human Metapneumovirus Not Detected     Human Rhinovirus/Enterovirus Not Detected     Influenza A PCR Not Detected     Influenza B PCR Not Detected     Parainfluenza Virus 1 Not Detected     Parainfluenza Virus 2 Not Detected     Parainfluenza Virus 3 Not Detected     Parainfluenza Virus 4 Not Detected     RSV, PCR Not Detected     Bordetella pertussis pcr Not Detected     Bordetella parapertussis PCR Not Detected     Chlamydophila pneumoniae PCR Not Detected     Mycoplasma pneumo by PCR Not Detected    Narrative:      In the setting of a positive respiratory panel with a viral infection PLUS a negative procalcitonin without other underlying concern for bacterial infection, consider observing off antibiotics or discontinuation of antibiotics and continue supportive care. If the respiratory panel is positive for atypical bacterial infection (Bordetella pertussis, Chlamydophila pneumoniae, or Mycoplasma pneumoniae), consider antibiotic de-escalation to target atypical bacterial infection.            EEG    Result Date: 3/21/2024  Reason for referral: 78 y.o.male with altered mental status Technical Summary:  A 19 channel digital EEG was performed using the international 10-20 placement system, including eye leads and EKG leads. Duration: 21 minute Findings: The patient is resting quietly in a chair and appears asleep.  The background shows diffuse low amplitude theta activity with intermixed sleep spindles present.  IV pole artifact is variably present.  Toward the latter  half of the tracing, the patient rouses and is clearly awake.  There is an increase in faster alpha and theta frequencies over both hemispheres.  No lateralizing features are seen.  No epileptiform activity or electrographic seizures are present.  Hyperventilation and photic stimulation are not performed. Video: Available Technical quality: Superior EKG: Regular, 60 bpm SUMMARY: Normal EEG in the awake and asleep states No focal features or epileptiform activity are seen     Impression: Normal study This report is transcribed using the Dragon dictation system.      MRI Brain With & Without Contrast    Result Date: 3/21/2024  MRI BRAIN W WO CONTRAST Date of Exam: 3/21/2024 1:43 AM EDT Indication: Stroke, follow up.  Comparison: CT scan the head dated March 20, 2024 Technique:  Routine multiplanar/multisequence sequence images of the brain were obtained before and after the uneventful administration of 15 mL Multihance. Findings: There is mild diffuse atrophy. There are areas of increased T2 and FLAIR signal in the bilateral periventricular and subcortical white matter locations consistent with chronic microvascular ischemia. There is no mass, mass effect or midline shift. There are no abnormal extra-axial fluid collections or areas of acute hemorrhage. The major intracranial flow voids are preserved. The diffusion weighted sequences are normal. Contrast-enhanced images are somewhat degraded by the motion artifact. The thin section MP rage images are severely degraded. Axial T1 postcontrast image fails to demonstrate pathologic contrast enhancement.     Impression: Impression: Atrophy and chronic microvascular ischemia. No acute intracranial process. Electronically Signed: Demetris Lozano MD  3/21/2024 4:42 AM EDT  Workstation ID: [id number]    CT Head Without Contrast    Result Date: 3/20/2024  CT HEAD WO CONTRAST Date of Exam: 3/20/2024 8:08 PM EDT Indication: Neurologic decline Neurologic decline. Comparison: CT  performed on the same date Technique: Axial CT images were obtained of the head without contrast administration.  Automated exposure control and iterative construction methods were used. Findings: No intracranial hemorrhage. Gray-white matter differentiation is maintained without evidence of an acute infarction. Multiple foci of decreased attenuation are present within the subcortical, deep cerebral, and periventricular white matter consistent with chronic small vessel/microangiopathic ischemic changes. No extra-axial mass or collection. The ventricles and sulci are prominent commensurate with involutional changes. The posterior fossa appears grossly normal. Sellar and suprasellar structures are normal. Orbital and periorbital soft tissues are normal. The paranasal sinuses, ethmoid air cells, and mastoid air cells are aerated. The bony calvarium is intact.     Impression: Impression: No acute intracranial pathology. Electronically Signed: Cipriano Jones MD  3/20/2024 8:27 PM EDT  Workstation ID: [id number]    CT Angiogram Head w AI Analysis of LVO    Result Date: 3/20/2024  CT ANGIOGRAM HEAD W AI ANALYSIS OF LVO, CT CEREBRAL PERFUSION W WO CONTRAST, CT ANGIOGRAM NECK Date of Exam: 3/20/2024 1:02 PM EDT Indication: Neuro deficit, acute stroke suspected Neuro deficit, acute stroke suspected. Comparison: None available. Technique: CTA of the head was performed after the uneventful intravenous administration of . Reconstructed coronal and sagittal images were also obtained. In addition, a 3-D volume rendered image was created for interpretation. Automated exposure control and iterative reconstruction methods were used. FINDINGS: Vascular Findings: Atherosclerotic plaque within the right carotid bifurcation and right carotid siphon. The right common carotid, internal carotid, middle cerebral, anterior cerebral, vertebral, and posterior cerebral arteries are patent without abrupt cut off or aneurysmal dilation. There is  absence of the A1 segment of the right anterior cerebral artery. There is fetal origin of the right posterior cerebral artery. Atherosclerotic plaque within the left carotid bifurcation and left carotid siphon. The left common carotid, internal carotid, middle cerebral, anterior cerebral, vertebral, and posterior cerebral arteries are patent without abrupt cut off or aneurysmal dilation. Basilar artery appears patent and appears unremarkable. Non-vascular Findings: For description of nonvascular intracranial findings, please refer to the noncontrast head CT performed the same date. No acute abnormality is identified within the visualized soft tissue or bony structures of the neck. Cervical spondylosis is present. The visualized lung apices are clear. CT Perfusion: CBF (<30%) volume: 0 mL Tmax (>6.0s) volume: 3 mL Mismatch volume: 3 mL Mismatch ratio: Infinite     Impression: 1.No intracranial large vessel occlusion is identified. 2.No significant stenosis of the bilateral internal carotid arteries. 3.CT perfusion study demonstrates a tiny focus of prolonged Tmax greater than 6 seconds (3 mL) within the right occipital lobe. This could be artifactual. Tiny focus of ischemia cannot be excluded. Please correlate with MRI. Electronically Signed: Maurice Lam MD  3/20/2024 1:48 PM EDT  Workstation ID: [id number]    CT Angiogram Neck    Result Date: 3/20/2024  CT ANGIOGRAM HEAD W AI ANALYSIS OF LVO, CT CEREBRAL PERFUSION W WO CONTRAST, CT ANGIOGRAM NECK Date of Exam: 3/20/2024 1:02 PM EDT Indication: Neuro deficit, acute stroke suspected Neuro deficit, acute stroke suspected. Comparison: None available. Technique: CTA of the head was performed after the uneventful intravenous administration of . Reconstructed coronal and sagittal images were also obtained. In addition, a 3-D volume rendered image was created for interpretation. Automated exposure control and iterative reconstruction methods were used. FINDINGS: Vascular  Findings: Atherosclerotic plaque within the right carotid bifurcation and right carotid siphon. The right common carotid, internal carotid, middle cerebral, anterior cerebral, vertebral, and posterior cerebral arteries are patent without abrupt cut off or aneurysmal dilation. There is absence of the A1 segment of the right anterior cerebral artery. There is fetal origin of the right posterior cerebral artery. Atherosclerotic plaque within the left carotid bifurcation and left carotid siphon. The left common carotid, internal carotid, middle cerebral, anterior cerebral, vertebral, and posterior cerebral arteries are patent without abrupt cut off or aneurysmal dilation. Basilar artery appears patent and appears unremarkable. Non-vascular Findings: For description of nonvascular intracranial findings, please refer to the noncontrast head CT performed the same date. No acute abnormality is identified within the visualized soft tissue or bony structures of the neck. Cervical spondylosis is present. The visualized lung apices are clear. CT Perfusion: CBF (<30%) volume: 0 mL Tmax (>6.0s) volume: 3 mL Mismatch volume: 3 mL Mismatch ratio: Infinite     Impression: 1.No intracranial large vessel occlusion is identified. 2.No significant stenosis of the bilateral internal carotid arteries. 3.CT perfusion study demonstrates a tiny focus of prolonged Tmax greater than 6 seconds (3 mL) within the right occipital lobe. This could be artifactual. Tiny focus of ischemia cannot be excluded. Please correlate with MRI. Electronically Signed: Maurice Lam MD  3/20/2024 1:48 PM EDT  Workstation ID: [id number]    CT CEREBRAL PERFUSION WITH & WITHOUT CONTRAST    Result Date: 3/20/2024  CT ANGIOGRAM HEAD W AI ANALYSIS OF LVO, CT CEREBRAL PERFUSION W WO CONTRAST, CT ANGIOGRAM NECK Date of Exam: 3/20/2024 1:02 PM EDT Indication: Neuro deficit, acute stroke suspected Neuro deficit, acute stroke suspected. Comparison: None available.  Technique: CTA of the head was performed after the uneventful intravenous administration of . Reconstructed coronal and sagittal images were also obtained. In addition, a 3-D volume rendered image was created for interpretation. Automated exposure control and iterative reconstruction methods were used. FINDINGS: Vascular Findings: Atherosclerotic plaque within the right carotid bifurcation and right carotid siphon. The right common carotid, internal carotid, middle cerebral, anterior cerebral, vertebral, and posterior cerebral arteries are patent without abrupt cut off or aneurysmal dilation. There is absence of the A1 segment of the right anterior cerebral artery. There is fetal origin of the right posterior cerebral artery. Atherosclerotic plaque within the left carotid bifurcation and left carotid siphon. The left common carotid, internal carotid, middle cerebral, anterior cerebral, vertebral, and posterior cerebral arteries are patent without abrupt cut off or aneurysmal dilation. Basilar artery appears patent and appears unremarkable. Non-vascular Findings: For description of nonvascular intracranial findings, please refer to the noncontrast head CT performed the same date. No acute abnormality is identified within the visualized soft tissue or bony structures of the neck. Cervical spondylosis is present. The visualized lung apices are clear. CT Perfusion: CBF (<30%) volume: 0 mL Tmax (>6.0s) volume: 3 mL Mismatch volume: 3 mL Mismatch ratio: Infinite     Impression: 1.No intracranial large vessel occlusion is identified. 2.No significant stenosis of the bilateral internal carotid arteries. 3.CT perfusion study demonstrates a tiny focus of prolonged Tmax greater than 6 seconds (3 mL) within the right occipital lobe. This could be artifactual. Tiny focus of ischemia cannot be excluded. Please correlate with MRI. Electronically Signed: Maurice Lam MD  3/20/2024 1:48 PM EDT  Workstation ID: [id number]    XR  Chest 1 View    Result Date: 3/20/2024  XR CHEST 1 VW Date of Exam: 3/20/2024 1:18 PM EDT Indication: Acute Stroke Protocol (onset < 12 hrs) Comparison: CT chest September 12, 2023 Findings: The heart not definitely enlarged. The lungs seem clear. There are no pleural effusions. The visualized osseous structures do not appear unusual.     Impression: Impression: 1.An acute pulmonary process is not apparent. Electronically Signed: Roger Herring MD  3/20/2024 1:37 PM EDT  Workstation ID: [id number]    CT Head Without Contrast Stroke Protocol    Result Date: 3/20/2024  CT HEAD WO CONTRAST STROKE PROTOCOL Date of Exam: 3/20/2024 12:59 PM EDT Indication: Neuro deficit, acute, stroke suspected Neuro deficit, acute stroke suspected. Comparison: None available. Technique: Axial CT images were obtained of the head without contrast administration.  Reconstructed coronal images were also obtained. Automated exposure control and iterative construction methods were used. Scan Time: 1256 hours Results discussed with stroke team at 1259 hours. Findings: There is mild atrophy. There is mild periventricular hypodensity compatible with chronic small vessel ischemic insult. There is no acute mass effect or edema. There is calcification of the right vertebral artery at the skull base. There is no acute mass effect or edema. There are no findings suspicious for acute CVA or hemorrhage. There is moderate polypoid mucosal thickening of the inferior maxillary sinuses.     Impression: Impression: Chronic findings. No acute process. Electronically Signed: Earnestine Ramirez MD  3/20/2024 1:09 PM EDT  Workstation ID: [id number]         Current medications:  Scheduled Meds:aspirin, 81 mg, Oral, Daily   Or  aspirin, 300 mg, Rectal, Daily  atorvastatin, 80 mg, Oral, Nightly  cefdinir, 300 mg, Oral, Q12H  cyanocobalamin, 1,000 mcg, Intramuscular, Daily  FLUoxetine, 20 mg, Oral, Daily  folic acid 1 mg in sodium chloride 0.9 % 50 mL IVPB, 1 mg,  Intravenous, Daily  insulin regular, 2-7 Units, Subcutaneous, Q6H  LORazepam, 1 mg, Oral, Q6H  losartan, 25 mg, Oral, Daily  nicotine, 1 patch, Transdermal, Q24H  pantoprazole, 40 mg, Intravenous, Q12H  potassium chloride, 40 mEq, Oral, Q4H  sodium chloride, 10 mL, Intravenous, Q12H  sodium chloride, 10 mL, Intravenous, Q12H  thiamine (B-1) IV, 500 mg, Intravenous, Daily      Continuous Infusions:lactated ringers, 100 mL/hr, Last Rate: 100 mL/hr (03/21/24 2039)      PRN Meds:.  acetaminophen    acetaminophen    senna-docusate sodium **AND** polyethylene glycol **AND** bisacodyl **AND** bisacodyl    Calcium Replacement - Follow Nurse / BPA Driven Protocol    dextrose    dextrose    glucagon (human recombinant)    LORazepam **OR** midazolam **OR** LORazepam **OR** midazolam **OR** midazolam **OR** midazolam    Magnesium Standard Dose Replacement - Follow Nurse / BPA Driven Protocol    ondansetron ODT    Phosphorus Replacement - Follow Nurse / BPA Driven Protocol    Potassium Replacement - Follow Nurse / BPA Driven Protocol    prochlorperazine **OR** prochlorperazine **OR** prochlorperazine    sodium chloride    sodium chloride    sodium chloride    sodium chloride    ziprasidone    Assessment & Plan   Assessment & Plan     Active Hospital Problems    Diagnosis  POA    **Aphasia [R47.01]  Yes    Fever [R50.9]  Yes    Acute metabolic encephalopathy [G93.41]  Yes    Alcohol use [Z78.9]  Unknown    History of prostate cancer [Z85.46]  Not Applicable    HTN (hypertension) [I10]  Unknown      Resolved Hospital Problems   No resolved problems to display.        Brief Hospital Course to date:  Patrice Howard is a 78 y.o. male with history of type 2 diabetes, ongoing tobacco and alcohol use, history of prostate cancer status post resection, essential hypertension, who presented to the hospital with slurred speech and left-sided weakness    Acute metabolic encephalopathy, improving  Alcohol withdrawal  Drinks at least 3 double  shots of virgilio every night  Lives alone and independent with ADLs but feeling depressed and admits to using alcohol to treat his depression  Continue CIWA protocol  Add Serax 15mg 3 times daily and Seroquel 50 twice daily to help with delirium  Extensive discussion with the patient and his son at bedside regarding alcohol dependence and withdrawal  High-dose IV thiamine and start B12 supplement pending B12 level    Possible alcohol induced gastritis  Nausea and vomiting for a week with poor oral intake  IV Protonix twice daily    Possible UTI, POA  Has urinary symptoms with dysuria and urgency  Start p.o. cefdinir    Hypoosmolar hyponatremia  Continue IV fluids and monitor    TIA  Acute stroke, ruled out  Presented with left-sided weakness and slurred speech.  Stroke imaging studies including CT head and MRI brain negative  Continue aspirin and statins  Neurology team following    Vitamin B12 deficiency  B12 level is low at 270  Continue IM replacement    Essential pretension  Restart losartan    Type 2 diabetes  A1c 5.3%  SSI    Hx of prostate cancer  Status post resection  on Lupron     Depression  Start Prozac    Severe debility  PT and OT consultation  Might need rehab    Expected Discharge Location and Transportation: Might need rehab  Expected Discharge   Expected Discharge Date: 3/25/2024; Expected Discharge Time:      DVT prophylaxis:  Mechanical DVT prophylaxis orders are present.         AM-PAC 6 Clicks Score (PT): 15 (03/21/24 2000)    CODE STATUS:   Code Status and Medical Interventions:   Ordered at: 03/20/24 1429     Code Status (Patient has no pulse and is not breathing):    CPR (Attempt to Resuscitate)     Medical Interventions (Patient has pulse or is breathing):    Full Support     Copied text in this note has been reviewed and is accurate as of 03/22/24.     Fabiano Albarran MD  03/22/24

## 2024-03-22 NOTE — THERAPY EVALUATION
Acute Care - Speech Language Pathology   Swallow Re-Assessment [facility]  Clinical Swallow Evaluation       Patient Name: Patrice Howard  : 1945  MRN: [id number]  Today's Date: 3/22/2024               Admit Date: 3/20/2024    Visit Dx:     ICD-10-CM ICD-9-CM   1. Altered mental status, unspecified altered mental status type  R41.82 780.97   2. Difficulty with speech  R47.9 784.59   3. Dysphagia, unspecified type  R13.10 787.20     Patient Active Problem List   Diagnosis    Aphasia    Alcohol use    History of prostate cancer    HTN (hypertension)    Fever    Acute metabolic encephalopathy     Past Medical History:   Diagnosis Date    Diabetes     Elevated PSA     Prostate cancer      History reviewed. No pertinent surgical history.    SLP Recommendation and Plan  SLP Swallowing Diagnosis: suspected pharyngeal dysphagia (24)  SLP Diet Recommendation: puree, thin liquids, other (see comments) (no straw) (24)  Recommended Precautions and Strategies: general aspiration precautions, 1:1 supervision, assist with feeding, small bites of food and sips of liquid, other (see comments) (Feed only when fully awake/alert) (24)  SLP Rec. for Method of Medication Administration: meds whole, meds crushed, with puree, as tolerated (24)     Monitor for Signs of Aspiration: yes, notify SLP if any concerns (24)  Recommended Diagnostics: reassess via clinical swallow evaluation (24)  Swallow Criteria for Skilled Therapeutic Interventions Met: demonstrates skilled criteria (24)  Anticipated Discharge Disposition (SLP): unknown, anticipate therapy at next level of care (24)  Rehab Potential/Prognosis, Swallowing: good, to achieve stated therapy goals (24)     Predicted Duration Therapy Intervention (Days): until discharge (24)  Oral Care Recommendations: Oral Care BID/PRN, Suction toothbrush (24  1410)  Demonstrates Need for Referral to Another Service: speech therapy, other (see comments) (SLC eval pending pt status) (03/22/24 1410)                                            SWALLOW EVALUATION (Last 72 Hours)       SLP Adult Swallow Evaluation       Row Name 03/22/24 1410 03/21/24 0910                Rehab Evaluation    Document Type re-evaluation  - evaluation  -       Subjective Information no complaints  - no complaints  -       Patient Observations lethargic  - lethargic  -       Patient/Family/Caregiver Comments/Observations Family present  -MH Son present  -MH       Patient Effort fair  -MH fair  -MH       Symptoms Noted During/After Treatment none  -MH none  -          General Information    Patient Profile Reviewed yes  -MH yes  -       Pertinent History Of Current Problem See initial eval  - Adm w/ AMS. Hx: diabetes, tobacco use, daily alcohol use, prostate CA s/p resection w/ partial XRT, HTN. MRI negative for acute intracranial abnormalities  -       Current Method of Nutrition regular textures;thin liquids;other (see comments)  CSE completed 3/21 w/ recs for NPO, MD entered diet later in PM  - NPO  -       Precautions/Limitations, Vision difficult to assess;other (see comments)  Pt w/ eyes closed majority of eval  -MH difficult to assess;other (see comments)  pt w/ eyes closed majority of eval  -       Precautions/Limitations, Hearing difficult to assess  -MH difficult to assess  -       Prior Level of Function-Communication unknown  - unknown  -       Prior Level of Function-Swallowing no diet consistency restrictions;other (see comments)  per family report  - no diet consistency restrictions;other (see comments)  per family  -       Plans/Goals Discussed with patient;family;agreed upon  - patient;family;agreed upon  -       Barriers to Rehab none identified  -MH none identified  -       Patient's Goals for Discharge patient did not state  - patient  did not state  -       Family Goals for Discharge family did not state  - family did not state  -          Pain    Additional Documentation Pain Scale: FACES Pre/Post-Treatment (Group)  - Pain Scale: FACES Pre/Post-Treatment (Group)  -          Pain Scale: FACES Pre/Post-Treatment    Pain: FACES Scale, Pretreatment 0-->no hurt  - 0-->no hurt  -       Posttreatment Pain Rating 0-->no hurt  - 0-->no hurt  -          Oral Motor Structure and Function    Dentition Assessment natural, present and adequate  - natural, present and adequate  -       Secretion Management WNL/WFL  - WNL/WFL  -       Mucosal Quality moist, healthy  - moist, healthy  -          Oral Musculature and Cranial Nerve Assessment    Oral Motor General Assessment generalized oral motor weakness  - generalized oral motor weakness  -          General Eating/Swallowing Observations    Respiratory Support Currently in Use room air  - room air  -       Eating/Swallowing Skills self-fed;fed by SLP;needed assist  - fed by SLP;self-fed  -       Positioning During Eating upright in bed  - upright in bed  -       Utensils Used spoon;cup;straw  - spoon;cup;straw  -       Consistencies Trialed ice chips;thin liquids;pureed  - ice chips;thin liquids;pureed  -          Clinical Swallow Eval    Pharyngeal Phase suspected pharyngeal impairment  [facility] --  R/o pharyngeal dysphagia  -       Clinical Swallow Evaluation Summary Cont w/ lethargy, cues required t/o for pt to accept PO trials. Pt accepted minimal PO trials. Intermittent cough w/ thins via straw only. No s/s w/ thins via tsp/cup or pureed trials. Regular solid trials deferred 2' lethargy/cog status. Pt not appropriate for instrumental this date given poor acceptance of PO. Recommend pureed diet w/ thin liquids, no straw, 1:1 assistance, feed only when fully awake/alert. SLP will f/u for re-eval 3/23  - Pt lethargic, cues required t/o for pt to remain  awake/alert. Pt became agitated quickly, attempting to get out of bed; RN present/aware. Delayed cough x1 w/ initial thin liquid trial via tsp, u/a to replicate accross multiple additional trials. No s/s w/ thins via cup/straw; even when consecutive sips. No s/s w/ pureed. Given inability to remain awake/alert, u/a to safely recommend PO diet @ this time. Recommend cont NPO, essential meds ok crushed/whole in applesauce/pudding as tolerated. SLP will f/u for re-eval  -          Pharyngeal Phase Concerns    Pharyngeal Phase Concerns cough  - --       Cough thin;other (see comments)  via straw only  - --          SLP Evaluation Clinical Impression    SLP Swallowing Diagnosis suspected pharyngeal dysphagia  - R/O pharyngeal dysphagia  -       Functional Impact risk of aspiration/pneumonia  - risk of aspiration/pneumonia  -       Rehab Potential/Prognosis, Swallowing good, to achieve stated therapy goals  - good, to achieve stated therapy goals  -       Swallow Criteria for Skilled Therapeutic Interventions Met demonstrates skilled criteria  - demonstrates skilled criteria  -          Recommendations    Predicted Duration Therapy Intervention (Days) until discharge  - until discharge  -       SLP Diet Recommendation puree;thin liquids;other (see comments)  no straw  - NPO  -       Recommended Diagnostics reassess via clinical swallow evaluation  - reassess via clinical swallow evaluation  -       Recommended Precautions and Strategies general aspiration precautions;1:1 supervision;assist with feeding;small bites of food and sips of liquid;other (see comments)  Feed only when fully awake/alert  - general aspiration precautions  -       Oral Care Recommendations Oral Care BID/PRN;Suction toothbrush  - Oral Care BID/PRN;Suction toothbrush  -       SLP Rec. for Method of Medication Administration meds whole;meds crushed;with puree;as tolerated  - meds whole;meds crushed;with  puree;as tolerated  -       Monitor for Signs of Aspiration yes;notify SLP if any concerns  - yes;notify SLP if any concerns  -       Anticipated Discharge Disposition (SLP) unknown;anticipate therapy at next level of care  - unknown;anticipate therapy at next level of care  -       Demonstrates Need for Referral to Another Service speech therapy;other (see comments)  SLC eval pending pt status  - --                 User Key  (r) = Recorded By, (t) = Taken By, (c) = Cosigned By      Initials Name Effective Dates     Olga Casey MS CCC-SLP 05/12/23 -                     EDUCATION  The patient has been educated in the following areas:   Dysphagia (Swallowing Impairment) Modified Diet Instruction.              Time Calculation:    Time Calculation- SLP       Row Name 03/22/24 1445             Time Calculation- [facility]    SLP Start Time 1510  -      SLP Received On 03/22/24  -         Untimed Charges    55244-GY Eval Oral Pharyng Swallow Minutes 41  -         Total Minutes    Untimed Charges Total Minutes 41  -       Total Minutes 41  -                User Key  (r) = Recorded By, (t) = Taken By, (c) = Cosigned By      Initials Name Provider Type     Olga Casey MS CCC-SLP Speech and Language Pathologist                    Therapy Charges for Today       Code Description Service Date Service Provider Modifiers Qty    00434389697 HC ST EVAL ORAL PHARYNG SWALLOW 3 3/21/2024 Olga Casey MS CCC-SLP GN 1    [id number] HC ST EVAL ORAL PHARYNG SWALLOW 3 3/22/2024 Olga Casey MS CCC-SLP GN 1                 MS BAIRON Mora  3/22/2024

## 2024-03-22 NOTE — CASE MANAGEMENT/SOCIAL WORK
Continued Stay Note  [facility]     Patient Name: Patrice Howard  MRN: [id number]  Today's Date: 3/22/2024    Admit Date: 3/20/2024    Plan: Rehab, [facility] vs [facility] swing   Discharge Plan       Row Name 03/22/24 1240       Plan    Plan Rehab, [facility] vs [facility] swing    Plan Comments Patient sleeping very soundly. His son, Ghazala is at bedside.   Ghazala tells me his father lives alone in [place]. Is independent with ADL's, drives, no DME in use after reading the list. His insurance is [employer]. His PCP is HARPAL Wick He has advanced directives.  I reviewed rehab recommendations with Ghazala and he would like referral to [facility] and/or [facility] swing bed. I will need to discuss with patient about rehab plans.  I asked Shara with [facility] to complete her work up on Monday. Insurance will need to be approved for either place.    Final Discharge Disposition Code 62 - inpatient rehab facility                   Discharge Codes    No documentation.                 Expected Discharge Date and Time       Expected Discharge Date Expected Discharge Time    Mar 25, 2024               Ciara Disla RN

## 2024-03-22 NOTE — CONSULTS
Diabetes Education    Patient Name:  Patrice Howard  YOB: 1945  MRN: [id number]  Admit Date:  3/20/2024        Followed up today to speak with Mr. Howard about diabetes.  He was asleep today, and his son stated he has been agitated today worse than yesterday.  Explained that I was hoping to have him tell me a little about how he takes care of diabetes at home.  Explained to his son that we can be a resource for them once he gets home if he has any diabetes questions.  He was given our What is Diabetes handout.  I reviewed importance of keeping A1c less than 7%.  Explained that his A1c was well below that at 5.3%.  Explained that it could mean he has been having some low blood sugars at home.  He states that his father has reported being light headed from time to time.  Explained that if his light headedness is prior to meals or at a time when he has not eaten, it could very well be related to high blood sugar.  Suggested Mr. Howard may want to ask provider if he needs to be taking Metformin still with blood sugar running lower.  Encouraged them yesterday to look into updating his FSBS kit for a newer model as well.    They were given our contact information and encouraged to call with any future needs.  Spoke with nurse to let her know that we had spoken with Mr. Howard.  Thank you for this referral.       Electronically signed by:  Dolores Connor RN  03/22/24 12:41 EDT

## 2024-03-22 NOTE — PROGRESS NOTES
Neurology Note    Patient:  Patrice Howard    YOB: 1945    REFERRING PHYSICIAN:  Dr. Albarran    CHIEF COMPLAINT:    AMS    HISTORY OF PRESENT ILLNESS:   The patient was restless and agitated, trying to get up last night per son, given Ativan 1 mg IV at 0344 and 0916 this am, now drowsy. Serax ordered but on hold for now.    Past Medical History:  Past Medical History:   Diagnosis Date    Diabetes     Elevated PSA     Prostate cancer        Past Surgical History:  History reviewed. No pertinent surgical history.    Social History:   Social History     Socioeconomic History    Marital status: Single   Tobacco Use    Smoking status: Every Day     Current packs/day: 1.00     Average packs/day: 1 pack/day for 2.0 years (2.0 ttl pk-yrs)     Types: Cigarettes    Smokeless tobacco: Never   Vaping Use    Vaping status: Never Used   Substance and Sexual Activity    Alcohol use: Yes     Alcohol/week: 4.0 standard drinks of alcohol     Types: 2 Cans of beer, 2 Shots of liquor per week    Drug use: Never    Sexual activity: Defer        Family History:   History reviewed. No pertinent family history.    Medications Prior to Admission:    Prior to Admission medications    Medication Sig Start Date End Date Taking? Authorizing Provider   aspirin 81 MG chewable tablet Chew 1 tablet every day by oral route.   Yes Grazyna Car MD   Calcium Carbonate-Vit D-Min (Caltrate 600+D Plus Minerals) 600-800 MG-UNIT tablet Take 1 tablet by mouth Daily. 2/22/24  Yes Vasiliy Vega MD   lisinopril (PRINIVIL,ZESTRIL) 20 MG tablet Take 1 tablet by mouth Daily.   Yes Grazyna Car MD   losartan (COZAAR) 25 MG tablet Take 1 tablet by mouth Daily. 8/15/22  Yes Grazyna Car MD   metFORMIN (GLUCOPHAGE) 1000 MG tablet Take 1 tablet by mouth.   Yes Grazyna Car MD   triamcinolone (KENALOG) 0.025 % cream APPLY TOPICALLY TO THE AFFECTED AREA 2 TO 3 TIMES DAILY 6/15/22  Yes Grazyna Car MD    sildenafil (Viagra) 100 MG tablet Take 1 tablet by mouth As Needed for Erectile Dysfunction (1 hr prior prior to intercourse w empty stomach). 2/8/24   Vasiliy Vega MD       Allergies:  Patient has no known allergies.      Review of system  Review of Systems   Unable to perform ROS: Mental status change       Vitals:    03/22/24 1131   BP: 155/89   Pulse: 69   Resp: 18   Temp: 99 °F (37.2 °C)   SpO2: 99%       Physical exam  Physical Exam  Eyes:      Pupils: Pupils are equal, round, and reactive to light.   Cardiovascular:      Rate and Rhythm: Normal rate and regular rhythm.      Pulses: Normal pulses.   Neurological:      General: No focal deficit present.      Deep Tendon Reflexes: Babinski sign absent on the right side. Babinski sign absent on the left side.      Comments: Eyes closed, hard to arouse, eventually opens eyes, mumbles, oriented to self only, moves limbs on command some.           Lab Results   Component Value Date    WBC 4.93 03/22/2024    HGB 13.3 03/22/2024    HCT 36.4 (L) 03/22/2024    .8 (H) 03/22/2024     (L) 03/22/2024     Lab Results   Component Value Date    GLUCOSE 153 (H) 03/22/2024    BUN 10 03/22/2024    CREATININE 0.69 (L) 03/22/2024    BCR 14.5 03/22/2024    CO2 24.0 03/22/2024    CALCIUM 8.5 (L) 03/22/2024    ALBUMIN 3.9 03/22/2024    AST 19 03/22/2024    ALT 15 03/22/2024     P2Y12 Reactivity Unit       Ammonia  16 - 60 umol/L 13 Low      Radiological Studies:   EEG    Result Date: 3/21/2024  Reason for referral: 78 y.o.male with altered mental status Technical Summary:  A 19 channel digital EEG was performed using the international 10-20 placement system, including eye leads and EKG leads. Duration: 21 minute Findings: The patient is resting quietly in a chair and appears asleep.  The background shows diffuse low amplitude theta activity with intermixed sleep spindles present.  IV pole artifact is variably present.  Toward the latter half of the  tracing, the patient rouses and is clearly awake.  There is an increase in faster alpha and theta frequencies over both hemispheres.  No lateralizing features are seen.  No epileptiform activity or electrographic seizures are present.  Hyperventilation and photic stimulation are not performed. Video: Available Technical quality: Superior EKG: Regular, 60 bpm SUMMARY: Normal EEG in the awake and asleep states No focal features or epileptiform activity are seen     Normal study This report is transcribed using the Dragon dictation system.      MRI Brain With & Without Contrast    Result Date: 3/21/2024  MRI BRAIN W WO CONTRAST Date of Exam: 3/21/2024 1:43 AM EDT Indication: Stroke, follow up.  Comparison: CT scan the head dated March 20, 2024 Technique:  Routine multiplanar/multisequence sequence images of the brain were obtained before and after the uneventful administration of 15 mL Multihance. Findings: There is mild diffuse atrophy. There are areas of increased T2 and FLAIR signal in the bilateral periventricular and subcortical white matter locations consistent with chronic microvascular ischemia. There is no mass, mass effect or midline shift. There are no abnormal extra-axial fluid collections or areas of acute hemorrhage. The major intracranial flow voids are preserved. The diffusion weighted sequences are normal. Contrast-enhanced images are somewhat degraded by the motion artifact. The thin section MP rage images are severely degraded. Axial T1 postcontrast image fails to demonstrate pathologic contrast enhancement.     Impression: Atrophy and chronic microvascular ischemia. No acute intracranial process. Electronically Signed: Demetris Lozano MD  3/21/2024 4:42 AM EDT  Workstation ID: [id number]    CT Head Without Contrast    Result Date: 3/20/2024  CT HEAD WO CONTRAST Date of Exam: 3/20/2024 8:08 PM EDT Indication: Neurologic decline Neurologic decline. Comparison: CT performed on the same date Technique: Axial  CT images were obtained of the head without contrast administration.  Automated exposure control and iterative construction methods were used. Findings: No intracranial hemorrhage. Gray-white matter differentiation is maintained without evidence of an acute infarction. Multiple foci of decreased attenuation are present within the subcortical, deep cerebral, and periventricular white matter consistent with chronic small vessel/microangiopathic ischemic changes. No extra-axial mass or collection. The ventricles and sulci are prominent commensurate with involutional changes. The posterior fossa appears grossly normal. Sellar and suprasellar structures are normal. Orbital and periorbital soft tissues are normal. The paranasal sinuses, ethmoid air cells, and mastoid air cells are aerated. The bony calvarium is intact.     Impression: No acute intracranial pathology. Electronically Signed: Cipriano Jones MD  3/20/2024 8:27 PM EDT  Workstation ID: [id number]    CT Angiogram Head w AI Analysis of LVO    Result Date: 3/20/2024  CT ANGIOGRAM HEAD W AI ANALYSIS OF LVO, CT CEREBRAL PERFUSION W WO CONTRAST, CT ANGIOGRAM NECK Date of Exam: 3/20/2024 1:02 PM EDT Indication: Neuro deficit, acute stroke suspected Neuro deficit, acute stroke suspected. Comparison: None available. Technique: CTA of the head was performed after the uneventful intravenous administration of . Reconstructed coronal and sagittal images were also obtained. In addition, a 3-D volume rendered image was created for interpretation. Automated exposure control and iterative reconstruction methods were used. FINDINGS: Vascular Findings: Atherosclerotic plaque within the right carotid bifurcation and right carotid siphon. The right common carotid, internal carotid, middle cerebral, anterior cerebral, vertebral, and posterior cerebral arteries are patent without abrupt cut off or aneurysmal dilation. There is absence of the A1 segment of the right anterior cerebral  artery. There is fetal origin of the right posterior cerebral artery. Atherosclerotic plaque within the left carotid bifurcation and left carotid siphon. The left common carotid, internal carotid, middle cerebral, anterior cerebral, vertebral, and posterior cerebral arteries are patent without abrupt cut off or aneurysmal dilation. Basilar artery appears patent and appears unremarkable. Non-vascular Findings: For description of nonvascular intracranial findings, please refer to the noncontrast head CT performed the same date. No acute abnormality is identified within the visualized soft tissue or bony structures of the neck. Cervical spondylosis is present. The visualized lung apices are clear. CT Perfusion: CBF (<30%) volume: 0 mL Tmax (>6.0s) volume: 3 mL Mismatch volume: 3 mL Mismatch ratio: Infinite     1.No intracranial large vessel occlusion is identified. 2.No significant stenosis of the bilateral internal carotid arteries. 3.CT perfusion study demonstrates a tiny focus of prolonged Tmax greater than 6 seconds (3 mL) within the right occipital lobe. This could be artifactual. Tiny focus of ischemia cannot be excluded. Please correlate with MRI. Electronically Signed: Maurice Lam MD  3/20/2024 1:48 PM EDT  Workstation ID: [id number]    CT Angiogram Neck    Result Date: 3/20/2024  CT ANGIOGRAM HEAD W AI ANALYSIS OF LVO, CT CEREBRAL PERFUSION W WO CONTRAST, CT ANGIOGRAM NECK Date of Exam: 3/20/2024 1:02 PM EDT Indication: Neuro deficit, acute stroke suspected Neuro deficit, acute stroke suspected. Comparison: None available. Technique: CTA of the head was performed after the uneventful intravenous administration of . Reconstructed coronal and sagittal images were also obtained. In addition, a 3-D volume rendered image was created for interpretation. Automated exposure control and iterative reconstruction methods were used. FINDINGS: Vascular Findings: Atherosclerotic plaque within the right carotid bifurcation  and right carotid siphon. The right common carotid, internal carotid, middle cerebral, anterior cerebral, vertebral, and posterior cerebral arteries are patent without abrupt cut off or aneurysmal dilation. There is absence of the A1 segment of the right anterior cerebral artery. There is fetal origin of the right posterior cerebral artery. Atherosclerotic plaque within the left carotid bifurcation and left carotid siphon. The left common carotid, internal carotid, middle cerebral, anterior cerebral, vertebral, and posterior cerebral arteries are patent without abrupt cut off or aneurysmal dilation. Basilar artery appears patent and appears unremarkable. Non-vascular Findings: For description of nonvascular intracranial findings, please refer to the noncontrast head CT performed the same date. No acute abnormality is identified within the visualized soft tissue or bony structures of the neck. Cervical spondylosis is present. The visualized lung apices are clear. CT Perfusion: CBF (<30%) volume: 0 mL Tmax (>6.0s) volume: 3 mL Mismatch volume: 3 mL Mismatch ratio: Infinite     1.No intracranial large vessel occlusion is identified. 2.No significant stenosis of the bilateral internal carotid arteries. 3.CT perfusion study demonstrates a tiny focus of prolonged Tmax greater than 6 seconds (3 mL) within the right occipital lobe. This could be artifactual. Tiny focus of ischemia cannot be excluded. Please correlate with MRI. Electronically Signed: Maurice Lam MD  3/20/2024 1:48 PM EDT  Workstation ID: [id number]    CT CEREBRAL PERFUSION WITH & WITHOUT CONTRAST    Result Date: 3/20/2024  CT ANGIOGRAM HEAD W AI ANALYSIS OF LVO, CT CEREBRAL PERFUSION W WO CONTRAST, CT ANGIOGRAM NECK Date of Exam: 3/20/2024 1:02 PM EDT Indication: Neuro deficit, acute stroke suspected Neuro deficit, acute stroke suspected. Comparison: None available. Technique: CTA of the head was performed after the uneventful intravenous administration of  . Reconstructed coronal and sagittal images were also obtained. In addition, a 3-D volume rendered image was created for interpretation. Automated exposure control and iterative reconstruction methods were used. FINDINGS: Vascular Findings: Atherosclerotic plaque within the right carotid bifurcation and right carotid siphon. The right common carotid, internal carotid, middle cerebral, anterior cerebral, vertebral, and posterior cerebral arteries are patent without abrupt cut off or aneurysmal dilation. There is absence of the A1 segment of the right anterior cerebral artery. There is fetal origin of the right posterior cerebral artery. Atherosclerotic plaque within the left carotid bifurcation and left carotid siphon. The left common carotid, internal carotid, middle cerebral, anterior cerebral, vertebral, and posterior cerebral arteries are patent without abrupt cut off or aneurysmal dilation. Basilar artery appears patent and appears unremarkable. Non-vascular Findings: For description of nonvascular intracranial findings, please refer to the noncontrast head CT performed the same date. No acute abnormality is identified within the visualized soft tissue or bony structures of the neck. Cervical spondylosis is present. The visualized lung apices are clear. CT Perfusion: CBF (<30%) volume: 0 mL Tmax (>6.0s) volume: 3 mL Mismatch volume: 3 mL Mismatch ratio: Infinite     1.No intracranial large vessel occlusion is identified. 2.No significant stenosis of the bilateral internal carotid arteries. 3.CT perfusion study demonstrates a tiny focus of prolonged Tmax greater than 6 seconds (3 mL) within the right occipital lobe. This could be artifactual. Tiny focus of ischemia cannot be excluded. Please correlate with MRI. Electronically Signed: Maurice Lam MD  3/20/2024 1:48 PM EDT  Workstation ID: [id number]    XR Chest 1 View    Result Date: 3/20/2024  XR CHEST 1 VW Date of Exam: 3/20/2024 1:18 PM EDT Indication: Acute  Stroke Protocol (onset < 12 hrs) Comparison: CT chest September 12, 2023 Findings: The heart not definitely enlarged. The lungs seem clear. There are no pleural effusions. The visualized osseous structures do not appear unusual.     Impression: 1.An acute pulmonary process is not apparent. Electronically Signed: Roger Herring MD  3/20/2024 1:37 PM EDT  Workstation ID: [id number]    CT Head Without Contrast Stroke Protocol    Result Date: 3/20/2024  CT HEAD WO CONTRAST STROKE PROTOCOL Date of Exam: 3/20/2024 12:59 PM EDT Indication: Neuro deficit, acute, stroke suspected Neuro deficit, acute stroke suspected. Comparison: None available. Technique: Axial CT images were obtained of the head without contrast administration.  Reconstructed coronal images were also obtained. Automated exposure control and iterative construction methods were used. Scan Time: 1256 hours Results discussed with stroke team at 1259 hours. Findings: There is mild atrophy. There is mild periventricular hypodensity compatible with chronic small vessel ischemic insult. There is no acute mass effect or edema. There is calcification of the right vertebral artery at the skull base. There is no acute mass effect or edema. There are no findings suspicious for acute CVA or hemorrhage. There is moderate polypoid mucosal thickening of the inferior maxillary sinuses.     Impression: Chronic findings. No acute process. Electronically Signed: Earnestine Ramirez MD  3/20/2024 1:09 PM EDT  Workstation ID: [id number]       During this visit the following were done:  Labs Reviewed [x]    Labs Ordered []    Radiology Reports Reviewed [x]    Radiology Ordered []    EKG, echo, and/or stress test reviewed []    EEG results reviewed  [x]    EEG reviewed and interpreted per myself   []    Discussed case with neurointerventionalist or neuroradiologist []    Referring Provider Records Reviewed []    ER Records Reviewed []    Hospital Records Reviewed []    History Obtained From  Family [x]    Radiological images view and Interpreted per myself [x]    Case Discussed with referring provider []     Decision to obtain and request outside records  []        Assessment and Plan     AMS, nighttime agitation, currently oversedated. Possible early alcohol withdrawal, less likely Wernicke's. HA, AMS and stroke-like symptoms in the setting of low-grade fever, suspect a viral syndrome. MRI brain negative for acute changes, personally reviewed. Favor TME and migraine in the setting of a viral syndrome. He does not have nuchal rigidity to suggest meningitis, MRI brain is not suggestive of encephalitis. Clinically, he is improving. Question of a small right occipital stroke on CTP as well, MRI brain and CTA negative.   - Observation on telemetry.   - Continue current meds except for Plavix which I stopped upon consideration on an LP.   - Decrease prn Ativan dose.   - Promote sleep hygiene, encouraged his son to stimulate him during the day.   - LP if symptoms persists.               Electronically signed by Kev Rushing MD on 3/22/2024 at 12:16 EDT

## 2024-03-22 NOTE — PROGRESS NOTES
Clinical Nutrition   Nutrition Assessment  Reason for Visit: Follow-up protocol    Patient Name: Patrice Howard  YOB: 1945  MRN: [id number]  Date of Encounter: 03/22/24 13:39 EDT  Admission date: 3/20/2024    Comments:    Patient was resting at time of visit again. Spoke w/ family at bedside who reported patient ate well at dinner last night, however, he hasn't been awake enough to eat breakfast this AM. Was able to obtain permission from family to perform NFPE. Patient with significant weight loss noted. May need to add ONS if unable to meet needs with current intake. RDN following as able.    Nutrition Assessment   Admission Diagnosis:  Aphasia [R47.01]  Acute metabolic encephalopathy [G93.41]    Problem List:    Aphasia    Alcohol use    History of prostate cancer    HTN (hypertension)    Fever    Acute metabolic encephalopathy      PMH:   He  has a past medical history of Diabetes, Elevated PSA, and Prostate cancer.    PSH:  He  has no past surgical history on file.    Applicable Nutrition Concerns:   Skin:  Oral:  GI:    Applicable Interval History:   3/21: SLP eval: NPO    Reported/Observed/Food/Nutrition Related History:     3/22  Patient was resting at time of visit again. Spoke w/ family at bedside who reported patient ate well at dinner last night, however, he hasn't been awake enough to eat breakfast this AM. Was able to obtain permission from family to perform NFPE. Patient with significant weight loss noted. May need to add ONS if unable to meet needs with current intake.     3/21  Patient resting at time of visit. Spoke w/ family at bedside. Family was unsure of patient's energy intake PTA. Stated patient lives alone and in different city than family. However, reported that when he has been around the patient eating, he hasn't noticed any significant changes. Stated he has noted that patient has lost some weight over the past several years, but is unsure of how much or  "why. SLP eval today recs for NPO. Provider ordered regular diet for patient. No nutrition POC at this time, however, RDN will follow up as able with patient to obtain an accurate history as significant weight loss is noted in EMR.    Anthropometrics     Flowsheet Rows      Flowsheet Row First Filed Value   Admission Height 170.2 cm (67\") Documented at 03/20/2024 1238   Admission Weight 61.2 kg (135 lb) Documented at 03/20/2024 1238            Height: Height: 170.2 cm (67\")  Last Filed Weight: Weight: 61.2 kg (135 lb) (03/20/24 1238)  Method:    BMI: BMI (Calculated): 21.1  CBW: 135#  2/8/24: 142#  5% significant weight loss noted in 1 month    Nutrition Focused Physical Exam     Date:  3/22       Patient meets criteria for malnutrition diagnosis, see MSA note.     Current Nutrition Prescription   PO: Diet: Regular/House; Texture: Regular (IDDSI 7); Fluid Consistency: Thin (IDDSI 0)  Oral Nutrition Supplement: n/a  Intake: Insufficient data    Nutrition Diagnosis   Date:  3/21            Updated:    Problem No nutrition diagnosis at this time   Etiology    Signs/Symptoms    Status:     Date:  3/22 Updated:  Problem Malnutrition    Etiology Chronic condition   Signs/Symptoms Muscle/fat wasting, significant weight loss   Status: new    Goal:   General: Nutrition to support treatment  PO: Establish PO, Meet estimated needs  EN/PN: N/A    Nutrition Intervention      Follow treatment progress, Care plan reviewed, Interview for preferences, Await begin PO, Encourage intake    Monitoring/Evaluation:   Per protocol, PO intake, Symptoms, POC/GOC, Swallow function, Hemodynamic stability    Emilia Bowers RD  Time Spent: 25min  "

## 2024-03-22 NOTE — PLAN OF CARE
Goal Outcome Evaluation:                     Anticipated Discharge Disposition (SLP): unknown, anticipate therapy at next level of care          SLP Swallowing Diagnosis: suspected pharyngeal dysphagia (03/22/24 8879)

## 2024-03-22 NOTE — PLAN OF CARE
Goal Outcome Evaluation:  Plan of Care Reviewed With: patient, son, daughter           Outcome Evaluation: PT WITH NEW HEAVY R LEAN IN SITTING AND RESISTANCE TO MOVEMENT TOWARDS MIDLINE, LEFT. UANBLE TO MAINTAIN MIDLINE ORIENTATION DESPITE VERBAL/TACTILE CUES. CONTINUES WITH L SIDED WEAKNESS, INATTENTION TO THE L AND DECREASED PROCESSING. LETHARGIC INITIALLY BUT MORE ALERT AND INITIATING SOME CONVERSATION AT END OF SESSION. RECOMMEND IRF AT D/C.      Anticipated Discharge Disposition (PT): inpatient rehabilitation facility

## 2024-03-22 NOTE — PROGRESS NOTES
Malnutrition Severity Assessment    Patient Name:  Patrice Howard  YOB: 1945  MRN: [id number]  Admit Date:  3/20/2024    Patient meets criteria for : Moderate (non-severe) Malnutrition    Comments:  Patient meets chronic disease related moderate malnutrition with indicators for significant weight loss and moderate muscle/fat loss.    Malnutrition Severity Assessment  Malnutrition Type: Chronic Disease - Related Malnutrition  Malnutrition Type (Last 8 Hours)       Malnutrition Severity Assessment       Row Name 03/22/24 1335       Malnutrition Severity Assessment    Malnutrition Type Chronic Disease - Related Malnutrition      Row Name 03/22/24 1335       Unintentional Weight Loss     Unintentional Weight Loss Findings Moderate    Unintentional Weight Loss  Weight loss of 5% in one month      Row Name 03/22/24 1335       Muscle Loss    Loss of Muscle Mass Findings Moderate    [place] Moderate - slight depression    Clavicle Bone Region Moderate - some protrusion in females, visible in males    Acromion Bone Region Moderate - acromion may slightly protrude    Dorsal Hand Region Moderate - slight depression    Patellar Region Moderate - patella more prominent, less muscle definition around patella    Anterior Thigh Region Moderate - mild depression on inner thigh    Posterior Calf Region Moderate - some roundness, slight firmness      Row Name 03/22/24 1335       Fat Loss    Subcutaneous Fat Loss Findings Moderate    Orbital Region  Moderate -  somewhat hollowness, slightly dark circles    Upper Arm Region Moderate - some fat tissue, not ample      Row Name 03/22/24 1335       Declining Functional Status    Declining Functional Status Findings N/A      Row Name 03/22/24 1335       Criteria Met (Must meet criteria for severity in at least 2 of these categories: M Wasting, Fat Loss, Fluid, Secondary Signs, Wt. Status, Intake)    Patient meets criteria for  Moderate (non-severe) Malnutrition                     Electronically signed by:  Emilia Bowers RD  03/22/24 13:38 EDT

## 2024-03-23 ENCOUNTER — APPOINTMENT (OUTPATIENT)
Dept: MRI IMAGING | Facility: HOSPITAL | Age: 79
End: 2024-03-23
Payer: MEDICARE

## 2024-03-23 LAB
ALBUMIN SERPL-MCNC: 3.5 G/DL (ref 3.5–5.2)
ALBUMIN/GLOB SERPL: 1.7 G/DL
ALP SERPL-CCNC: 70 U/L (ref 39–117)
ALT SERPL W P-5'-P-CCNC: 15 U/L (ref 1–41)
ANION GAP SERPL CALCULATED.3IONS-SCNC: 10 MMOL/L (ref 5–15)
AST SERPL-CCNC: 21 U/L (ref 1–40)
BASOPHILS # BLD AUTO: 0.02 10*3/MM3 (ref 0–0.2)
BASOPHILS NFR BLD AUTO: 0.4 % (ref 0–1.5)
BILIRUB SERPL-MCNC: 0.7 MG/DL (ref 0–1.2)
BUN SERPL-MCNC: 6 MG/DL (ref 8–23)
BUN/CREAT SERPL: 9.1 (ref 7–25)
CALCIUM SPEC-SCNC: 8.9 MG/DL (ref 8.6–10.5)
CHLORIDE SERPL-SCNC: 102 MMOL/L (ref 98–107)
CO2 SERPL-SCNC: 23 MMOL/L (ref 22–29)
CREAT SERPL-MCNC: 0.66 MG/DL (ref 0.76–1.27)
DEPRECATED RDW RBC AUTO: 45 FL (ref 37–54)
EGFRCR SERPLBLD CKD-EPI 2021: 96 ML/MIN/1.73
EOSINOPHIL # BLD AUTO: 0.22 10*3/MM3 (ref 0–0.4)
EOSINOPHIL NFR BLD AUTO: 4 % (ref 0.3–6.2)
ERYTHROCYTE [DISTWIDTH] IN BLOOD BY AUTOMATED COUNT: 11.8 % (ref 12.3–15.4)
GLOBULIN UR ELPH-MCNC: 2.1 GM/DL
GLUCOSE BLDC GLUCOMTR-MCNC: 102 MG/DL (ref 70–130)
GLUCOSE BLDC GLUCOMTR-MCNC: 123 MG/DL (ref 70–130)
GLUCOSE BLDC GLUCOMTR-MCNC: 124 MG/DL (ref 70–130)
GLUCOSE BLDC GLUCOMTR-MCNC: 160 MG/DL (ref 70–130)
GLUCOSE SERPL-MCNC: 101 MG/DL (ref 65–99)
HCT VFR BLD AUTO: 37 % (ref 37.5–51)
HGB BLD-MCNC: 13.1 G/DL (ref 13–17.7)
IMM GRANULOCYTES # BLD AUTO: 0.02 10*3/MM3 (ref 0–0.05)
IMM GRANULOCYTES NFR BLD AUTO: 0.4 % (ref 0–0.5)
LYMPHOCYTES # BLD AUTO: 1.26 10*3/MM3 (ref 0.7–3.1)
LYMPHOCYTES NFR BLD AUTO: 22.7 % (ref 19.6–45.3)
MAGNESIUM SERPL-MCNC: 1.7 MG/DL (ref 1.6–2.4)
MCH RBC QN AUTO: 36.5 PG (ref 26.6–33)
MCHC RBC AUTO-ENTMCNC: 35.4 G/DL (ref 31.5–35.7)
MCV RBC AUTO: 103.1 FL (ref 79–97)
MONOCYTES # BLD AUTO: 0.94 10*3/MM3 (ref 0.1–0.9)
MONOCYTES NFR BLD AUTO: 16.9 % (ref 5–12)
NEUTROPHILS NFR BLD AUTO: 3.1 10*3/MM3 (ref 1.7–7)
NEUTROPHILS NFR BLD AUTO: 55.6 % (ref 42.7–76)
NRBC BLD AUTO-RTO: 0 /100 WBC (ref 0–0.2)
PHOSPHATE SERPL-MCNC: 2.8 MG/DL (ref 2.5–4.5)
PLATELET # BLD AUTO: 125 10*3/MM3 (ref 140–450)
PMV BLD AUTO: 9.7 FL (ref 6–12)
POTASSIUM SERPL-SCNC: 4.3 MMOL/L (ref 3.5–5.2)
PROT SERPL-MCNC: 5.6 G/DL (ref 6–8.5)
RBC # BLD AUTO: 3.59 10*6/MM3 (ref 4.14–5.8)
SODIUM SERPL-SCNC: 135 MMOL/L (ref 136–145)
WBC NRBC COR # BLD AUTO: 5.56 10*3/MM3 (ref 3.4–10.8)

## 2024-03-23 PROCEDURE — A9577 INJ MULTIHANCE: HCPCS | Performed by: INTERNAL MEDICINE

## 2024-03-23 PROCEDURE — 25010000002 MIDAZOLAM PER 1 MG: Performed by: NURSE PRACTITIONER

## 2024-03-23 PROCEDURE — 0 GADOBENATE DIMEGLUMINE 529 MG/ML SOLUTION: Performed by: INTERNAL MEDICINE

## 2024-03-23 PROCEDURE — 82948 REAGENT STRIP/BLOOD GLUCOSE: CPT

## 2024-03-23 PROCEDURE — 92523 SPEECH SOUND LANG COMPREHEN: CPT

## 2024-03-23 PROCEDURE — 85025 COMPLETE CBC W/AUTO DIFF WBC: CPT | Performed by: INTERNAL MEDICINE

## 2024-03-23 PROCEDURE — 84100 ASSAY OF PHOSPHORUS: CPT | Performed by: INTERNAL MEDICINE

## 2024-03-23 PROCEDURE — 99233 SBSQ HOSP IP/OBS HIGH 50: CPT | Performed by: STUDENT IN AN ORGANIZED HEALTH CARE EDUCATION/TRAINING PROGRAM

## 2024-03-23 PROCEDURE — 63710000001 INSULIN LISPRO (HUMAN) PER 5 UNITS: Performed by: INTERNAL MEDICINE

## 2024-03-23 PROCEDURE — 25810000003 LACTATED RINGERS PER 1000 ML: Performed by: HOSPITALIST

## 2024-03-23 PROCEDURE — 25010000002 MIDAZOLAM PER 1 MG: Performed by: PSYCHIATRY & NEUROLOGY

## 2024-03-23 PROCEDURE — 80053 COMPREHEN METABOLIC PANEL: CPT | Performed by: INTERNAL MEDICINE

## 2024-03-23 PROCEDURE — 70553 MRI BRAIN STEM W/O & W/DYE: CPT

## 2024-03-23 PROCEDURE — 25010000002 THIAMINE PER 100 MG: Performed by: PSYCHIATRY & NEUROLOGY

## 2024-03-23 PROCEDURE — 92610 EVALUATE SWALLOWING FUNCTION: CPT

## 2024-03-23 PROCEDURE — 83735 ASSAY OF MAGNESIUM: CPT | Performed by: INTERNAL MEDICINE

## 2024-03-23 PROCEDURE — 99232 SBSQ HOSP IP/OBS MODERATE 35: CPT | Performed by: INTERNAL MEDICINE

## 2024-03-23 RX ORDER — MIDAZOLAM HYDROCHLORIDE 1 MG/ML
4 INJECTION INTRAMUSCULAR; INTRAVENOUS ONCE
Status: COMPLETED | OUTPATIENT
Start: 2024-03-23 | End: 2024-03-23

## 2024-03-23 RX ORDER — LANOLIN ALCOHOL/MO/W.PET/CERES
1000 CREAM (GRAM) TOPICAL DAILY
Status: DISCONTINUED | OUTPATIENT
Start: 2024-03-23 | End: 2024-03-27 | Stop reason: HOSPADM

## 2024-03-23 RX ORDER — QUETIAPINE FUMARATE 25 MG/1
50 TABLET, FILM COATED ORAL NIGHTLY
Status: DISCONTINUED | OUTPATIENT
Start: 2024-03-23 | End: 2024-03-27 | Stop reason: HOSPADM

## 2024-03-23 RX ADMIN — OXAZEPAM 15 MG: 15 CAPSULE, GELATIN COATED ORAL at 21:06

## 2024-03-23 RX ADMIN — Medication 10 ML: at 09:09

## 2024-03-23 RX ADMIN — LOSARTAN POTASSIUM 25 MG: 25 TABLET, FILM COATED ORAL at 09:09

## 2024-03-23 RX ADMIN — MIDAZOLAM HYDROCHLORIDE 2 MG: 1 INJECTION, SOLUTION INTRAMUSCULAR; INTRAVENOUS at 18:36

## 2024-03-23 RX ADMIN — PANTOPRAZOLE SODIUM 40 MG: 40 INJECTION, POWDER, FOR SOLUTION INTRAVENOUS at 09:09

## 2024-03-23 RX ADMIN — OXAZEPAM 15 MG: 15 CAPSULE, GELATIN COATED ORAL at 05:20

## 2024-03-23 RX ADMIN — SODIUM CHLORIDE, POTASSIUM CHLORIDE, SODIUM LACTATE AND CALCIUM CHLORIDE 100 ML/HR: 600; 310; 30; 20 INJECTION, SOLUTION INTRAVENOUS at 15:42

## 2024-03-23 RX ADMIN — FOLIC ACID 1 MG: 5 INJECTION, SOLUTION INTRAMUSCULAR; INTRAVENOUS; SUBCUTANEOUS at 09:30

## 2024-03-23 RX ADMIN — CEFDINIR 300 MG: 300 CAPSULE ORAL at 09:08

## 2024-03-23 RX ADMIN — Medication 10 ML: at 21:07

## 2024-03-23 RX ADMIN — MIDAZOLAM HYDROCHLORIDE 4 MG: 1 INJECTION, SOLUTION INTRAMUSCULAR; INTRAVENOUS at 23:58

## 2024-03-23 RX ADMIN — OXAZEPAM 15 MG: 15 CAPSULE, GELATIN COATED ORAL at 13:23

## 2024-03-23 RX ADMIN — THIAMINE HYDROCHLORIDE 500 MG: 100 INJECTION, SOLUTION INTRAMUSCULAR; INTRAVENOUS at 10:56

## 2024-03-23 RX ADMIN — QUETIAPINE FUMARATE 50 MG: 25 TABLET ORAL at 21:06

## 2024-03-23 RX ADMIN — MIDAZOLAM HYDROCHLORIDE 4 MG: 1 INJECTION, SOLUTION INTRAMUSCULAR; INTRAVENOUS at 22:41

## 2024-03-23 RX ADMIN — GADOBENATE DIMEGLUMINE 10 ML: 529 INJECTION, SOLUTION INTRAVENOUS at 21:55

## 2024-03-23 RX ADMIN — ASPIRIN 81 MG: 81 TABLET, CHEWABLE ORAL at 09:08

## 2024-03-23 RX ADMIN — ATORVASTATIN CALCIUM 80 MG: 40 TABLET, FILM COATED ORAL at 21:06

## 2024-03-23 RX ADMIN — CEFDINIR 300 MG: 300 CAPSULE ORAL at 21:06

## 2024-03-23 RX ADMIN — INSULIN LISPRO 2 UNITS: 100 INJECTION, SOLUTION INTRAVENOUS; SUBCUTANEOUS at 16:47

## 2024-03-23 RX ADMIN — FLUOXETINE 20 MG: 20 CAPSULE ORAL at 09:08

## 2024-03-23 RX ADMIN — Medication 1000 MCG: at 09:09

## 2024-03-23 RX ADMIN — PANTOPRAZOLE SODIUM 40 MG: 40 INJECTION, POWDER, FOR SOLUTION INTRAVENOUS at 21:06

## 2024-03-23 RX ADMIN — SENNOSIDES AND DOCUSATE SODIUM 2 TABLET: 8.6; 5 TABLET ORAL at 11:03

## 2024-03-23 RX ADMIN — POTASSIUM CHLORIDE 40 MEQ: 1.5 POWDER, FOR SOLUTION ORAL at 05:20

## 2024-03-23 RX ADMIN — MIDAZOLAM HYDROCHLORIDE 4 MG: 1 INJECTION, SOLUTION INTRAMUSCULAR; INTRAVENOUS at 21:14

## 2024-03-23 RX ADMIN — NICOTINE 1 PATCH: 14 PATCH, EXTENDED RELEASE TRANSDERMAL at 18:35

## 2024-03-23 RX ADMIN — SODIUM CHLORIDE, POTASSIUM CHLORIDE, SODIUM LACTATE AND CALCIUM CHLORIDE 100 ML/HR: 600; 310; 30; 20 INJECTION, SOLUTION INTRAVENOUS at 04:33

## 2024-03-23 NOTE — PLAN OF CARE
Goal Outcome Evaluation:  Plan of Care Reviewed With: patient, family      SLP re-evaluation completed. Will sign-off for dysphagia. SLP will continue to address cognitive deficits. Please see note for further details and recommendations.              Anticipated Discharge Disposition (SLP): unknown, anticipate therapy at next level of care    SLP Diagnosis: mild, cognitive-linguistic disorder (03/23/24 1423)     SLP Swallowing Diagnosis: swallow WFL/no suspected pharyngeal impairment (03/23/24 1449)              Your results look fine and do not require any change in treatment.     Please contact me if you have any additional concerns.    Sincerely,    Romel Alas

## 2024-03-23 NOTE — PROGRESS NOTES
[facility] Neurology    Progress Note    Patient Name: Patrice Howard  : 1945  MRN: [id number]  Primary Care Physician:  eDlano Song DO  Date of admission: 3/20/2024    Subjective     Reason for consult: Altered mental status    History of Present Illness   Mental status improved.  Continues to ask if he is okay to go home tonight    Review of Systems   General: Negative for fever, nausea, or vomiting.   Neurological: Negative for headache, pain, or weakness.     Objective     Vitals:   Temp:  [96.8 °F (36 °C)-99 °F (37.2 °C)] 96.8 °F (36 °C)  Heart Rate:  [53-80] 75  Resp:  [18-20] 18  BP: ()/(75-89) 149/88    Neurologic Exam   Mental status/Cognition: alert; oriented to person, place, not to year, or month  Speech/language: fluent; comprehension intact    Cranial nerves: PERRL. EOMI. Face appears symmetric. No dysarthria.  Shoulder shrug symmetric.  Tongue protrudes midline    Motor: No drift in any extremities, able to raise all to antigravity.    Sensation: intact to light touch throughout    Coordination/Complex Motor: Finger-to-nose intact bilaterally without dysmetria    Current Medications    Current Facility-Administered Medications:     acetaminophen (TYLENOL) suppository 650 mg, 650 mg, Rectal, Q6H PRN, Vanesa Hutchison PA-C, 650 mg at 24 0105    acetaminophen (TYLENOL) tablet 650 mg, 650 mg, Oral, Q6H PRN, Fabiano Albarran MD, 650 mg at 24 1859    aspirin chewable tablet 81 mg, 81 mg, Oral, Daily, 81 mg at 24 0908 **OR** aspirin suppository 300 mg, 300 mg, Rectal, Daily, Yanick Curtis MD    atorvastatin (LIPITOR) tablet 80 mg, 80 mg, Oral, Nightly, Jose Jimenez PA-C, 80 mg at 24 2145    sennosides-docusate (PERICOLACE) 8.6-50 MG per tablet 2 tablet, 2 tablet, Oral, BID PRN, 2 tablet at 24 1730 **AND** polyethylene glycol (MIRALAX) packet 17 g, 17 g, Oral, Daily PRN **AND** bisacodyl (DULCOLAX) EC tablet 5 mg, 5 mg, Oral, Daily PRN  **AND** bisacodyl (DULCOLAX) suppository 10 mg, 10 mg, Rectal, Daily PRN, Yanick Curtis MD    Calcium Replacement - Follow Nurse / BPA Driven Protocol, , Does not apply, PRN, Yanick Curtis MD    cefdinir (OMNICEF) capsule 300 mg, 300 mg, Oral, Q12H, Fabiano Albarran MD, 300 mg at 03/23/24 0908    dextrose (D50W) (25 g/50 mL) IV injection 25 g, 25 g, Intravenous, Q15 Min PRN, Fabiano Albarran MD    dextrose (GLUTOSE) oral gel 15 g, 15 g, Oral, Q15 Min PRN, Fabiano Albarrna MD    FLUoxetine (PROzac) capsule 20 mg, 20 mg, Oral, Daily, Fabiano Albarran MD, 20 mg at 03/23/24 0908    folic acid 1 mg in sodium chloride 0.9 % 50 mL IVPB, 1 mg, Intravenous, Daily, Yanick Curtis MD, Last Rate: 100 mL/hr at 03/23/24 0930, 1 mg at 03/23/24 0930    glucagon (GLUCAGEN) injection 1 mg, 1 mg, Intramuscular, Q15 Min PRN, Yanick Curtis MD    Insulin Lispro (humaLOG) injection 2-7 Units, 2-7 Units, Subcutaneous, 4x Daily AC & at Bedtime, Fabiano Albarran MD    lactated ringers infusion, 100 mL/hr, Intravenous, Continuous, Yanick Curtis MD, Last Rate: 100 mL/hr at 03/23/24 0433, 100 mL/hr at 03/23/24 0433    LORazepam (ATIVAN) tablet 0.5 mg, 0.5 mg, Oral, Q1H PRN **OR** midazolam (VERSED) injection 2 mg, 2 mg, Intravenous, Q1H PRN **OR** LORazepam (ATIVAN) tablet 2 mg, 2 mg, Oral, Q1H PRN **OR** midazolam (VERSED) injection 4 mg, 4 mg, Intravenous, Q1H PRN, 4 mg at 03/22/24 1287 **OR** midazolam (VERSED) injection 4 mg, 4 mg, Intravenous, Q15 Min PRN **OR** midazolam (VERSED) injection 4 mg, 4 mg, Intramuscular, Q15 Min PRN, Kev Rushing MD    losartan (COZAAR) tablet 25 mg, 25 mg, Oral, Daily, Fabiano Albarran MD, 25 mg at 03/23/24 0909    Magnesium Standard Dose Replacement - Follow Nurse / BPA Driven Protocol, , Does not apply, PRN, Yanick Curtis MD    nicotine (NICODERM CQ) 14 MG/24HR patch 1 patch, 1 patch, Transdermal, Q24H, Fabiano Albarran MD, 1 patch at  03/22/24 1729    ondansetron ODT (ZOFRAN-ODT) disintegrating tablet 4 mg, 4 mg, Oral, Q6H PRN, Yanick Curtis MD    oxazepam (SERAX) capsule 15 mg, 15 mg, Oral, Q8H, Fabiano Albarran MD, 15 mg at 03/23/24 0520    pantoprazole (PROTONIX) injection 40 mg, 40 mg, Intravenous, Q12H, Fabinao Albarran MD, 40 mg at 03/23/24 0909    Phosphorus Replacement - Follow Nurse / BPA Driven Protocol, , Does not apply, Ever BORGES Marc P, MD    Potassium Replacement - Follow Nurse / BPA Driven Protocol, , Does not apply, Ever BORGES Marc P, MD    prochlorperazine (COMPAZINE) injection 5 mg, 5 mg, Intravenous, Q6H PRN **OR** prochlorperazine (COMPAZINE) tablet 5 mg, 5 mg, Oral, Q6H PRN **OR** prochlorperazine (COMPAZINE) suppository 25 mg, 25 mg, Rectal, Q12H PRN, Yanick Curtis MD    QUEtiapine (SEROquel) tablet 50 mg, 50 mg, Oral, Nightly, Fabiano Albarran MD    sodium chloride 0.9 % flush 10 mL, 10 mL, Intravenous, Q12H, Jose Jimenez PA-C, 10 mL at 03/22/24 2145    sodium chloride 0.9 % flush 10 mL, 10 mL, Intravenous, PRN, Jose Jimenez PA-C    sodium chloride 0.9 % flush 10 mL, 10 mL, Intravenous, Q12H, Yanick Curtis MD, 10 mL at 03/23/24 0909    sodium chloride 0.9 % flush 10 mL, 10 mL, Intravenous, PRN, Yanick Curtis MD    sodium chloride 0.9 % infusion 40 mL, 40 mL, Intravenous, PRN, Jose Jimenez PA-C    sodium chloride 0.9 % infusion 40 mL, 40 mL, Intravenous, PRN, Yanick Curtis MD    thiamine (B-1) 500 mg in sodium chloride 0.9 % 100 mL IVPB, 500 mg, Intravenous, Q8H, Kev Rushing MD, Last Rate: 200 mL/hr at 03/22/24 2259, 500 mg at 03/22/24 2259    [START ON 3/24/2024] thiamine (VITAMIN B-1) tablet 100 mg, 100 mg, Oral, Daily, Kev Rushing MD    vitamin B-12 (CYANOCOBALAMIN) tablet 1,000 mcg, 1,000 mcg, Oral, Daily, Fabiano Albarran MD, 1,000 mcg at 03/23/24 0909    ziprasidone (GEODON) injection 10 mg, 10 mg, Intramuscular, Q6H PRN, Fabiano Albarran  MD Zeferino, 10 mg at 03/22/24 0606    Laboratory Results:   Lab Results   Component Value Date    GLUCOSE 101 (H) 03/23/2024    CALCIUM 8.9 03/23/2024     (L) 03/23/2024    K 4.3 03/23/2024    CO2 23.0 03/23/2024     03/23/2024    BUN 6 (L) 03/23/2024    CREATININE 0.66 (L) 03/23/2024    BCR 9.1 03/23/2024    ANIONGAP 10.0 03/23/2024     Lab Results   Component Value Date    WBC 5.56 03/23/2024    HGB 13.1 03/23/2024    HCT 37.0 (L) 03/23/2024    .1 (H) 03/23/2024     (L) 03/23/2024     Lab Results   Component Value Date    CHOL 131 03/21/2024     Lab Results   Component Value Date    HDL 63 (H) 03/21/2024     Lab Results   Component Value Date    LDL 50 03/21/2024     Lab Results   Component Value Date    TRIG 95 03/21/2024     Lab Results   Component Value Date    HGBA1C 5.30 03/21/2024     Lab Results   Component Value Date    FOLATE 9.98 03/20/2024     Lab Results   Component Value Date    [id number] 279 03/20/2024         Images/Procedures   CT head 03/20/2024  Mild generalized atrophy.  Mild chronic small vessel ischemic disease.    CT angio brain and neck 3/20/2024  No LVO or significant stenosis.  CT perfusion 3/20/2024  Tiny focus of prolonged Tmax within right occipital lobe.  Possibly artifactual.    CT head 3/20/2024  No acute intracranial abnormalities.    MRI brain with and without contrast 3/20/2024  Atrophy and chronic microvascular ischemic disease.    Routine EEG 3/21/2024  Normal study and awake and asleep states.    Assessment / Plan   Active Hospital Problems:  Headache, improved  Toxic metabolic encephalopathy, improved  Alcohol withdrawal  B12 deficiency  Strokelike symptoms    Brief Patient Summary:  Patrice Howard is a 78 y.o. male with history of type 2 diabetes, daily alcohol use, prostate cancer, hypertension presenting with altered mental status and headache.  Hospital course complicated by alcohol withdrawal requiring benzodiazepines, headache and low-grade  fever, delirium.  CT head, CTA unremarkable.  CT perfusion shows small area of prolonged Tmax within the right occipital lobe, but MRI negative for acute stroke.  Overall mental status has improved.  Patient had worsening neglect, unilateral weakness, leaning and repeat MRI is pending.    Plan:   -Repeat MRI brain pending  -Seroquel 50 mg nightly  -Thiamine 100 mg daily  -CIWA precautions  -Geodon 10 mg every 6 hours as needed for agitation  -Hold off on LP for now given improvement of mental status    I have discussed the above with the patient, family, bedside RN  Time spent with patient: 45 minutes in face-to-face evaluation and management of the patient.      Senthil Pereira DO, MS

## 2024-03-23 NOTE — PROGRESS NOTES
[facility] Medicine Services  PROGRESS NOTE    Patient Name: Patrice Howard  : 1945  MRN: [id number]    Date of Admission: 3/20/2024  Primary Care Physician: Delano Song DO    Subjective   Subjective     CC:  Follow-up for alcohol drawl    HPI:  Patient seen and examined this morning.  More awake and alert today.  Son spent the night with him and reported that bedtime Seroquel helped him a lot and he is not delirious anymore.  Patient and son agreeable to acute rehab upon discharge    Objective   Objective     Vital Signs:   Temp:  [97.6 °F (36.4 °C)-99.3 °F (37.4 °C)] 98.3 °F (36.8 °C)  Heart Rate:  [53-69] 53  Resp:  [18-20] 18  BP: ()/(75-95) 150/81     Physical Exam:  General: Chronically ill and frail looking, not in distress, conversant and cooperative  Head: Atraumatic and normocephalic  Eyes: No Icterus. No pallor  Ears:  Ears appear intact with no abnormalities noted  Throat: No oral lesions, no thrush  Neck: Supple, trachea midline  Lungs: Clear to auscultation bilaterally, equal air entry, no wheezing or crackles  Heart:  Normal S1 and S2, no murmur, no gallop, No JVD, no lower extremity swelling  Abdomen:  Soft, no tenderness, no organomegaly, normal bowel sounds, no organomegaly  Extremities: pulses equal bilaterally  Skin: No bleeding, bruising or rash, normal skin turgor and elasticity  Neurologic: Cranial nerves appear intact with no evidence of facial asymmetry, normal motor and sensory functions in all 4 extremities  Psych: Alert and oriented x 3, lethargic    Results Reviewed:  LAB RESULTS:      Lab 24  0458 24  0700 24  1253 24  1249   WBC 4.93 7.09 8.14  --    HEMOGLOBIN 13.3 12.5* 14.1  --    HEMOGLOBIN, POC  --   --   --  14.6   HEMATOCRIT 36.4* 36.1* 40.1  --    HEMATOCRIT POC  --   --   --  43   PLATELETS 106* 118* 154  --    NEUTROS ABS 3.39 4.02 5.62  --    IMMATURE GRANS (ABS) 0.02 0.02 0.03  --    LYMPHS ABS 0.70  1.68 1.23  --    MONOS ABS 0.77 1.34* 1.22*  --    EOS ABS 0.04 0.01 0.01  --    .8* 108.4* 104.2*  --    APTT  --   --  28.0  --          Lab 03/22/24 2037 03/22/24  0458 03/21/24  0700 03/20/24  1249   SODIUM  --  130* 132*  --    POTASSIUM 3.6 3.5 3.7  --    CHLORIDE  --  95* 98  --    CO2  --  24.0 22.0  --    ANION GAP  --  11.0 12.0  --    BUN  --  10 11  --    CREATININE  --  0.69* 0.81 0.90   EGFR  --  94.7 90.2 87.4   GLUCOSE  --  153* 96  --    CALCIUM  --  8.5* 8.3*  --    MAGNESIUM  --  1.7  --   --    PHOSPHORUS  --  2.6  --   --    HEMOGLOBIN A1C  --   --  5.30  --    TSH  --   --  0.733  --          Lab 03/22/24 0458 03/21/24  0700 03/20/24  1253   TOTAL PROTEIN 6.0 5.9*  --    ALBUMIN 3.9 3.9  --    GLOBULIN 2.1 2.0  --    ALT (SGPT) 15 17 27   AST (SGOT) 19 19 27   BILIRUBIN 1.0 1.0  --    ALK PHOS 80 64  --          Lab 03/20/24  1253   HSTROP T 11         Lab 03/21/24  0700   CHOLESTEROL 131   LDL CHOL 50   HDL CHOL 63*   TRIGLYCERIDES 95         Lab 03/20/24  2225   FOLATE 9.98   VITAMIN B 12 279         Brief Urine Lab Results  (Last result in the past 365 days)        Color   Clarity   Blood   Leuk Est   Nitrite   Protein   CREAT   Urine HCG        03/20/24 1345 Yellow   Clear   Small (1+)   Negative   Negative   Negative                   Microbiology Results Abnormal       Procedure Component Value - Date/Time    COVID PRE-OP / PRE-PROCEDURE SCREENING ORDER (NO ISOLATION) - Swab, Nasopharynx [[id number]]  (Normal) Collected: 03/20/24 1346    Lab Status: Final result Specimen: Swab from Nasopharynx Updated: 03/20/24 1446    Narrative:      The following orders were created for panel order COVID PRE-OP / PRE-PROCEDURE SCREENING ORDER (NO ISOLATION) - Swab, Nasopharynx.  Procedure                               Abnormality         Status                     ---------                               -----------         ------                     Respiratory Panel PCR w/...[[id number]]  Normal               Final result                 Please view results for these tests on the individual orders.    Respiratory Panel PCR w/COVID-19(SARS-CoV-2) [facility] In-House, NP Swab in UTM/VTM, 2 HR TAT - Swab, Nasopharynx [[id number]]  (Normal) Collected: 03/20/24 1346    Lab Status: Final result Specimen: Swab from Nasopharynx Updated: 03/20/24 1446     ADENOVIRUS, PCR Not Detected     Coronavirus 229E Not Detected     Coronavirus HKU1 Not Detected     Coronavirus NL63 Not Detected     Coronavirus OC43 Not Detected     COVID19 Not Detected     Human Metapneumovirus Not Detected     Human Rhinovirus/Enterovirus Not Detected     Influenza A PCR Not Detected     Influenza B PCR Not Detected     Parainfluenza Virus 1 Not Detected     Parainfluenza Virus 2 Not Detected     Parainfluenza Virus 3 Not Detected     Parainfluenza Virus 4 Not Detected     RSV, PCR Not Detected     Bordetella pertussis pcr Not Detected     Bordetella parapertussis PCR Not Detected     Chlamydophila pneumoniae PCR Not Detected     Mycoplasma pneumo by PCR Not Detected    Narrative:      In the setting of a positive respiratory panel with a viral infection PLUS a negative procalcitonin without other underlying concern for bacterial infection, consider observing off antibiotics or discontinuation of antibiotics and continue supportive care. If the respiratory panel is positive for atypical bacterial infection (Bordetella pertussis, Chlamydophila pneumoniae, or Mycoplasma pneumoniae), consider antibiotic de-escalation to target atypical bacterial infection.            Adult Transthoracic Echo Complete W/ Cont if Necessary Per Protocol (With Agitated Saline)    Result Date: 3/22/2024    Left ventricular systolic function is normal. Left ventricular ejection fraction appears to be 56 - 60%.   Left atrial volume is normal. No evidence of a patent foramen ovale. Saline test results are negative.   Trace mitral valve regurgitation is  present.   Trace tricuspid valve regurgitation is present. Estimated right ventricular systolic pressure from tricuspid regurgitation is normal (<35 mmHg).   Mild dilation of the ascending aorta is present. Ascending aorta = 4.2 cm     EEG    Result Date: 3/21/2024  Reason for referral: 78 y.o.male with altered mental status Technical Summary:  A 19 channel digital EEG was performed using the international 10-20 placement system, including eye leads and EKG leads. Duration: 21 minute Findings: The patient is resting quietly in a chair and appears asleep.  The background shows diffuse low amplitude theta activity with intermixed sleep spindles present.  IV pole artifact is variably present.  Toward the latter half of the tracing, the patient rouses and is clearly awake.  There is an increase in faster alpha and theta frequencies over both hemispheres.  No lateralizing features are seen.  No epileptiform activity or electrographic seizures are present.  Hyperventilation and photic stimulation are not performed. Video: Available Technical quality: Superior EKG: Regular, 60 bpm SUMMARY: Normal EEG in the awake and asleep states No focal features or epileptiform activity are seen     Impression: Normal study This report is transcribed using the Dragon dictation system.       Results for orders placed during the hospital encounter of 03/20/24    Adult Transthoracic Echo Complete W/ Cont if Necessary Per Protocol (With Agitated Saline)    Interpretation Summary    Left ventricular systolic function is normal. Left ventricular ejection fraction appears to be 56 - 60%.    Left atrial volume is normal. No evidence of a patent foramen ovale. Saline test results are negative.    Trace mitral valve regurgitation is present.    Trace tricuspid valve regurgitation is present. Estimated right ventricular systolic pressure from tricuspid regurgitation is normal (<35 mmHg).    Mild dilation of the ascending aorta is present.  Ascending aorta = 4.2 cm      Current medications:  Scheduled Meds:aspirin, 81 mg, Oral, Daily   Or  aspirin, 300 mg, Rectal, Daily  atorvastatin, 80 mg, Oral, Nightly  cefdinir, 300 mg, Oral, Q12H  cyanocobalamin, 1,000 mcg, Intramuscular, Daily  FLUoxetine, 20 mg, Oral, Daily  folic acid 1 mg in sodium chloride 0.9 % 50 mL IVPB, 1 mg, Intravenous, Daily  insulin lispro, 2-7 Units, Subcutaneous, 4x Daily AC & at Bedtime  losartan, 25 mg, Oral, Daily  nicotine, 1 patch, Transdermal, Q24H  oxazepam, 15 mg, Oral, Q8H  pantoprazole, 40 mg, Intravenous, Q12H  QUEtiapine, 50 mg, Oral, Q12H  sodium chloride, 10 mL, Intravenous, Q12H  sodium chloride, 10 mL, Intravenous, Q12H  thiamine (B-1) IV, 500 mg, Intravenous, Daily  thiamine (B-1) IV, 500 mg, Intravenous, Q8H  [START ON 3/24/2024] thiamine, 100 mg, Oral, Daily      Continuous Infusions:lactated ringers, 100 mL/hr, Last Rate: 100 mL/hr (03/23/24 0433)      PRN Meds:.  acetaminophen    acetaminophen    senna-docusate sodium **AND** polyethylene glycol **AND** bisacodyl **AND** bisacodyl    Calcium Replacement - Follow Nurse / BPA Driven Protocol    dextrose    dextrose    glucagon (human recombinant)    LORazepam **OR** midazolam **OR** LORazepam **OR** midazolam **OR** midazolam **OR** midazolam    Magnesium Standard Dose Replacement - Follow Nurse / BPA Driven Protocol    ondansetron ODT    Phosphorus Replacement - Follow Nurse / BPA Driven Protocol    Potassium Replacement - Follow Nurse / BPA Driven Protocol    prochlorperazine **OR** prochlorperazine **OR** prochlorperazine    sodium chloride    sodium chloride    sodium chloride    sodium chloride    ziprasidone    Assessment & Plan   Assessment & Plan     Active Hospital Problems    Diagnosis  POA    **Aphasia [R47.01]  Yes    Moderate malnutrition [E44.0]  Yes    Fever [R50.9]  Yes    Acute metabolic encephalopathy [G93.41]  Yes    Alcohol use [Z78.9]  Unknown    History of prostate cancer [Z85.46]  Not  Applicable    HTN (hypertension) [I10]  Unknown      Resolved Hospital Problems   No resolved problems to display.        Brief Hospital Course to date:  Patrice Howard is a 78 y.o. male with history of type 2 diabetes, ongoing tobacco and alcohol use, history of prostate cancer status post resection, essential hypertension, who presented to the hospital with slurred speech and left-sided weakness    Acute metabolic encephalopathy, improving  Alcohol withdrawal  B12 deficiency  Drinks at least 3 double shots of bourbon every night  Lives alone and independent with ADLs but feeling depressed and admits to using alcohol to treat his depression  Continue CIWA protocol  Continue Serax 15mg 3 times daily   Seroquel 50 mg twice daily helped his acute delirium tremendously.  Will decrease the dose to 50 at bedtime   Continue high-dose IV thiamine and continue IM B12 supplement.  Will need prescription upon discharge  Extensive discussion with the patient and his son at bedside regarding alcohol dependence and withdrawal    Possible alcohol induced gastritis  Nausea and vomiting for a week with poor oral intake.  Currently improved  Continue IV Protonix twice daily    Possible UTI, POA  Has urinary symptoms with dysuria and urgency  Continue p.o. cefdinir    Hypoosmolar hyponatremia  Improved with IV fluids.  Encourage p.o. intake    TIA  Acute stroke, ruled out  Presented with left-sided weakness and slurred speech.  Stroke imaging studies including CT head and MRI brain negative  Continue aspirin and statins  Neurology team following    Vitamin B12 deficiency  B12 level is low at 270  Continue IM replacement    Essential pretension  Restart losartan    Type 2 diabetes  A1c 5.3%  SSI    Hx of prostate cancer  Status post resection  on Lupron     Depression  Start Prozac    Severe debility  PT and OT consultation  Inpatient rehab upon discharge.  Case management on board    Expected Discharge Location and Transportation:  Inpatient rehab  Expected Discharge   Expected Discharge Date: 3/25/2024; Expected Discharge Time:      DVT prophylaxis:  Mechanical DVT prophylaxis orders are present.         AM-PAC 6 Clicks Score (PT): 8 (03/22/24 2000)    CODE STATUS:   Code Status and Medical Interventions:   Ordered at: 03/20/24 1429     Code Status (Patient has no pulse and is not breathing):    CPR (Attempt to Resuscitate)     Medical Interventions (Patient has pulse or is breathing):    Full Support     Copied text in this note has been reviewed and is accurate as of 03/23/24.     Fabiano Albarran MD  03/23/24

## 2024-03-23 NOTE — THERAPY EVALUATION
Acute Care - Speech Language Pathology   Swallow Re-Evaluation [facility]  Clinical Swallow Evaluation       Patient Name: Patrice Howard  : 1945  MRN: [id number]  Today's Date: 3/23/2024               Admit Date: 3/20/2024    Visit Dx:     ICD-10-CM ICD-9-CM   1. Altered mental status, unspecified altered mental status type  R41.82 780.97   2. Difficulty with speech  R47.9 784.59   3. Dysphagia, unspecified type  R13.10 787.20     Patient Active Problem List   Diagnosis    Aphasia    Alcohol use    History of prostate cancer    HTN (hypertension)    Fever    Acute metabolic encephalopathy    Moderate malnutrition     Past Medical History:   Diagnosis Date    Diabetes     Elevated PSA     Prostate cancer      History reviewed. No pertinent surgical history.    SLP Recommendation and Plan  SLP Swallowing Diagnosis: swallow WFL/no suspected pharyngeal impairment (24 144)  SLP Diet Recommendation: regular textures, thin liquids (24 144)  Recommended Precautions and Strategies: upright posture during/after eating, small bites of food and sips of liquid, general aspiration precautions (24 144)  SLP Rec. for Method of Medication Administration: meds whole, as tolerated (24 144)     Monitor for Signs of Aspiration: yes, notify SLP if any concerns (24 144)     Swallow Criteria for Skilled Therapeutic Interventions Met: no problems identified which require skilled intervention (24 144)  Anticipated Discharge Disposition (SLP): unknown, anticipate therapy at next level of care (24 1423)     Therapy Frequency (Swallow): evaluation only (24 144)  Predicted Duration Therapy Intervention (Days): until discharge (24 1423)  Oral Care Recommendations: Oral Care BID/PRN, Toothbrush (24 144)                                        Plan of Care Reviewed With: patient, family      SWALLOW EVALUATION (Last 72 Hours)       SLP Adult Swallow Evaluation       Row  Name 03/23/24 1445 03/22/24 1410 03/21/24 0910             Rehab Evaluation    Document Type evaluation  - re-evaluation  - evaluation  -      Subjective Information no complaints  - no complaints  - no complaints  -      Patient Observations alert;cooperative;agree to therapy  -KH lethargic  - lethargic  -      Patient/Family/Caregiver Comments/Observations cousin present  - Family present  - Son present  -MH      Patient Effort good  -KH fair  -MH fair  -MH      Symptoms Noted During/After Treatment none  -KH none  - none  -         General Information    Patient Profile Reviewed yes  -KH yes  -MH yes  -      Pertinent History Of Current Problem See prev eval  -KH See initial eval  -MH Adm w/ AMS. Hx: diabetes, tobacco use, daily alcohol use, prostate CA s/p resection w/ partial XRT, HTN. MRI negative for acute intracranial abnormalities  -      Current Method of Nutrition pureed;thin liquids  - regular textures;thin liquids;other (see comments)  CSE completed 3/21 w/ recs for NPO, MD entered diet later in PM  - NPO  -      Precautions/Limitations, Vision WFL with corrective lenses;for purposes of eval  -KH difficult to assess;other (see comments)  Pt w/ eyes closed majority of eval  -MH difficult to assess;other (see comments)  pt w/ eyes closed majority of eval  -      Precautions/Limitations, Hearing WFL;for purposes of eval  -KH difficult to assess  - difficult to assess  -MH      Prior Level of Function-Communication WFL  -KH unknown  - unknown  -      Prior Level of Function-Swallowing no diet consistency restrictions  - no diet consistency restrictions;other (see comments)  per family report  - no diet consistency restrictions;other (see comments)  per family  -      Plans/Goals Discussed with patient and family;agreed upon  -KH patient;family;agreed upon  - patient;family;agreed upon  -      Barriers to Rehab none identified  -KH none identified  - none  identified  -      Patient's Goals for Discharge -- patient did not state  - patient did not state  -      Family Goals for Discharge -- family did not state  - family did not state  -         Pain    Additional Documentation Pain Scale: FACES Pre/Post-Treatment (Group)  - Pain Scale: FACES Pre/Post-Treatment (Group)  - Pain Scale: FACES Pre/Post-Treatment (Group)  -         Pain Scale: FACES Pre/Post-Treatment    Pain: FACES Scale, Pretreatment 0-->no hurt  -KH 0-->no hurt  - 0-->no hurt  -      Posttreatment Pain Rating 0-->no hurt  -KH 0-->no hurt  - 0-->no hurt  -         Oral Motor Structure and Function    Dentition Assessment natural, present and adequate  - natural, present and adequate  - natural, present and adequate  -      Secretion Management WNL/WFL  - WNL/WFL  - WNL/WFL  -      Mucosal Quality moist, healthy  - moist, healthy  - moist, healthy  -         Oral Musculature and Cranial Nerve Assessment    Oral Motor General Assessment generalized oral motor weakness  - generalized oral motor weakness  - generalized oral motor weakness  -         General Eating/Swallowing Observations    Respiratory Support Currently in Use room air  - room air  - room air  -      Eating/Swallowing Skills self-fed  - self-fed;fed by SLP;needed assist  - fed by SLP;self-fed  -      Positioning During Eating upright in bed  - upright in bed  - upright in bed  -      Utensils Used spoon;cup;straw  - spoon;cup;straw  - spoon;cup;straw  -      Consistencies Trialed regular textures;pureed;thin liquids;ice chips  - ice chips;thin liquids;pureed  - ice chips;thin liquids;pureed  -         Clinical Swallow Eval    Oral Prep Phase [facility] -- --      Oral Transit [facility] -- --      Oral Residue [facility] -- --      Pharyngeal Phase no overt signs/symptoms of pharyngeal impairment  - suspected pharyngeal impairment  - --  R/o pharyngeal dysphagia  -       Clinical Swallow Evaluation Summary Pt with significant improvement in mental status today compared to note from yesterday. Pt with no overt clinical s/sx of aspiration with any trial. Pt with seemingly adequate oral prep of solid. Recommend regular diet and thin liquids, standard aspiration precautions, and oral care. SLP will sign off for dysphagia and continue to f/u for cognitive tx.  - Cont w/ lethargy, cues required t/o for pt to accept PO trials. Pt accepted minimal PO trials. Intermittent cough w/ thins via straw only. No s/s w/ thins via tsp/cup or pureed trials. Regular solid trials deferred 2' lethargy/cog status. Pt not appropriate for instrumental this date given poor acceptance of PO. Recommend pureed diet w/ thin liquids, no straw, 1:1 assistance, feed only when fully awake/alert. SLP will f/u for re-eval 3/23  - Pt lethargic, cues required t/o for pt to remain awake/alert. Pt became agitated quickly, attempting to get out of bed; RN present/aware. Delayed cough x1 w/ initial thin liquid trial via tsp, u/a to replicate accross multiple additional trials. No s/s w/ thins via cup/straw; even when consecutive sips. No s/s w/ pureed. Given inability to remain awake/alert, u/a to safely recommend PO diet @ this time. Recommend cont NPO, essential meds ok crushed/whole in applesauce/pudding as tolerated. SLP will f/u for re-eval  -         Pharyngeal Phase Concerns    Pharyngeal Phase Concerns -- cough  - --      Cough -- thin;other (see comments)  via straw only  - --         SLP Evaluation Clinical Impression    SLP Swallowing Diagnosis swallow WFL/no suspected pharyngeal impairment  - suspected pharyngeal dysphagia  - R/O pharyngeal dysphagia  -      Functional Impact no impact on function  - risk of aspiration/pneumonia  - risk of aspiration/pneumonia  -      Rehab Potential/Prognosis, Swallowing -- good, to achieve stated therapy goals  - good, to achieve stated therapy  goals  -      Swallow Criteria for Skilled Therapeutic Interventions Met no problems identified which require skilled intervention  - demonstrates skilled criteria  - demonstrates skilled criteria  -         Recommendations    Therapy Frequency (Swallow) evaluation only  - -- --      Predicted Duration Therapy Intervention (Days) -- until discharge  - until discharge  -      SLP Diet Recommendation regular textures;thin liquids  - puree;thin liquids;other (see comments)  no straw  - NPO  -      Recommended Diagnostics -- reassess via clinical swallow evaluation  - reassess via clinical swallow evaluation  -      Recommended Precautions and Strategies upright posture during/after eating;small bites of food and sips of liquid;general aspiration precautions  - general aspiration precautions;1:1 supervision;assist with feeding;small bites of food and sips of liquid;other (see comments)  Feed only when fully awake/alert  - general aspiration precautions  -      Oral Care Recommendations Oral Care BID/PRN;Toothbrush  - Oral Care BID/PRN;Suction toothbrush  - Oral Care BID/PRN;Suction toothbrush  -      SLP Rec. for Method of Medication Administration meds whole;as tolerated  - meds whole;meds crushed;with puree;as tolerated  - meds whole;meds crushed;with puree;as tolerated  -      Monitor for Signs of Aspiration yes;notify SLP if any concerns  - yes;notify SLP if any concerns  - yes;notify SLP if any concerns  -      Anticipated Discharge Disposition (SLP) -- unknown;anticipate therapy at next level of care  - unknown;anticipate therapy at next level of care  -      Demonstrates Need for Referral to Another Service -- speech therapy;other (see comments)  SLC eval pending pt status  - --                User Key  (r) = Recorded By, (t) = Taken By, (c) = Cosigned By      Initials Name Effective Dates    Danitza Kim, MS CCC-SLP 01/15/24 -      Olga Casey MS  CCC-SLP 05/12/23 -                     EDUCATION  The patient has been educated in the following areas:   Cognitive Impairment Communication Impairment.        SLP GOALS       Row Name 03/23/24 1423             Patient will demonstrate functional cognitive-linguistic skills for return to discharge environment    [place] Independently  -      Time frame by discharge  -         Orientation Goal 1 (SLP)    Improve Orientation Through Goal 1 (SLP) demonstrating orientation to day;demonstrating orientation to month;demonstrating orientation to year;demonstrating orientation to place;demonstrating orientation to disease/impairment;use environmental aids to assist with orientation;90%;independently (over 90% accuracy)  -      Time Frame (Orientation Goal 1, SLP) short term goal (STG);by discharge  -         Memory Skills Goal 1 (SLP)    Improve Memory Skills Through Goal 1 (SLP) recalling related word lists with an imposed delay;recalling unrelated word lists with an imposed delay;use memory strategies;80%;with minimal cues (75-90%)  -      Time Frame (Memory Skills Goal 1, SLP) short term goal (STG);by discharge  -         Organizational Skills Goal 1 (SLP)    Improve Thought Organization Through Goal 1 (SLP) completing a divergent naming task;completing a convergent naming task;80%;with minimal cues (75-90%)  -      Time Frame (Thought Organization Skills Goal 1, SLP) short term goal (STG);by discharge  -         Reasoning Goal 1 (SLP)    Improve Reasoning Through Goal 1 (SLP) complete basic reasoning task;complete high level reasoning task;80%;with minimal cues (75-90%)  -      Time Frame (Reasoning Goal 1, SLP) short term goal (STG);by discharge  -         Functional Problem Solving Skills Goal 1 (SLP)    Improve Problem Solving Through Goal 1 (SLP) determine solutions to simple ADL/safety problems;determine solutions to complex problems;80%;with minimal cues (75-90%)  -      Time Frame  (Problem Solving Goal 1, SLP) short term goal (STG);by discharge  -         Executive Functional Skills Goal 1 (SLP)    Improve Executive Function Skills Goal 1 (SLP) demonstrate awareness of deficit;identify strategies, strengths, limitations;identify anticipated needs;80%;with minimal cues (75-90%)  -      Time Frame (Executive Function Skills Goal 1, SLP) short term goal (STG);by discharge  -                User Key  (r) = Recorded By, (t) = Taken By, (c) = Cosigned By      Initials Name Provider Type    Danitza Kim MS CCC-SLP Speech and Language Pathologist                       Time Calculation:    Time Calculation- SLP       Row Name 24 1727             Time Calculation- SLP    SLP Start Time 1423  -KH      SLP Received On 24  -KH         Untimed Charges    SLP Eval/Re-eval  ST Eval Oral Pharyng Swallow - 86796;ST Eval Speech and Production w/ Language - 26355  -KH      74720-QJ Eval Speech and Production w/ Language Minutes 60  -KH      23878-SI Eval Oral Pharyng Swallow Minutes 40  -KH         Total Minutes    Untimed Charges Total Minutes 100  -KH       Total Minutes 100  -KH                User Key  (r) = Recorded By, (t) = Taken By, (c) = Cosigned By      Initials Name Provider Type    Danitza Kim MS CCC-SLP Speech and Language Pathologist                    Therapy Charges for Today       Code Description Service Date Service Provider Modifiers Qty    99066130712 HC ST EVAL ORAL PHARYNG SWALLOW 3 3/23/2024 Danitza Garcia MS CCC-SLP GN 1    10186946062 HC ST EVAL SPEECH AND PROD W LANG  4 3/23/2024 Danitza Garcia MS CCC-SLP GN 1                 Danitza Garcia MS CCC-SLP  3/23/2024   and Acute Care - Speech Language Pathology Initial Evaluation  [facility]  Cognitive-Communication Evaluation       Patient Name: Patrice Howard  : 1945  MRN: [id number]  Today's Date: 3/23/2024               Admit Date: 3/20/2024     Visit Dx:    ICD-10-CM ICD-9-CM   1. Altered mental  status, unspecified altered mental status type  R41.82 780.97   2. Difficulty with speech  R47.9 784.59   3. Dysphagia, unspecified type  R13.10 787.20     Patient Active Problem List   Diagnosis    Aphasia    Alcohol use    History of prostate cancer    HTN (hypertension)    Fever    Acute metabolic encephalopathy    Moderate malnutrition     Past Medical History:   Diagnosis Date    Diabetes     Elevated PSA     Prostate cancer      History reviewed. No pertinent surgical history.    SLP Recommendation and Plan  SLP Diagnosis: mild, cognitive-linguistic disorder (03/23/24 1423)           Swallow Criteria for Skilled Therapeutic Interventions Met: no problems identified which require skilled intervention (03/23/24 1445)  SLC Criteria for Skilled Therapy Interventions Met: yes (03/23/24 1423)  Anticipated Discharge Disposition (SLP): unknown, anticipate therapy at next level of care (03/23/24 1423)     Therapy Frequency (Swallow): evaluation only (03/23/24 1445)  Therapy Frequency (SLP SLC): 3 days per week (03/23/24 1423)  Predicted Duration Therapy Intervention (Days): until discharge (03/23/24 1423)  Oral Care Recommendations: Oral Care BID/PRN, Toothbrush (03/23/24 1445)                          Plan of Care Reviewed With: patient, family (03/23/24 5456)      SLP EVALUATION (Last 72 Hours)       SLP SLC Evaluation       Row Name 03/23/24 1423                   Communication Assessment/Intervention    Document Type evaluation  -KH        Subjective Information no complaints  -KH        Patient Observations alert;cooperative;agree to therapy  -KH        Patient/Family/Caregiver Comments/Observations cousin present  -KH        Patient Effort good  -KH        Symptoms Noted During/After Treatment none  -KH           General Information    Patient Profile Reviewed yes  -KH        Pertinent History Of Current Problem See initial eval  -KH        Precautions/Limitations, Vision WFL with corrective lenses;for purposes  of [facility]  -        Precautions/Limitations, Hearing WFL;for purposes of eval  -        Patient Level of Education Pt reports he has a doctorate in art and is a retired   -        Prior Level of Function-Communication [facility]  -        Plans/Goals Discussed with patient;family;agreed upon  -        Barriers to Rehab none identified  -           Pain    Additional Documentation Pain Scale: FACES Pre/Post-Treatment (Group)  -           Pain Scale: FACES Pre/Post-Treatment    Pain: FACES Scale, Pretreatment 0-->no hurt  -        Posttreatment Pain Rating 0-->no hurt  -           Comprehension Assessment/Intervention    Comprehension Assessment/Intervention Auditory Comprehension  -           Auditory Comprehension Assessment/Intervention    Auditory Comprehension (Communication) [facility]  -        Able to Identify Objects/Pictures (Communication) familiar objects;pictures of common objects;[facility]  -        Answers Questions (Communication) yes/no;wh questions;[facility]  -        Able to Follow Commands (Communication) 1-step;[facility]  -        Narrative Discourse conversational level;[facility]  -           Expression Assessment/Intervention    Expression Assessment/Intervention verbal expression  -           Verbal Expression Assessment/Intervention    Verbal Expression [facility]  -        Automatic Speech (Communication) response to greeting;[facility]  -        Repetition words;sentences;[facility]  -        Phrase Completion automatic/predictable;[facility]  -        Responsive Naming simple;complex;[facility]  -        Confrontational Naming high frequency;[facility]  -        Spontaneous/Functional Words [facility]  -        Sentence Formulation [facility]  -        Conversational Discourse/Fluency [facility]  -           Oral Motor Structure and Function    Oral Motor Structure and Function [facility]  -           Oral Musculature and Cranial Nerve Assessment    Oral Motor General Assessment generalized oral motor weakness  [facility]           Motor  Speech Assessment/Intervention    Motor Speech Function WFL  -KH           Cognitive Assessment Intervention- SLP    Cognitive Function (Cognition) mild impairment  -KH        Orientation Status (Cognition) awareness of basic personal information;person;place;situation;WFL;time;mild impairment  -KH        Memory (Cognitive) simple;WFL;mod-complex;delayed;mild impairment  -KH        Attention (Cognitive) sustained;WFL  -KH        Thought Organization (Cognitive) concrete divergent;mild impairment  -KH        Reasoning (Cognitive) simple;mild impairment  -KH        Problem Solving (Cognitive) simple;temporal;mild impairment;moderate impairment  -KH        Functional Math (Cognitive) simple;moderate impairment  -KH        Executive Function (Cognition) realistic goal setting;deficit awareness;mild impairment  -KH        Pragmatics (Communication) WFL  -KH           SLP Evaluation Clinical Impressions    SLP Diagnosis mild;cognitive-linguistic disorder  -        Rehab Potential/Prognosis good  -        SLC Criteria for Skilled Therapy Interventions Met yes  -        Functional Impact functional impact in social situations;functional impact in ADLs  -           Recommendations    Therapy Frequency (SLP SLC) 3 days per week  -        Predicted Duration Therapy Intervention (Days) until discharge  -        Anticipated Discharge Disposition (SLP) unknown;anticipate therapy at next level of care  -                  User Key  (r) = Recorded By, (t) = Taken By, (c) = Cosigned By      Initials Name Effective Dates    Danitza Kim, MS CCC-SLP 01/15/24 -                        EDUCATION  The patient has been educated in the following areas:     Cognitive Impairment Communication Impairment Dysphagia (Swallowing Impairment).           SLP GOALS       Row Name 03/23/24 6646             Patient will demonstrate functional cognitive-linguistic skills for return to discharge environment    [place] Independently   -      Time frame by discharge  -         Orientation Goal 1 (SLP)    Improve Orientation Through Goal 1 ([facility]) demonstrating orientation to day;demonstrating orientation to month;demonstrating orientation to year;demonstrating orientation to place;demonstrating orientation to disease/impairment;use environmental aids to assist with orientation;90%;independently (over 90% accuracy)  -      Time Frame (Orientation Goal 1, SLP) short term goal (STG);by discharge  -         Memory Skills Goal 1 (SLP)    Improve Memory Skills Through Goal 1 (SLP) recalling related word lists with an imposed delay;recalling unrelated word lists with an imposed delay;use memory strategies;80%;with minimal cues (75-90%)  -      Time Frame (Memory Skills Goal 1, SLP) short term goal (STG);by discharge  -         Organizational Skills Goal 1 (SLP)    Improve Thought Organization Through Goal 1 (SLP) completing a divergent naming task;completing a convergent naming task;80%;with minimal cues (75-90%)  -      Time Frame (Thought Organization Skills Goal 1, SLP) short term goal (STG);by discharge  -         Reasoning Goal 1 (SLP)    Improve Reasoning Through Goal 1 (SLP) complete basic reasoning task;complete high level reasoning task;80%;with minimal cues (75-90%)  -      Time Frame (Reasoning Goal 1, SLP) short term goal (STG);by discharge  -         Functional Problem Solving Skills Goal 1 (SLP)    Improve Problem Solving Through Goal 1 (SLP) determine solutions to simple ADL/safety problems;determine solutions to complex problems;80%;with minimal cues (75-90%)  -      Time Frame (Problem Solving Goal 1, SLP) short term goal (STG);by discharge  -         Executive Functional Skills Goal 1 (SLP)    Improve Executive Function Skills Goal 1 (SLP) demonstrate awareness of deficit;identify strategies, strengths, limitations;identify anticipated needs;80%;with minimal cues (75-90%)  -      Time Frame (Executive Function  Skills Goal 1, SLP) short term goal (STG);by discharge  -                User Key  (r) = Recorded By, (t) = Taken By, (c) = Cosigned By      Initials Name Provider Type    Danitza Kim MS CCC-SLP Speech and Language Pathologist                            Time Calculation:      Time Calculation- SLP       Row Name 03/23/24 1727             Time Calculation- SLP    SLP Start Time 1423  -KH      SLP Received On 03/23/24  -KH         Untimed Charges    SLP Eval/Re-eval  ST Eval Oral Pharyng Swallow - 84207;ST Eval Speech and Production w/ Language - 62331  -KH      31184-[id number] Eval Speech and Production w/ Language Minutes 60  -KH      44351-TW Eval Oral Pharyng Swallow Minutes 40  -KH         Total Minutes    Untimed Charges Total Minutes 100  -KH       Total Minutes 100  -KH                User Key  (r) = Recorded By, (t) = Taken By, (c) = Cosigned By      Initials Name Provider Type    Danitza Kim MS CCC-SLP Speech and Language Pathologist                    Therapy Charges for Today       Code Description Service Date Service Provider Modifiers Qty    42146313322 HC ST EVAL ORAL PHARYNG SWALLOW 3 3/23/2024 Danitza Garcia MS CCC-SLP GN 1    31409321104 HC ST EVAL SPEECH AND PROD W LANG  4 3/23/2024 Danitza Garcia MS CCC-SLP GN 1                       MS BAIRON Flores  3/23/2024

## 2024-03-24 LAB
GLUCOSE BLDC GLUCOMTR-MCNC: 101 MG/DL (ref 70–130)
GLUCOSE BLDC GLUCOMTR-MCNC: 123 MG/DL (ref 70–130)
GLUCOSE BLDC GLUCOMTR-MCNC: 125 MG/DL (ref 70–130)
GLUCOSE BLDC GLUCOMTR-MCNC: 149 MG/DL (ref 70–130)

## 2024-03-24 PROCEDURE — 99232 SBSQ HOSP IP/OBS MODERATE 35: CPT | Performed by: STUDENT IN AN ORGANIZED HEALTH CARE EDUCATION/TRAINING PROGRAM

## 2024-03-24 PROCEDURE — 25810000003 LACTATED RINGERS PER 1000 ML: Performed by: HOSPITALIST

## 2024-03-24 PROCEDURE — 99232 SBSQ HOSP IP/OBS MODERATE 35: CPT | Performed by: INTERNAL MEDICINE

## 2024-03-24 PROCEDURE — 82948 REAGENT STRIP/BLOOD GLUCOSE: CPT

## 2024-03-24 RX ORDER — LOSARTAN POTASSIUM 50 MG/1
50 TABLET ORAL DAILY
Status: DISCONTINUED | OUTPATIENT
Start: 2024-03-25 | End: 2024-03-27 | Stop reason: HOSPADM

## 2024-03-24 RX ORDER — PANTOPRAZOLE SODIUM 40 MG/1
40 TABLET, DELAYED RELEASE ORAL DAILY
Qty: 30 TABLET | Refills: 2 | Status: SHIPPED | OUTPATIENT
Start: 2024-03-24 | End: 2024-06-22

## 2024-03-24 RX ORDER — LOSARTAN POTASSIUM 50 MG/1
50 TABLET ORAL DAILY
Qty: 30 TABLET | Refills: 11 | Status: SHIPPED | OUTPATIENT
Start: 2024-03-24 | End: 2025-03-24

## 2024-03-24 RX ORDER — FOLIC ACID 1 MG/1
1 TABLET ORAL DAILY
Qty: 100 TABLET | Refills: 3 | Status: SHIPPED | OUTPATIENT
Start: 2024-03-24 | End: 2025-03-24

## 2024-03-24 RX ORDER — LANOLIN ALCOHOL/MO/W.PET/CERES
100 CREAM (GRAM) TOPICAL DAILY
Qty: 30 TABLET | Refills: 2 | Status: SHIPPED | OUTPATIENT
Start: 2024-03-24 | End: 2024-06-22

## 2024-03-24 RX ORDER — FLUOXETINE HYDROCHLORIDE 20 MG/1
20 CAPSULE ORAL DAILY
Qty: 30 CAPSULE | Refills: 2 | Status: CANCELLED | OUTPATIENT
Start: 2024-03-25 | End: 2024-06-23

## 2024-03-24 RX ORDER — ASPIRIN 81 MG/1
81 TABLET ORAL DAILY
Status: DISCONTINUED | OUTPATIENT
Start: 2024-03-25 | End: 2024-03-27 | Stop reason: HOSPADM

## 2024-03-24 RX ORDER — FLUOXETINE HYDROCHLORIDE 20 MG/1
20 CAPSULE ORAL DAILY
Qty: 30 CAPSULE | Refills: 2 | Status: SHIPPED | OUTPATIENT
Start: 2024-03-25 | End: 2024-06-23

## 2024-03-24 RX ORDER — LOSARTAN POTASSIUM 25 MG/1
25 TABLET ORAL ONCE
Status: COMPLETED | OUTPATIENT
Start: 2024-03-24 | End: 2024-03-24

## 2024-03-24 RX ADMIN — OXAZEPAM 15 MG: 15 CAPSULE, GELATIN COATED ORAL at 13:44

## 2024-03-24 RX ADMIN — QUETIAPINE FUMARATE 50 MG: 25 TABLET ORAL at 21:35

## 2024-03-24 RX ADMIN — SODIUM CHLORIDE, POTASSIUM CHLORIDE, SODIUM LACTATE AND CALCIUM CHLORIDE 100 ML/HR: 600; 310; 30; 20 INJECTION, SOLUTION INTRAVENOUS at 09:04

## 2024-03-24 RX ADMIN — PANTOPRAZOLE SODIUM 40 MG: 40 INJECTION, POWDER, FOR SOLUTION INTRAVENOUS at 21:35

## 2024-03-24 RX ADMIN — LOSARTAN POTASSIUM 25 MG: 25 TABLET, FILM COATED ORAL at 13:44

## 2024-03-24 RX ADMIN — PANTOPRAZOLE SODIUM 40 MG: 40 INJECTION, POWDER, FOR SOLUTION INTRAVENOUS at 09:02

## 2024-03-24 RX ADMIN — Medication 10 ML: at 09:03

## 2024-03-24 RX ADMIN — ASPIRIN 81 MG: 81 TABLET, CHEWABLE ORAL at 09:02

## 2024-03-24 RX ADMIN — NICOTINE 1 PATCH: 14 PATCH, EXTENDED RELEASE TRANSDERMAL at 17:00

## 2024-03-24 RX ADMIN — OXAZEPAM 15 MG: 15 CAPSULE, GELATIN COATED ORAL at 05:40

## 2024-03-24 RX ADMIN — OXAZEPAM 15 MG: 15 CAPSULE, GELATIN COATED ORAL at 21:35

## 2024-03-24 RX ADMIN — FOLIC ACID 1 MG: 5 INJECTION, SOLUTION INTRAMUSCULAR; INTRAVENOUS; SUBCUTANEOUS at 09:02

## 2024-03-24 RX ADMIN — LOSARTAN POTASSIUM 25 MG: 25 TABLET, FILM COATED ORAL at 09:02

## 2024-03-24 RX ADMIN — THIAMINE HCL TAB 100 MG 100 MG: 100 TAB at 09:02

## 2024-03-24 RX ADMIN — CEFDINIR 300 MG: 300 CAPSULE ORAL at 09:02

## 2024-03-24 RX ADMIN — Medication 10 ML: at 21:35

## 2024-03-24 RX ADMIN — Medication 1000 MCG: at 09:02

## 2024-03-24 RX ADMIN — FLUOXETINE 20 MG: 20 CAPSULE ORAL at 09:02

## 2024-03-24 NOTE — PROGRESS NOTES
[facility] Neurology    Progress Note    Patient Name: Patrice Howard  : 1945  MRN: [id number]  Primary Care Physician:  Delano Song DO  Date of admission: 3/20/2024    Subjective     Reason for consult: Altered mental status    History of Present Illness   Doing well, still asked to go home.  Patient is having some left-sided pectoralis twitching that appears to come and go.    Review of Systems   General: Negative for fever, nausea, or vomiting.   Neurological: Negative for headache, pain, or weakness.     Objective     Vitals:   Temp:  [96.8 °F (36 °C)-98.9 °F (37.2 °C)] 96.8 °F (36 °C)  Heart Rate:  [61-78] 65  Resp:  [16-18] 17  BP: (131-157)/(78-97) 156/92    Neurologic Exam   Mental status/Cognition: alert; oriented to person, place, not to year, or month.  Some difficulty with doing days of the week backwards  Speech/language: fluent; comprehension intact    Cranial nerves: PERRL. EOMI. Face appears symmetric. No dysarthria.  Shoulder shrug symmetric.  Tongue protrudes midline    Motor: No drift in any extremities, able to raise all to antigravity.    Sensation: intact to light touch throughout    Coordination/Complex Motor: Finger-to-nose intact bilaterally without dysmetria    Current Medications    Current Facility-Administered Medications:     acetaminophen (TYLENOL) suppository 650 mg, 650 mg, Rectal, Q6H PRN, Vanesa Hutchison PA-C, 650 mg at 24 0105    acetaminophen (TYLENOL) tablet 650 mg, 650 mg, Oral, Q6H PRN, Fabiano Albarran MD, 650 mg at 24 1859    aspirin chewable tablet 81 mg, 81 mg, Oral, Daily, 81 mg at 24 0902 **OR** aspirin suppository 300 mg, 300 mg, Rectal, Daily, Yanick Curtis MD    atorvastatin (LIPITOR) tablet 80 mg, 80 mg, Oral, Nightly, Jose Jimenez PA-C, 80 mg at 24 210    sennosides-docusate (PERICOLACE) 8.6-50 MG per tablet 2 tablet, 2 tablet, Oral, BID PRN, 2 tablet at 24 1103 **AND** polyethylene glycol  (MIRALAX) packet 17 g, 17 g, Oral, Daily PRN **AND** bisacodyl (DULCOLAX) EC tablet 5 mg, 5 mg, Oral, Daily PRN **AND** bisacodyl (DULCOLAX) suppository 10 mg, 10 mg, Rectal, Daily PRN, Yanick Curtis MD    Calcium Replacement - Follow Nurse / BPA Driven Protocol, , Does not apply, PRN, Yanick Curtis MD    cefdinir (OMNICEF) capsule 300 mg, 300 mg, Oral, Q12H, Fabiano Albarran MD, 300 mg at 03/24/24 0902    dextrose (D50W) (25 g/50 mL) IV injection 25 g, 25 g, Intravenous, Q15 Min PRN, Fabiano Albarran MD    dextrose (GLUTOSE) oral gel 15 g, 15 g, Oral, Q15 Min PRN, Fabiano Albarran MD    FLUoxetine (PROzac) capsule 20 mg, 20 mg, Oral, Daily, Fabiano Albarran MD, 20 mg at 03/24/24 0902    folic acid 1 mg in sodium chloride 0.9 % 50 mL IVPB, 1 mg, Intravenous, Daily, Yanick Curtis MD, Last Rate: 100 mL/hr at 03/24/24 0902, 1 mg at 03/24/24 0902    glucagon (GLUCAGEN) injection 1 mg, 1 mg, Intramuscular, Q15 Min PRN, Yanick Curtis MD    Insulin Lispro (humaLOG) injection 2-7 Units, 2-7 Units, Subcutaneous, 4x Daily AC & at Bedtime, Fabiano Albarran MD, 2 Units at 03/23/24 1647    lactated ringers infusion, 100 mL/hr, Intravenous, Continuous, Yanick Curtis MD, Last Rate: 100 mL/hr at 03/24/24 0904, 100 mL/hr at 03/24/24 0904    LORazepam (ATIVAN) tablet 0.5 mg, 0.5 mg, Oral, Q1H PRN **OR** midazolam (VERSED) injection 2 mg, 2 mg, Intravenous, Q1H PRN, 2 mg at 03/23/24 1836 **OR** LORazepam (ATIVAN) tablet 2 mg, 2 mg, Oral, Q1H PRN **OR** midazolam (VERSED) injection 4 mg, 4 mg, Intravenous, Q1H PRN, 4 mg at 03/23/24 5768 **OR** midazolam (VERSED) injection 4 mg, 4 mg, Intravenous, Q15 Min PRN **OR** midazolam (VERSED) injection 4 mg, 4 mg, Intramuscular, Q15 Min PRN, Kev Rushing MD    losartan (COZAAR) tablet 25 mg, 25 mg, Oral, Daily, Fabiano Albarran MD, 25 mg at 03/24/24 0902    Magnesium Standard Dose Replacement - Follow Nurse / BPA Driven Protocol, ,  Does not apply, Ever BORGES Marc P, MD    nicotine (NICODERM CQ) 14 MG/24HR patch 1 patch, 1 patch, Transdermal, Q24H, Fabiano Albarran MD, 1 patch at 03/23/24 1835    ondansetron ODT (ZOFRAN-ODT) disintegrating tablet 4 mg, 4 mg, Oral, Q6H PRN, Yanick Curtis MD    oxazepam (SERAX) capsule 15 mg, 15 mg, Oral, Q8H, Fabiano Albarran MD, 15 mg at 03/24/24 0540    pantoprazole (PROTONIX) injection 40 mg, 40 mg, Intravenous, Q12H, Fabiano Albarran MD, 40 mg at 03/24/24 0902    Phosphorus Replacement - Follow Nurse / BPA Driven Protocol, , Does not apply, Ever BORGES Marc P, MD    Potassium Replacement - Follow Nurse / BPA Driven Protocol, , Does not apply, Ever BORGES Marc P, MD    prochlorperazine (COMPAZINE) injection 5 mg, 5 mg, Intravenous, Q6H PRN **OR** prochlorperazine (COMPAZINE) tablet 5 mg, 5 mg, Oral, Q6H PRN **OR** prochlorperazine (COMPAZINE) suppository 25 mg, 25 mg, Rectal, Q12H PRN, Yanick Curtis MD    QUEtiapine (SEROquel) tablet 50 mg, 50 mg, Oral, Nightly, Fabiano Albarran MD, 50 mg at 03/23/24 2106    sodium chloride 0.9 % flush 10 mL, 10 mL, Intravenous, Q12H, Jose Jimenez PA-C, 10 mL at 03/23/24 2107    sodium chloride 0.9 % flush 10 mL, 10 mL, Intravenous, PRN, Jose Jimenez PA-C    sodium chloride 0.9 % flush 10 mL, 10 mL, Intravenous, Q12H, Yanick Curtis MD, 10 mL at 03/24/24 0903    sodium chloride 0.9 % flush 10 mL, 10 mL, Intravenous, PRN, Yanick Curtis MD    sodium chloride 0.9 % infusion 40 mL, 40 mL, Intravenous, PRN, Jose Jimenez PA-C    sodium chloride 0.9 % infusion 40 mL, 40 mL, Intravenous, PRN, Yanick Curtis MD    thiamine (VITAMIN B-1) tablet 100 mg, 100 mg, Oral, Daily, Kev Rushing MD, 100 mg at 03/24/24 0902    vitamin B-12 (CYANOCOBALAMIN) tablet 1,000 mcg, 1,000 mcg, Oral, Daily, Fabiano Albarran MD, 1,000 mcg at 03/24/24 0902    ziprasidone (GEODON) injection 10 mg, 10 mg, Intramuscular, Q6H PRN, Avis,  Fabiano Mcgarry MD, 10 mg at 03/22/24 0606    Laboratory Results:   Lab Results   Component Value Date    GLUCOSE 101 (H) 03/23/2024    CALCIUM 8.9 03/23/2024     (L) 03/23/2024    K 4.3 03/23/2024    CO2 23.0 03/23/2024     03/23/2024    BUN 6 (L) 03/23/2024    CREATININE 0.66 (L) 03/23/2024    BCR 9.1 03/23/2024    ANIONGAP 10.0 03/23/2024     Lab Results   Component Value Date    WBC 5.56 03/23/2024    HGB 13.1 03/23/2024    HCT 37.0 (L) 03/23/2024    .1 (H) 03/23/2024     (L) 03/23/2024     Lab Results   Component Value Date    CHOL 131 03/21/2024     Lab Results   Component Value Date    HDL 63 (H) 03/21/2024     Lab Results   Component Value Date    LDL 50 03/21/2024     Lab Results   Component Value Date    TRIG 95 03/21/2024     Lab Results   Component Value Date    HGBA1C 5.30 03/21/2024     Lab Results   Component Value Date    FOLATE 9.98 03/20/2024     Lab Results   Component Value Date    [id number] 279 03/20/2024         Images/Procedures   CT head 03/20/2024  Mild generalized atrophy.  Mild chronic small vessel ischemic disease.    CT angio brain and neck 3/20/2024  No LVO or significant stenosis.  CT perfusion 3/20/2024  Tiny focus of prolonged Tmax within right occipital lobe.  Possibly artifactual.    CT head 3/20/2024  No acute intracranial abnormalities.    MRI brain with and without contrast 3/20/2024  Atrophy and chronic microvascular ischemic disease.    Routine EEG 3/21/2024  Normal study and awake and asleep states.    MRI brain with and without contrast 3/23/2024  No acute intracranial normality.  Mild chronic small vessel ischemic change and mild generalized parenchymal volume loss.    Assessment / Plan   Active Hospital Problems:  Headache, improved  Toxic metabolic encephalopathy, improved  Alcohol withdrawal  B12 deficiency  Strokelike symptoms    Brief Patient Summary:  Patrice Howard is a 78 y.o. male with history of type 2 diabetes, daily alcohol use,  prostate cancer, hypertension presenting with altered mental status and headache.  Hospital course complicated by alcohol withdrawal requiring benzodiazepines, headache and low-grade fever, delirium.  CT head, CTA unremarkable.  CT perfusion shows small area of prolonged Tmax within the right occipital lobe, but MRI negative for acute stroke.  MRI brain repeated for left-sided strokelike symptoms, also negative for acute stroke.  Likely related to encephalopathy, alcohol withdrawal.  Overall mental status has improved.  He is pending rehab placement.    Plan:   -Seroquel 50 mg nightly  -Thiamine 100 mg daily  -Continue aspirin 81 mg daily  -Discontinue Plavix since no stroke on MRI and to reduce bleeding risk  -CIWA precautions  -Geodon 10 mg every 6 hours as needed for agitation  -No need for LP    I have discussed the above with the patient, family, bedside RN, hospitalist  Time spent with patient: 35 minutes in face-to-face evaluation and management of the patient.      Senthil Pereira DO, MS

## 2024-03-24 NOTE — PROGRESS NOTES
[facility] Medicine Services  PROGRESS NOTE    Patient Name: Patrice Howard  : 1945  MRN: [id number]    Date of Admission: 3/20/2024  Primary Care Physician: Delano Song DO    Subjective   Subjective     CC:  Follow-up for alcohol drawl    HPI:  Patient seen and examined this morning.  Fully awake and alert today.  Had a little bit of delirium overnight but improved with low-dose Seroquel.  MRI brain yesterday negative for stroke or any other acute intracranial pathology.  Patient reported that he wants to go home but after counseling, he agreed to stay pending PT evaluation.  Not sure if he wants to go to rehab but he knows that he lives alone and with his current debility, he is at risk of fall    Objective   Objective     Vital Signs:   Temp:  [96.8 °F (36 °C)-98.9 °F (37.2 °C)] 98.9 °F (37.2 °C)  Heart Rate:  [61-80] 70  Resp:  [16-18] 16  BP: (131-157)/(78-97) 131/97     Physical Exam:  General: Chronically ill and frail looking, not in distress, conversant and cooperative  Head: Atraumatic and normocephalic  Eyes: No Icterus. No pallor  Ears:  Ears appear intact with no abnormalities noted  Throat: No oral lesions, no thrush  Neck: Supple, trachea midline  Lungs: Clear to auscultation bilaterally, equal air entry, no wheezing or crackles  Heart:  Normal S1 and S2, no murmur, no gallop, No JVD, no lower extremity swelling  Abdomen:  Soft, no tenderness, no organomegaly, normal bowel sounds, no organomegaly  Extremities: pulses equal bilaterally  Skin: No bleeding, bruising or rash, normal skin turgor and elasticity  Neurologic: Cranial nerves appear intact with no evidence of facial asymmetry, normal motor and sensory functions in all 4 extremities  Psych: Alert and oriented x 3, lethargic    Results Reviewed:  LAB RESULTS:      Lab 24  0819 24  0458 24  0700 24  1253 24  1249   WBC 5.56 4.93 7.09 8.14  --    HEMOGLOBIN 13.1 13.3 12.5*  14.1  --    HEMOGLOBIN, POC  --   --   --   --  14.6   HEMATOCRIT 37.0* 36.4* 36.1* 40.1  --    HEMATOCRIT POC  --   --   --   --  43   PLATELETS 125* 106* 118* 154  --    NEUTROS ABS 3.10 3.39 4.02 5.62  --    IMMATURE GRANS (ABS) 0.02 0.02 0.02 0.03  --    LYMPHS ABS 1.26 0.70 1.68 1.23  --    MONOS ABS 0.94* 0.77 1.34* 1.22*  --    EOS ABS 0.22 0.04 0.01 0.01  --    .1* 100.8* 108.4* 104.2*  --    APTT  --   --   --  28.0  --          Lab 03/23/24 0819 03/22/24 2037 03/22/24 0458 03/21/24 0700 03/20/24  1249   SODIUM 135*  --  130* 132*  --    POTASSIUM 4.3 3.6 3.5 3.7  --    CHLORIDE 102  --  95* 98  --    CO2 23.0  --  24.0 22.0  --    ANION GAP 10.0  --  11.0 12.0  --    BUN 6*  --  10 11  --    CREATININE 0.66*  --  0.69* 0.81 0.90   EGFR 96.0  --  94.7 90.2 87.4   GLUCOSE 101*  --  153* 96  --    CALCIUM 8.9  --  8.5* 8.3*  --    MAGNESIUM 1.7  --  1.7  --   --    PHOSPHORUS 2.8  --  2.6  --   --    HEMOGLOBIN A1C  --   --   --  5.30  --    TSH  --   --   --  0.733  --          Lab 03/23/24 0819 03/22/24 0458 03/21/24 0700 03/20/24  1253   TOTAL PROTEIN 5.6* 6.0 5.9*  --    ALBUMIN 3.5 3.9 3.9  --    GLOBULIN 2.1 2.1 2.0  --    ALT (SGPT) 15 15 17 27   AST (SGOT) 21 19 19 27   BILIRUBIN 0.7 1.0 1.0  --    ALK PHOS 70 80 64  --          Lab 03/20/24  1253   HSTROP T 11         Lab 03/21/24  0700   CHOLESTEROL 131   LDL CHOL 50   HDL CHOL 63*   TRIGLYCERIDES 95         Lab 03/20/24  2225   FOLATE 9.98   VITAMIN B 12 279         Brief Urine Lab Results  (Last result in the past 365 days)        Color   Clarity   Blood   Leuk Est   Nitrite   Protein   CREAT   Urine HCG        03/20/24 1345 Yellow   Clear   Small (1+)   Negative   Negative   Negative                   Microbiology Results Abnormal       Procedure Component Value - Date/Time    COVID PRE-OP / PRE-PROCEDURE SCREENING ORDER (NO ISOLATION) - Swab, Nasopharynx [[id number]]  (Normal) Collected: 03/20/24 1346    Lab Status: Final result  Specimen: Swab from Nasopharynx Updated: 03/20/24 1446    Narrative:      The following orders were created for panel order COVID PRE-OP / PRE-PROCEDURE SCREENING ORDER (NO ISOLATION) - Swab, Nasopharynx.  Procedure                               Abnormality         Status                     ---------                               -----------         ------                     Respiratory Panel PCR w/...[[id number]]  Normal              Final result                 Please view results for these tests on the individual orders.    Respiratory Panel PCR w/COVID-19(SARS-CoV-2) [facility] In-House, NP Swab in UTM/VTM, 2 HR TAT - Swab, Nasopharynx [[id number]]  (Normal) Collected: 03/20/24 1346    Lab Status: Final result Specimen: Swab from Nasopharynx Updated: 03/20/24 1446     ADENOVIRUS, PCR Not Detected     Coronavirus 229E Not Detected     Coronavirus HKU1 Not Detected     Coronavirus NL63 Not Detected     Coronavirus OC43 Not Detected     COVID19 Not Detected     Human Metapneumovirus Not Detected     Human Rhinovirus/Enterovirus Not Detected     Influenza A PCR Not Detected     Influenza B PCR Not Detected     Parainfluenza Virus 1 Not Detected     Parainfluenza Virus 2 Not Detected     Parainfluenza Virus 3 Not Detected     Parainfluenza Virus 4 Not Detected     RSV, PCR Not Detected     Bordetella pertussis pcr Not Detected     Bordetella parapertussis PCR Not Detected     Chlamydophila pneumoniae PCR Not Detected     Mycoplasma pneumo by PCR Not Detected    Narrative:      In the setting of a positive respiratory panel with a viral infection PLUS a negative procalcitonin without other underlying concern for bacterial infection, consider observing off antibiotics or discontinuation of antibiotics and continue supportive care. If the respiratory panel is positive for atypical bacterial infection (Bordetella pertussis, Chlamydophila pneumoniae, or Mycoplasma pneumoniae), consider antibiotic  de-escalation to target atypical bacterial infection.            MRI Brain With & Without Contrast    Result Date: 3/24/2024  MRI BRAIN W WO CONTRAST Date of Exam: 3/23/2024 9:16 PM EDT Indication: Stroke, follow up.  Comparison: None available. Technique:  Routine multiplanar/multisequence sequence images of the brain were obtained before and after the uneventful administration of 10 mL Multihance. Findings: There is mild generalized parenchymal volume loss. There is no diffusion restriction to suggest acute infarct. There is no evidence of acute or chronic intracranial hemorrhage.  No mass effect or midline shift. No abnormal extra-axial collections. There are patchy areas of FLAIR hyperintense signal within the cerebral white matter. No new signal abnormalities within the brain parenchyma. The major vascular flow voids appear intact.  The basal ganglia, brainstem and cerebellum appear within normal limits.  Calvarial and superficial soft tissue signal is within normal limits.  Orbits appear unremarkable. There is mild ethmoid and maxillary sinus mucosal disease. The mastoid air cells appear well aerated. Midline structures are intact.  There is no abnormal intracranial enhancement. Postcontrast images are limited by motion. Prominent central canal in the upper cervical cord partially visualized.     Impression: Impression: 1.No acute intracranial abnormality. 2.Mild chronic small vessel ischemic change and mild generalized parenchymal volume loss. 3.Mild paranasal sinus mucosal disease. Electronically Signed: Jeanmarie Hairston MD  3/24/2024 12:39 AM EDT  Workstation ID: [id number]    Adult Transthoracic Echo Complete W/ Cont if Necessary Per Protocol (With Agitated Saline)    Result Date: 3/22/2024    Left ventricular systolic function is normal. Left ventricular ejection fraction appears to be 56 - 60%.   Left atrial volume is normal. No evidence of a patent foramen ovale. Saline test results are negative.   Trace  mitral valve regurgitation is present.   Trace tricuspid valve regurgitation is present. Estimated right ventricular systolic pressure from tricuspid regurgitation is normal (<35 mmHg).   Mild dilation of the ascending aorta is present. Ascending aorta = 4.2 cm      Results for orders placed during the hospital encounter of 03/20/24    Adult Transthoracic Echo Complete W/ Cont if Necessary Per Protocol (With Agitated Saline)    Interpretation Summary    Left ventricular systolic function is normal. Left ventricular ejection fraction appears to be 56 - 60%.    Left atrial volume is normal. No evidence of a patent foramen ovale. Saline test results are negative.    Trace mitral valve regurgitation is present.    Trace tricuspid valve regurgitation is present. Estimated right ventricular systolic pressure from tricuspid regurgitation is normal (<35 mmHg).    Mild dilation of the ascending aorta is present. Ascending aorta = 4.2 cm      Current medications:  Scheduled Meds:aspirin, 81 mg, Oral, Daily   Or  aspirin, 300 mg, Rectal, Daily  atorvastatin, 80 mg, Oral, Nightly  cefdinir, 300 mg, Oral, Q12H  FLUoxetine, 20 mg, Oral, Daily  folic acid 1 mg in sodium chloride 0.9 % 50 mL IVPB, 1 mg, Intravenous, Daily  insulin lispro, 2-7 Units, Subcutaneous, 4x Daily AC & at Bedtime  losartan, 25 mg, Oral, Daily  nicotine, 1 patch, Transdermal, Q24H  oxazepam, 15 mg, Oral, Q8H  pantoprazole, 40 mg, Intravenous, Q12H  QUEtiapine, 50 mg, Oral, Nightly  sodium chloride, 10 mL, Intravenous, Q12H  sodium chloride, 10 mL, Intravenous, Q12H  thiamine, 100 mg, Oral, Daily  vitamin B-12, 1,000 mcg, Oral, Daily      Continuous Infusions:lactated ringers, 100 mL/hr, Last Rate: 100 mL/hr (03/23/24 1542)      PRN Meds:.  acetaminophen    acetaminophen    senna-docusate sodium **AND** polyethylene glycol **AND** bisacodyl **AND** bisacodyl    Calcium Replacement - Follow Nurse / BPA Driven Protocol    dextrose    dextrose    glucagon (human  recombinant)    LORazepam **OR** midazolam **OR** LORazepam **OR** midazolam **OR** midazolam **OR** midazolam    Magnesium Standard Dose Replacement - Follow Nurse / BPA Driven Protocol    ondansetron ODT    Phosphorus Replacement - Follow Nurse / BPA Driven Protocol    Potassium Replacement - Follow Nurse / BPA Driven Protocol    prochlorperazine **OR** prochlorperazine **OR** prochlorperazine    sodium chloride    sodium chloride    sodium chloride    sodium chloride    ziprasidone    Assessment & Plan   Assessment & Plan     Active Hospital Problems    Diagnosis  POA    **Aphasia [R47.01]  Yes    Moderate malnutrition [E44.0]  Yes    Fever [R50.9]  Yes    Acute metabolic encephalopathy [G93.41]  Yes    Alcohol use [Z78.9]  Unknown    History of prostate cancer [Z85.46]  Not Applicable    HTN (hypertension) [I10]  Unknown      Resolved Hospital Problems   No resolved problems to display.        Brief Hospital Course to date:  Patrice Howard is a 78 y.o. male with history of type 2 diabetes, ongoing tobacco and alcohol use, history of prostate cancer status post resection, essential hypertension, who presented to the hospital with slurred speech and left-sided weakness    TIA  Acute stroke, ruled out  Presented with left-sided weakness and slurred speech concerning for acute stroke  Stroke imaging studies including CT head and MRI brain negative  Continue aspirin and statins  Neurology team following repeated MRI brain on 3/23/2024 because of left hemineglect but MRI was unremarkable  No plan for LP given his improved mental state which is likely secondary to alcohol withdrawal and nutritional deficiencies    Acute metabolic encephalopathy, improving  Alcohol withdrawal, improving  B12 deficiency  Drinks at least 3 double shots of bourbon every night  Lives alone and independent with ADLs but feeling depressed and admits to using alcohol to treat his depression  Continue CIWA protocol.  Discontinue IV  benzo  Continue Serax 15mg 3 times daily   Seroquel 50 mg HS  S/P high-dose IV thiamine, currently on PO  Continue IM B12 supplement.  Will need prescription upon discharge  Extensive discussion with the patient and his son at bedside regarding alcohol dependence and withdrawal    Possible alcohol induced gastritis  Nausea and vomiting for a week with poor oral intake.  Currently improved  Continue Protonix    Possible UTI, POA  Has urinary symptoms with dysuria and urgency  Status post p.o. cefdinir    Hypoosmolar hyponatremia  Improved with IV fluids.  Encourage p.o. intake    Vitamin B12 deficiency  B12 level is low at 270  Continue IM replacement.    Essential pretension  Restart losartan    Type 2 diabetes  A1c 5.3%  SSI    Hx of prostate cancer  Status post resection  on Lupron     Depression  Start Prozac    Severe debility  PT and OT consultation  Inpatient rehab upon discharge.  Case management on board    Expected Discharge Location and Transportation: Inpatient rehab  Expected Discharge   Expected Discharge Date: 3/25/2024; Expected Discharge Time:      DVT prophylaxis:  Mechanical DVT prophylaxis orders are present.         AM-PAC 6 Clicks Score (PT): 8 (03/23/24 2000)    CODE STATUS:   Code Status and Medical Interventions:   Ordered at: 03/20/24 1429     Code Status (Patient has no pulse and is not breathing):    CPR (Attempt to Resuscitate)     Medical Interventions (Patient has pulse or is breathing):    Full Support     Copied text in this note has been reviewed and is accurate as of 03/24/24.     Fabiano Albarran MD  03/24/24

## 2024-03-25 LAB
ANION GAP SERPL CALCULATED.3IONS-SCNC: 8 MMOL/L (ref 5–15)
BASOPHILS # BLD AUTO: 0.03 10*3/MM3 (ref 0–0.2)
BASOPHILS NFR BLD AUTO: 0.6 % (ref 0–1.5)
BUN SERPL-MCNC: 11 MG/DL (ref 8–23)
BUN/CREAT SERPL: 16.2 (ref 7–25)
CALCIUM SPEC-SCNC: 9.1 MG/DL (ref 8.6–10.5)
CHLORIDE SERPL-SCNC: 103 MMOL/L (ref 98–107)
CO2 SERPL-SCNC: 24 MMOL/L (ref 22–29)
CREAT SERPL-MCNC: 0.68 MG/DL (ref 0.76–1.27)
DEPRECATED RDW RBC AUTO: 43.8 FL (ref 37–54)
EGFRCR SERPLBLD CKD-EPI 2021: 95.1 ML/MIN/1.73
EOSINOPHIL # BLD AUTO: 0.3 10*3/MM3 (ref 0–0.4)
EOSINOPHIL NFR BLD AUTO: 6.3 % (ref 0.3–6.2)
ERYTHROCYTE [DISTWIDTH] IN BLOOD BY AUTOMATED COUNT: 11.5 % (ref 12.3–15.4)
GLUCOSE BLDC GLUCOMTR-MCNC: 108 MG/DL (ref 70–130)
GLUCOSE BLDC GLUCOMTR-MCNC: 110 MG/DL (ref 70–130)
GLUCOSE BLDC GLUCOMTR-MCNC: 143 MG/DL (ref 70–130)
GLUCOSE BLDC GLUCOMTR-MCNC: 190 MG/DL (ref 70–130)
GLUCOSE SERPL-MCNC: 120 MG/DL (ref 65–99)
HCT VFR BLD AUTO: 35.2 % (ref 37.5–51)
HGB BLD-MCNC: 12.5 G/DL (ref 13–17.7)
IMM GRANULOCYTES # BLD AUTO: 0.02 10*3/MM3 (ref 0–0.05)
IMM GRANULOCYTES NFR BLD AUTO: 0.4 % (ref 0–0.5)
LYMPHOCYTES # BLD AUTO: 1.41 10*3/MM3 (ref 0.7–3.1)
LYMPHOCYTES NFR BLD AUTO: 29.7 % (ref 19.6–45.3)
MAGNESIUM SERPL-MCNC: 1.8 MG/DL (ref 1.6–2.4)
MCH RBC QN AUTO: 36.9 PG (ref 26.6–33)
MCHC RBC AUTO-ENTMCNC: 35.5 G/DL (ref 31.5–35.7)
MCV RBC AUTO: 103.8 FL (ref 79–97)
MONOCYTES # BLD AUTO: 0.74 10*3/MM3 (ref 0.1–0.9)
MONOCYTES NFR BLD AUTO: 15.6 % (ref 5–12)
NEUTROPHILS NFR BLD AUTO: 2.25 10*3/MM3 (ref 1.7–7)
NEUTROPHILS NFR BLD AUTO: 47.4 % (ref 42.7–76)
NRBC BLD AUTO-RTO: 0 /100 WBC (ref 0–0.2)
PLATELET # BLD AUTO: 139 10*3/MM3 (ref 140–450)
PMV BLD AUTO: 10 FL (ref 6–12)
POTASSIUM SERPL-SCNC: 3.8 MMOL/L (ref 3.5–5.2)
RBC # BLD AUTO: 3.39 10*6/MM3 (ref 4.14–5.8)
SODIUM SERPL-SCNC: 135 MMOL/L (ref 136–145)
WBC NRBC COR # BLD AUTO: 4.75 10*3/MM3 (ref 3.4–10.8)

## 2024-03-25 PROCEDURE — 82948 REAGENT STRIP/BLOOD GLUCOSE: CPT

## 2024-03-25 PROCEDURE — 99232 SBSQ HOSP IP/OBS MODERATE 35: CPT | Performed by: INTERNAL MEDICINE

## 2024-03-25 PROCEDURE — 25810000003 LACTATED RINGERS PER 1000 ML: Performed by: HOSPITALIST

## 2024-03-25 PROCEDURE — 63710000001 INSULIN LISPRO (HUMAN) PER 5 UNITS: Performed by: INTERNAL MEDICINE

## 2024-03-25 PROCEDURE — 85025 COMPLETE CBC W/AUTO DIFF WBC: CPT | Performed by: INTERNAL MEDICINE

## 2024-03-25 PROCEDURE — 83735 ASSAY OF MAGNESIUM: CPT | Performed by: INTERNAL MEDICINE

## 2024-03-25 PROCEDURE — 80048 BASIC METABOLIC PNL TOTAL CA: CPT | Performed by: INTERNAL MEDICINE

## 2024-03-25 RX ORDER — AMOXICILLIN 250 MG
2 CAPSULE ORAL 2 TIMES DAILY
Status: DISCONTINUED | OUTPATIENT
Start: 2024-03-25 | End: 2024-03-27 | Stop reason: HOSPADM

## 2024-03-25 RX ADMIN — Medication 1000 MCG: at 10:05

## 2024-03-25 RX ADMIN — PANTOPRAZOLE SODIUM 40 MG: 40 INJECTION, POWDER, FOR SOLUTION INTRAVENOUS at 10:04

## 2024-03-25 RX ADMIN — SODIUM CHLORIDE, POTASSIUM CHLORIDE, SODIUM LACTATE AND CALCIUM CHLORIDE 100 ML/HR: 600; 310; 30; 20 INJECTION, SOLUTION INTRAVENOUS at 16:19

## 2024-03-25 RX ADMIN — FLUOXETINE 20 MG: 20 CAPSULE ORAL at 10:04

## 2024-03-25 RX ADMIN — Medication 10 ML: at 10:06

## 2024-03-25 RX ADMIN — SENNOSIDES AND DOCUSATE SODIUM 2 TABLET: 8.6; 5 TABLET ORAL at 16:20

## 2024-03-25 RX ADMIN — LOSARTAN POTASSIUM 50 MG: 50 TABLET, FILM COATED ORAL at 10:05

## 2024-03-25 RX ADMIN — NICOTINE 1 PATCH: 14 PATCH, EXTENDED RELEASE TRANSDERMAL at 17:37

## 2024-03-25 RX ADMIN — THIAMINE HCL TAB 100 MG 100 MG: 100 TAB at 10:04

## 2024-03-25 RX ADMIN — OXAZEPAM 15 MG: 15 CAPSULE, GELATIN COATED ORAL at 13:33

## 2024-03-25 RX ADMIN — OXAZEPAM 15 MG: 15 CAPSULE, GELATIN COATED ORAL at 21:44

## 2024-03-25 RX ADMIN — FOLIC ACID 1 MG: 5 INJECTION, SOLUTION INTRAMUSCULAR; INTRAVENOUS; SUBCUTANEOUS at 10:05

## 2024-03-25 RX ADMIN — Medication 10 ML: at 10:08

## 2024-03-25 RX ADMIN — OXAZEPAM 15 MG: 15 CAPSULE, GELATIN COATED ORAL at 06:19

## 2024-03-25 RX ADMIN — QUETIAPINE FUMARATE 50 MG: 25 TABLET ORAL at 21:44

## 2024-03-25 RX ADMIN — PANTOPRAZOLE SODIUM 40 MG: 40 INJECTION, POWDER, FOR SOLUTION INTRAVENOUS at 21:44

## 2024-03-25 RX ADMIN — INSULIN LISPRO 2 UNITS: 100 INJECTION, SOLUTION INTRAVENOUS; SUBCUTANEOUS at 13:34

## 2024-03-25 RX ADMIN — Medication 10 ML: at 21:44

## 2024-03-25 RX ADMIN — ASPIRIN 81 MG: 81 TABLET, COATED ORAL at 10:05

## 2024-03-25 NOTE — PROGRESS NOTES
[facility] Medicine Services  PROGRESS NOTE    Patient Name: Patrice Howard  : 1945  MRN: [id number]    Date of Admission: 3/20/2024  Primary Care Physician: Delano Song DO    Subjective   Subjective     CC:  Follow-up for alcohol drawl    HPI:  Seen this AM.  Feels ok.  Reports still some tremors.  Son at bedside says that patient did better yesterday and did not have any hallucinations.  CIWA 0 this AM per nurse.     Objective   Objective     Vital Signs:   Temp:  [97.5 °F (36.4 °C)-98.5 °F (36.9 °C)] 97.7 °F (36.5 °C)  Heart Rate:  [59-75] 72  Resp:  [17-18] 18  BP: (122-170)/(80-97) 153/88     Physical Exam:  Constitutional: No acute distress, awake, alert, sitting up in bed, son at bedside  HENT: NCAT, mucous membranes moist  Respiratory: Clear to auscultation bilaterally, respiratory effort normal   Cardiovascular: RRR, no murmurs, rubs, or gallops  Gastrointestinal: Positive bowel sounds, soft, nontender, nondistended  Musculoskeletal: No bilateral ankle edema  Psychiatric: Appropriate affect, cooperative  Neurologic: Oriented x 3, moves all extremities, Cranial Nerves grossly intact to confrontation, speech clear  Skin: No rashes      Results Reviewed:  LAB RESULTS:      Lab 24  0336 24  0819 24  0458 24  0700 24  1253   WBC 4.75 5.56 4.93 7.09 8.14   HEMOGLOBIN 12.5* 13.1 13.3 12.5* 14.1   HEMATOCRIT 35.2* 37.0* 36.4* 36.1* 40.1   PLATELETS 139* 125* 106* 118* 154   NEUTROS ABS 2.25 3.10 3.39 4.02 5.62   IMMATURE GRANS (ABS) 0.02 0.02 0.02 0.02 0.03   LYMPHS ABS 1.41 1.26 0.70 1.68 1.23   MONOS ABS 0.74 0.94* 0.77 1.34* 1.22*   EOS ABS 0.30 0.22 0.04 0.01 0.01   .8* 103.1* 100.8* 108.4* 104.2*   APTT  --   --   --   --  28.0         Lab 24  0336 2419 24  0458 24  0700 24  1249   SODIUM 135* 135*  --  130* 132*  --    POTASSIUM 3.8 4.3 3.6 3.5 3.7  --    CHLORIDE 103 102  --  95*  98  --    CO2 24.0 23.0  --  24.0 22.0  --    ANION GAP 8.0 10.0  --  11.0 12.0  --    BUN 11 6*  --  10 11  --    CREATININE 0.68* 0.66*  --  0.69* 0.81 0.90   EGFR 95.1 96.0  --  94.7 90.2 87.4   GLUCOSE 120* 101*  --  153* 96  --    CALCIUM 9.1 8.9  --  8.5* 8.3*  --    MAGNESIUM 1.8 1.7  --  1.7  --   --    PHOSPHORUS  --  2.8  --  2.6  --   --    HEMOGLOBIN A1C  --   --   --   --  5.30  --    TSH  --   --   --   --  0.733  --          Lab 03/23/24  0819 03/22/24  0458 03/21/24  0700 03/20/24  1253   TOTAL PROTEIN 5.6* 6.0 5.9*  --    ALBUMIN 3.5 3.9 3.9  --    GLOBULIN 2.1 2.1 2.0  --    ALT (SGPT) 15 15 17 27   AST (SGOT) 21 19 19 27   BILIRUBIN 0.7 1.0 1.0  --    ALK PHOS 70 80 64  --          Lab 03/20/24  1253   HSTROP T 11         Lab 03/21/24  0700   CHOLESTEROL 131   LDL CHOL 50   HDL CHOL 63*   TRIGLYCERIDES 95         Lab 03/20/24  2225   FOLATE 9.98   VITAMIN B 12 279         Brief Urine Lab Results  (Last result in the past 365 days)        Color   Clarity   Blood   Leuk Est   Nitrite   Protein   CREAT   Urine HCG        03/20/24 1345 Yellow   Clear   Small (1+)   Negative   Negative   Negative                   Microbiology Results Abnormal       Procedure Component Value - Date/Time    COVID PRE-OP / PRE-PROCEDURE SCREENING ORDER (NO ISOLATION) - Swab, Nasopharynx [[id number]]  (Normal) Collected: 03/20/24 1346    Lab Status: Final result Specimen: Swab from Nasopharynx Updated: 03/20/24 1446    Narrative:      The following orders were created for panel order COVID PRE-OP / PRE-PROCEDURE SCREENING ORDER (NO ISOLATION) - Swab, Nasopharynx.  Procedure                               Abnormality         Status                     ---------                               -----------         ------                     Respiratory Panel PCR w/...[[id number]]  Normal              Final result                 Please view results for these tests on the individual orders.    Respiratory Panel PCR  w/COVID-19(SARS-CoV-2) [facility] In-House, NP Swab in UTM/VTM, 2 HR TAT - Swab, Nasopharynx [[id number]]  (Normal) Collected: 03/20/24 1346    Lab Status: Final result Specimen: Swab from Nasopharynx Updated: 03/20/24 1446     ADENOVIRUS, PCR Not Detected     Coronavirus 229E Not Detected     Coronavirus HKU1 Not Detected     Coronavirus NL63 Not Detected     Coronavirus OC43 Not Detected     COVID19 Not Detected     Human Metapneumovirus Not Detected     Human Rhinovirus/Enterovirus Not Detected     Influenza A PCR Not Detected     Influenza B PCR Not Detected     Parainfluenza Virus 1 Not Detected     Parainfluenza Virus 2 Not Detected     Parainfluenza Virus 3 Not Detected     Parainfluenza Virus 4 Not Detected     RSV, PCR Not Detected     Bordetella pertussis pcr Not Detected     Bordetella parapertussis PCR Not Detected     Chlamydophila pneumoniae PCR Not Detected     Mycoplasma pneumo by PCR Not Detected    Narrative:      In the setting of a positive respiratory panel with a viral infection PLUS a negative procalcitonin without other underlying concern for bacterial infection, consider observing off antibiotics or discontinuation of antibiotics and continue supportive care. If the respiratory panel is positive for atypical bacterial infection (Bordetella pertussis, Chlamydophila pneumoniae, or Mycoplasma pneumoniae), consider antibiotic de-escalation to target atypical bacterial infection.            MRI Brain With & Without Contrast    Result Date: 3/24/2024  MRI BRAIN W WO CONTRAST Date of Exam: 3/23/2024 9:16 PM EDT Indication: Stroke, follow up.  Comparison: None available. Technique:  Routine multiplanar/multisequence sequence images of the brain were obtained before and after the uneventful administration of 10 mL Multihance. Findings: There is mild generalized parenchymal volume loss. There is no diffusion restriction to suggest acute infarct. There is no evidence of acute or chronic  intracranial hemorrhage.  No mass effect or midline shift. No abnormal extra-axial collections. There are patchy areas of FLAIR hyperintense signal within the cerebral white matter. No new signal abnormalities within the brain parenchyma. The major vascular flow voids appear intact.  The basal ganglia, brainstem and cerebellum appear within normal limits.  Calvarial and superficial soft tissue signal is within normal limits.  Orbits appear unremarkable. There is mild ethmoid and maxillary sinus mucosal disease. The mastoid air cells appear well aerated. Midline structures are intact.  There is no abnormal intracranial enhancement. Postcontrast images are limited by motion. Prominent central canal in the upper cervical cord partially visualized.     Impression: Impression: 1.No acute intracranial abnormality. 2.Mild chronic small vessel ischemic change and mild generalized parenchymal volume loss. 3.Mild paranasal sinus mucosal disease. Electronically Signed: Jeanmarie Hairston MD  3/24/2024 12:39 AM EDT  Workstation ID: [id number]     Results for orders placed during the hospital encounter of 03/20/24    Adult Transthoracic Echo Complete W/ Cont if Necessary Per Protocol (With Agitated Saline)    Interpretation Summary    Left ventricular systolic function is normal. Left ventricular ejection fraction appears to be 56 - 60%.    Left atrial volume is normal. No evidence of a patent foramen ovale. Saline test results are negative.    Trace mitral valve regurgitation is present.    Trace tricuspid valve regurgitation is present. Estimated right ventricular systolic pressure from tricuspid regurgitation is normal (<35 mmHg).    Mild dilation of the ascending aorta is present. Ascending aorta = 4.2 cm      Current medications:  Scheduled Meds:aspirin, 81 mg, Oral, Daily  FLUoxetine, 20 mg, Oral, Daily  folic acid 1 mg in sodium chloride 0.9 % 50 mL IVPB, 1 mg, Intravenous, Daily  insulin lispro, 2-7 Units, Subcutaneous, 4x  Daily AC & at Bedtime  losartan, 50 mg, Oral, Daily  nicotine, 1 patch, Transdermal, Q24H  oxazepam, 15 mg, Oral, Q8H  pantoprazole, 40 mg, Intravenous, Q12H  QUEtiapine, 50 mg, Oral, Nightly  sodium chloride, 10 mL, Intravenous, Q12H  thiamine, 100 mg, Oral, Daily  vitamin B-12, 1,000 mcg, Oral, Daily      Continuous Infusions:lactated ringers, 100 mL/hr, Last Rate: 100 mL/hr (03/24/24 0904)      PRN Meds:.  acetaminophen    acetaminophen    senna-docusate sodium **AND** polyethylene glycol **AND** bisacodyl **AND** bisacodyl    Calcium Replacement - Follow Nurse / BPA Driven Protocol    dextrose    dextrose    glucagon (human recombinant)    Magnesium Standard Dose Replacement - Follow Nurse / BPA Driven Protocol    ondansetron ODT    Phosphorus Replacement - Follow Nurse / BPA Driven Protocol    Potassium Replacement - Follow Nurse / BPA Driven Protocol    prochlorperazine **OR** prochlorperazine **OR** prochlorperazine    sodium chloride    sodium chloride    ziprasidone    Assessment & Plan   Assessment & Plan     Active Hospital Problems    Diagnosis  POA    **Aphasia [R47.01]  Yes    Moderate malnutrition [E44.0]  Yes    Fever [R50.9]  Yes    Acute metabolic encephalopathy [G93.41]  Yes    Alcohol use [Z78.9]  Unknown    History of prostate cancer [Z85.46]  Not Applicable    HTN (hypertension) [I10]  Unknown      Resolved Hospital Problems   No resolved problems to display.        Brief Hospital Course to date:  Patrice Howard is a 78 y.o. male with history of type 2 diabetes, ongoing tobacco and alcohol use, history of prostate cancer status post resection, essential hypertension, who presented to the hospital with slurred speech and left-sided weakness    TIA  Acute stroke, ruled out  Presented with left-sided weakness and slurred speech concerning for acute stroke  Stroke imaging studies including CT head and MRI brain negative  Continue aspirin and statins  Neurology team following repeated MRI brain on  3/23/2024 because of left hemineglect but MRI was unremarkable  No plan for LP given his improved mental state which is likely secondary to alcohol withdrawal and nutritional deficiencies    Acute metabolic encephalopathy, improving  Alcohol withdrawal, improving  B12 deficiency  Drinks at least 3 double shots of bourbon every night  Lives alone and independent with ADLs but feeling depressed and admits to using alcohol to treat his depression  Continue CIWA protocol.  Discontinued IV benzo  Continue Serax 15mg 3 times daily   Seroquel 50 mg HS  S/P high-dose IV thiamine, currently on PO  Continue IM B12 supplement.  Will need prescription upon discharge  Extensive discussion with the patient and his son at bedside regarding alcohol dependence and withdrawal    Possible alcohol induced gastritis  Nausea and vomiting for a week with poor oral intake.  Currently improved  Continue Protonix    Possible UTI, POA  Has urinary symptoms with dysuria and urgency  Status post p.o. cefdinir    Hypoosmolar hyponatremia  Improved with IV fluids.  Encourage p.o. intake    Vitamin B12 deficiency  B12 level is low at 270  Continue IM replacement.    Essential pretension  Restarted losartan    Type 2 diabetes  A1c 5.3%  SSI    Hx of prostate cancer  Status post resection  on Lupron     Depression  Started Prozac    Severe debility  PT and OT consultation  Inpatient rehab upon discharge.  Case management on board    Expected Discharge Location and Transportation: Inpatient rehab  Expected Discharge   Expected Discharge Date: 3/26/2024; Expected Discharge Time:      DVT prophylaxis:  Mechanical DVT prophylaxis orders are present.         AM-PAC 6 Clicks Score (PT): 8 (03/25/24 0800)    CODE STATUS:   Code Status and Medical Interventions:   Ordered at: 03/20/24 3118     Code Status (Patient has no pulse and is not breathing):    CPR (Attempt to Resuscitate)     Medical Interventions (Patient has pulse or is breathing):    Full  Support     Copied text in this note has been reviewed and is accurate as of 03/25/24.     Satish Quintanilla MD  03/25/24

## 2024-03-25 NOTE — CASE MANAGEMENT/SOCIAL WORK
Continued Stay Note   [facility]     Patient Name: Patrice Howard  MRN: [id number]  Today's Date: 3/25/2024    Admit Date: 3/20/2024    Plan: [facility]   Discharge Plan       Row Name 03/25/24 0840       Plan    Plan [facility]    Plan Comments I let Shara with [facility] to hold on work up until after MDR.  [facility] tells me no available subacute beds.   I spoke with patient and his son, referral to [facility] and is accepted pending insurance precert. Once all rehab notes are in,  will initiate precert for swing bed.     Final Discharge Disposition Code 62 - inpatient rehab facility                   Discharge Codes    No documentation.                 Expected Discharge Date and Time       Expected Discharge Date Expected Discharge Time    Mar 25, 2024               Ciara Disla RN

## 2024-03-25 NOTE — PLAN OF CARE
Goal Outcome Evaluation:  Plan of Care Reviewed With: patient, family        Progress: improving

## 2024-03-25 NOTE — DISCHARGE PLACEMENT REQUEST
"Patrice Heredia (78 y.o. Male)        Ciaar Naif SHEPHERD, RN, [facility]  [phone]   Date of Birth   1945    Social Security Number       Address   [address] [address]    Home Phone   [phone]    MRN   [id number]       Faith   [employer]    Marital Status   Single                            Admission Date   3/20/24    Admission Type   Emergency    Admitting Provider   Satish Quintanilla MD    Attending Provider   Satish Quintanilla MD    Department, Room/Bed   [facility] 3F, S313/1       Discharge Date       Discharge Disposition       Discharge Destination                                 Attending Provider: Satish Quintanilla MD    Allergies: No Known Allergies    Isolation: None   Infection: None   Code Status: CPR    Ht: 170.2 cm (67.01\")   Wt: 61.2 kg (134 lb 14.7 oz)    Admission Cmt: None   Principal Problem: Aphasia [R47.01]                   Active Insurance as of 3/20/2024       Primary Coverage       Payor Plan Insurance Group Employer/Plan Group    HUMANA MEDICARE REPLACEMENT HUMANA MED ADV PPO [id number]       Payor Plan Address Payor Plan Phone Number Payor Plan Fax Number Effective Dates    [address] [address] [phone]  1/1/2022 - None Entered    [facility] [address]         Subscriber Name Subscriber Birth Date Member ID       PATRICE HEREDIA 1945 [id number]                     Emergency Contacts        (Rel.) Home Phone Work Phone Mobile Phone    NO HEREDIA (Son) [phone] -- [phone]    MIGUEL HEREDIA (Relative) [phone] -- [phone]              Insurance Information                  HUMANA MEDICARE REPLACEMENT/HUMANA MED ADV PPO Phone: [phone]    Subscriber: Patrice Heredia Subscriber#: [id number]    Group#: [id number] Precert#: --             History & Physical        Yanick Curtis MD at 03/20/24 [facility]              [facility] Medicine Services  HISTORY AND PHYSICAL    Patient Name: Patrice" Anita  : 1945  MRN: [id number]  Primary Care Physician: Delano Song DO  Date of admission: 3/20/2024      Subjective  Subjective     Chief Complaint: AMS    HPI:  Patrice Howard is a 78 y.o. male with history of DM on Metformin, tobacco abuse, daily alcohol use, history of prostate cancer s/p resection, s/p partial XRT, and on Lupron, HTN, here with AMS. Per son, at bedside, he noted that he was altered this AM. Noted HA with N/V last night. Noted chills overnight per son. He's restless and agitated now. Today his son check on him and he had slurred speech with word finding difficulty, noted that he stumbled when he walked with L weakness, and couldn't discern that his cigarettes and lighter had been placed in his L hand. The patient speaks only intermittently. He is oriented to person only. He seems confused. He does note HA with coughing.     The son does note drinking beer and liquor daily    Personal History     Past Medical History:   Diagnosis Date    Diabetes     Elevated PSA     Prostate cancer            History reviewed. No pertinent surgical history.    Family History: NC    Social History:  reports that he has been smoking cigarettes. He has a 2 pack-year smoking history. He has never used smokeless tobacco. He reports current alcohol use of about 4.0 standard drinks of alcohol per week. He reports that he does not use drugs.  Social History     Social History Narrative    Not on file       Medications:  Available home medication information reviewed.  Caltrate 600+D Plus Minerals, aspirin, lisinopril, losartan, metFORMIN, sildenafil, and triamcinolone    No Known Allergies    Objective  Objective     Vital Signs:   Temp:  [98.5 °F (36.9 °C)] 98.5 °F (36.9 °C)  Heart Rate:  [68] 68  Resp:  [12-15] 12  BP: (159)/(85) 159/85  Total (NIH Stroke Scale): 4    Physical Exam   NAD, alert, oriented to person only  OP clear, dry MM  Neck supple  No LAD  RRR, no obvious murmur  Decreased at  bases, otherwise clear  +BS, ND, NT, soft  L facial droop, expressive aphasia, with slurred speech, seemingly LUE weakness 3/5, B LE weakness 4+/5, possibly decreased sensation in LUE, gait not tested      Result Review:  I have personally reviewed the results from the time of this admission to 3/20/2024 14:34 EDT and agree with these findings:  []  Laboratory list / accordion  []  Microbiology  []  Radiology  []  EKG/Telemetry   []  Cardiology/Vascular   []  Pathology  []  Old records  []  Other:  Most notable findings include: CT perfusion study abnormality noted, EKG NSR, UA with WBC 3-5, RBC 3-5, Troponin 11, WBC 8, Hgb 14.1, BMP pending      LAB RESULTS:      Lab 03/20/24  1253 03/20/24  1249   WBC 8.14  --    HEMOGLOBIN 14.1  --    HEMOGLOBIN, POC  --  14.6   HEMATOCRIT 40.1  --    HEMATOCRIT POC  --  43   PLATELETS 154  --    NEUTROS ABS 5.62  --    IMMATURE GRANS (ABS) 0.03  --    LYMPHS ABS 1.23  --    MONOS ABS 1.22*  --    EOS ABS 0.01  --    .2*  --    APTT 28.0  --          Lab 03/20/24  1249   CREATININE 0.90   EGFR 87.4         Lab 03/20/24  1253   ALT (SGPT) 27   AST (SGOT) 27         Lab 03/20/24  1253   HSTROP T 11                 UA          3/20/2024    13:45   Urinalysis   Squamous Epithelial Cells, UA 0-2    Specific Gravity, UA 1.075    Ketones, UA 15 mg/dL (1+)    Blood, UA Small (1+)    Leukocytes, UA Negative    Nitrite, UA Negative    RBC, UA 3-5    WBC, UA 3-5    Bacteria, UA None Seen        Microbiology Results (last 10 days)       ** No results found for the last 240 hours. **            CT Angiogram Head w AI Analysis of LVO    Result Date: 3/20/2024  CT ANGIOGRAM HEAD W AI ANALYSIS OF LVO, CT CEREBRAL PERFUSION W WO CONTRAST, CT ANGIOGRAM NECK Date of Exam: 3/20/2024 1:02 PM EDT Indication: Neuro deficit, acute stroke suspected Neuro deficit, acute stroke suspected. Comparison: None available. Technique: CTA of the head was performed after the uneventful intravenous  administration of . Reconstructed coronal and sagittal images were also obtained. In addition, a 3-D volume rendered image was created for interpretation. Automated exposure control and iterative reconstruction methods were used. FINDINGS: Vascular Findings: Atherosclerotic plaque within the right carotid bifurcation and right carotid siphon. The right common carotid, internal carotid, middle cerebral, anterior cerebral, vertebral, and posterior cerebral arteries are patent without abrupt cut off or aneurysmal dilation. There is absence of the A1 segment of the right anterior cerebral artery. There is fetal origin of the right posterior cerebral artery. Atherosclerotic plaque within the left carotid bifurcation and left carotid siphon. The left common carotid, internal carotid, middle cerebral, anterior cerebral, vertebral, and posterior cerebral arteries are patent without abrupt cut off or aneurysmal dilation. Basilar artery appears patent and appears unremarkable. Non-vascular Findings: For description of nonvascular intracranial findings, please refer to the noncontrast head CT performed the same date. No acute abnormality is identified within the visualized soft tissue or bony structures of the neck. Cervical spondylosis is present. The visualized lung apices are clear. CT Perfusion: CBF (<30%) volume: 0 mL Tmax (>6.0s) volume: 3 mL Mismatch volume: 3 mL Mismatch ratio: Infinite     Impression: 1.No intracranial large vessel occlusion is identified. 2.No significant stenosis of the bilateral internal carotid arteries. 3.CT perfusion study demonstrates a tiny focus of prolonged Tmax greater than 6 seconds (3 mL) within the right occipital lobe. This could be artifactual. Tiny focus of ischemia cannot be excluded. Please correlate with MRI. Electronically Signed: Maurice Lam MD  3/20/2024 1:48 PM EDT  Workstation ID: [id number]    CT Angiogram Neck    Result Date: 3/20/2024  CT ANGIOGRAM HEAD W AI ANALYSIS OF  LVO, CT CEREBRAL PERFUSION W WO CONTRAST, CT ANGIOGRAM NECK Date of Exam: 3/20/2024 1:02 PM EDT Indication: Neuro deficit, acute stroke suspected Neuro deficit, acute stroke suspected. Comparison: None available. Technique: CTA of the head was performed after the uneventful intravenous administration of . Reconstructed coronal and sagittal images were also obtained. In addition, a 3-D volume rendered image was created for interpretation. Automated exposure control and iterative reconstruction methods were used. FINDINGS: Vascular Findings: Atherosclerotic plaque within the right carotid bifurcation and right carotid siphon. The right common carotid, internal carotid, middle cerebral, anterior cerebral, vertebral, and posterior cerebral arteries are patent without abrupt cut off or aneurysmal dilation. There is absence of the A1 segment of the right anterior cerebral artery. There is fetal origin of the right posterior cerebral artery. Atherosclerotic plaque within the left carotid bifurcation and left carotid siphon. The left common carotid, internal carotid, middle cerebral, anterior cerebral, vertebral, and posterior cerebral arteries are patent without abrupt cut off or aneurysmal dilation. Basilar artery appears patent and appears unremarkable. Non-vascular Findings: For description of nonvascular intracranial findings, please refer to the noncontrast head CT performed the same date. No acute abnormality is identified within the visualized soft tissue or bony structures of the neck. Cervical spondylosis is present. The visualized lung apices are clear. CT Perfusion: CBF (<30%) volume: 0 mL Tmax (>6.0s) volume: 3 mL Mismatch volume: 3 mL Mismatch ratio: Infinite     Impression: 1.No intracranial large vessel occlusion is identified. 2.No significant stenosis of the bilateral internal carotid arteries. 3.CT perfusion study demonstrates a tiny focus of prolonged Tmax greater than 6 seconds (3 mL) within the right  occipital lobe. This could be artifactual. Tiny focus of ischemia cannot be excluded. Please correlate with MRI. Electronically Signed: Maurice Lam MD  3/20/2024 1:48 PM EDT  Workstation ID: [id number]    CT CEREBRAL PERFUSION WITH & WITHOUT CONTRAST    Result Date: 3/20/2024  CT ANGIOGRAM HEAD W AI ANALYSIS OF LVO, CT CEREBRAL PERFUSION W WO CONTRAST, CT ANGIOGRAM NECK Date of Exam: 3/20/2024 1:02 PM EDT Indication: Neuro deficit, acute stroke suspected Neuro deficit, acute stroke suspected. Comparison: None available. Technique: CTA of the head was performed after the uneventful intravenous administration of . Reconstructed coronal and sagittal images were also obtained. In addition, a 3-D volume rendered image was created for interpretation. Automated exposure control and iterative reconstruction methods were used. FINDINGS: Vascular Findings: Atherosclerotic plaque within the right carotid bifurcation and right carotid siphon. The right common carotid, internal carotid, middle cerebral, anterior cerebral, vertebral, and posterior cerebral arteries are patent without abrupt cut off or aneurysmal dilation. There is absence of the A1 segment of the right anterior cerebral artery. There is fetal origin of the right posterior cerebral artery. Atherosclerotic plaque within the left carotid bifurcation and left carotid siphon. The left common carotid, internal carotid, middle cerebral, anterior cerebral, vertebral, and posterior cerebral arteries are patent without abrupt cut off or aneurysmal dilation. Basilar artery appears patent and appears unremarkable. Non-vascular Findings: For description of nonvascular intracranial findings, please refer to the noncontrast head CT performed the same date. No acute abnormality is identified within the visualized soft tissue or bony structures of the neck. Cervical spondylosis is present. The visualized lung apices are clear. CT Perfusion: CBF (<30%) volume: 0 mL Tmax (>6.0s)  volume: 3 mL Mismatch volume: 3 mL Mismatch ratio: Infinite     Impression: 1.No intracranial large vessel occlusion is identified. 2.No significant stenosis of the bilateral internal carotid arteries. 3.CT perfusion study demonstrates a tiny focus of prolonged Tmax greater than 6 seconds (3 mL) within the right occipital lobe. This could be artifactual. Tiny focus of ischemia cannot be excluded. Please correlate with MRI. Electronically Signed: Maurice Lam MD  3/20/2024 1:48 PM EDT  Workstation ID: [id number]    XR Chest 1 View    Result Date: 3/20/2024  XR CHEST 1 VW Date of Exam: 3/20/2024 1:18 PM EDT Indication: Acute Stroke Protocol (onset < 12 hrs) Comparison: CT chest September 12, 2023 Findings: The heart not definitely enlarged. The lungs seem clear. There are no pleural effusions. The visualized osseous structures do not appear unusual.     Impression: Impression: 1.An acute pulmonary process is not apparent. Electronically Signed: Roger Herring MD  3/20/2024 1:37 PM EDT  Workstation ID: [id number]    CT Head Without Contrast Stroke Protocol    Result Date: 3/20/2024  CT HEAD WO CONTRAST STROKE PROTOCOL Date of Exam: 3/20/2024 12:59 PM EDT Indication: Neuro deficit, acute, stroke suspected Neuro deficit, acute stroke suspected. Comparison: None available. Technique: Axial CT images were obtained of the head without contrast administration.  Reconstructed coronal images were also obtained. Automated exposure control and iterative construction methods were used. Scan Time: 1256 hours Results discussed with stroke team at 1259 hours. Findings: There is mild atrophy. There is mild periventricular hypodensity compatible with chronic small vessel ischemic insult. There is no acute mass effect or edema. There is calcification of the right vertebral artery at the skull base. There is no acute mass effect or edema. There are no findings suspicious for acute CVA or hemorrhage. There is moderate polypoid mucosal  thickening of the inferior maxillary sinuses.     Impression: Impression: Chronic findings. No acute process. Electronically Signed: Earnestine Ramirez MD  3/20/2024 1:09 PM EDT  Workstation ID: [id number]         Assessment & Plan  Assessment & Plan       Aphasia    Alcohol use    History of prostate cancer    HTN (hypertension)    This is a 78 year old male with history of prostate cancer s/p resection, radiation, on Lupron, HTN, DM, tobacco use, here with AMS    AMS  Possible CVA  -CT perfusion abnormality ordered  -ASA/plavix ordered per stroke team  -statin per neurology team  -MRI pending    Recent N/V/HA  Cough  -respiratory PCR pending    Hx of ETOH use/possible dependence  -thiamine/folate  -benzodiazepines per CIWA protocol    Hx of prostate cancer  -s/p resection  -on Lupron    HTN  -permissive per stroke protocol/orders    Hx of DM  -SSI      DVT prophylaxis:  Mechanical DVT prophylaxis orders are signed and held.            CODE STATUS:    Code Status and Medical Interventions:   Ordered at: 03/20/24 1429     Code Status (Patient has no pulse and is not breathing):    CPR (Attempt to Resuscitate)     Medical Interventions (Patient has pulse or is breathing):    Full Support       Expected Discharge   Expected discharge date/ time has not been documented.     Yanick Curtis MD  03/20/24      Electronically signed by Yanick Curtis MD at 03/20/24 1443       Current Facility-Administered Medications   Medication Dose Route Frequency Provider Last Rate Last Admin    acetaminophen (TYLENOL) suppository 650 mg  650 mg Rectal Q6H PRN Vanesa Hutchison PA-C   650 mg at 03/21/24 0105    acetaminophen (TYLENOL) tablet 650 mg  650 mg Oral Q6H PRN Fabiano Albarran MD   650 mg at 03/21/24 1859    aspirin EC tablet 81 mg  81 mg Oral Daily Fabiano Albarran MD   81 mg at 03/25/24 1005    sennosides-docusate (PERICOLACE) 8.6-50 MG per tablet 2 tablet  2 tablet Oral BID PRN Yanick Curtis MD   2 tablet at  03/25/24 1333    And    polyethylene glycol (MIRALAX) packet 17 g  17 g Oral Daily PRN Yanick Curtis MD        And    bisacodyl (DULCOLAX) EC tablet 5 mg  5 mg Oral Daily PRN Yanick Curtis MD        And    bisacodyl (DULCOLAX) suppository 10 mg  10 mg Rectal Daily PRN Yanick Curtis MD        Calcium Replacement - Follow Nurse / BPA Driven Protocol   Does not apply PRN Yanick Curtis MD        dextrose (D50W) (25 g/50 mL) IV injection 25 g  25 g Intravenous Q15 Min PRN Fabiano Albarran MD        dextrose (GLUTOSE) oral gel 15 g  15 g Oral Q15 Min PRN Fabiano Albarran MD        FLUoxetine (PROzac) capsule 20 mg  20 mg Oral Daily Fabiano Albarran MD   20 mg at 03/25/24 1004    folic acid 1 mg in sodium chloride 0.9 % 50 mL IVPB  1 mg Intravenous Daily Yanick Curtis  mL/hr at 03/25/24 1005 1 mg at 03/25/24 1005    glucagon (GLUCAGEN) injection 1 mg  1 mg Intramuscular Q15 Min PRN Yanick Curtis MD        Insulin Lispro (humaLOG) injection 2-7 Units  2-7 Units Subcutaneous 4x Daily AC & at Bedtime Fabiano Albarran MD   2 Units at 03/25/24 1334    lactated ringers infusion  100 mL/hr Intravenous Continuous Yanick Curtis  mL/hr at 03/24/24 0904 100 mL/hr at 03/24/24 0904    losartan (COZAAR) tablet 50 mg  50 mg Oral Daily Fabiano Albarran MD   50 mg at 03/25/24 1005    Magnesium Standard Dose Replacement - Follow Nurse / BPA Driven Protocol   Does not apply PRN Yanick Curtis MD        nicotine (NICODERM CQ) 14 MG/24HR patch 1 patch  1 patch Transdermal Q24H Fabiano Albarran MD   1 patch at 03/24/24 1700    ondansetron ODT (ZOFRAN-ODT) disintegrating tablet 4 mg  4 mg Oral Q6H PRN Yanick Curtis MD        oxazepam (SERAX) capsule 15 mg  15 mg Oral Q8H Fabiano Albarran MD   15 mg at 03/25/24 1333    pantoprazole (PROTONIX) injection 40 mg  40 mg Intravenous Q12H Fabiano Albarran MD   40 mg at 03/25/24 1004    Phosphorus Replacement - Follow  Nurse / BPA Driven Protocol   Does not apply PRN Yanick Curtis MD        Potassium Replacement - Follow Nurse / BPA Driven Protocol   Does not apply PRN Yanick Curtis MD        prochlorperazine (COMPAZINE) injection 5 mg  5 mg Intravenous Q6H PRN Yanick Curtis MD        Or    prochlorperazine (COMPAZINE) tablet 5 mg  5 mg Oral Q6H PRN Yanick Curtis MD        Or    prochlorperazine (COMPAZINE) suppository 25 mg  25 mg Rectal Q12H PRN Yanick Curtis MD        QUEtiapine (SEROquel) tablet 50 mg  50 mg Oral Nightly Fabiano Albarran MD   50 mg at 24 2135    sodium chloride 0.9 % flush 10 mL  10 mL Intravenous PRN Jose Jimenez PA-C        sodium chloride 0.9 % flush 10 mL  10 mL Intravenous Q12H Yanick Curtis MD   10 mL at 24 1008    sodium chloride 0.9 % infusion 40 mL  40 mL Intravenous PRN Yanick Curtis MD        thiamine (VITAMIN B-1) tablet 100 mg  100 mg Oral Daily Kev Rushing MD   100 mg at 24 1004    vitamin B-12 (CYANOCOBALAMIN) tablet 1,000 mcg  1,000 mcg Oral Daily Fabiano Albarran MD   1,000 mcg at 24 1005    ziprasidone (GEODON) injection 10 mg  10 mg Intramuscular Q6H PRN Fabiano Albarran MD   10 mg at 24 0606        Physician Progress Notes (most recent note)        Senthil Pereira DO at 24 0958           [facility] Neurology    Progress Note    Patient Name: Patrice Howard  : 1945  MRN: [id number]  Primary Care Physician:  Delano Song DO  Date of admission: 3/20/2024    Subjective     Reason for consult: Altered mental status    History of Present Illness   Doing well, still asked to go home.  Patient is having some left-sided pectoralis twitching that appears to come and go.    Review of Systems   General: Negative for fever, nausea, or vomiting.   Neurological: Negative for headache, pain, or weakness.     Objective     Vitals:   Temp:  [96.8 °F (36 °C)-98.9 °F (37.2 °C)] 96.8 °F (36 °C)  Heart  Rate:  [61-78] 65  Resp:  [16-18] 17  BP: (131-157)/(78-97) 156/92    Neurologic Exam   Mental status/Cognition: alert; oriented to person, place, not to year, or month.  Some difficulty with doing days of the week backwards  Speech/language: fluent; comprehension intact    Cranial nerves: PERRL. EOMI. Face appears symmetric. No dysarthria.  Shoulder shrug symmetric.  Tongue protrudes midline    Motor: No drift in any extremities, able to raise all to antigravity.    Sensation: intact to light touch throughout    Coordination/Complex Motor: Finger-to-nose intact bilaterally without dysmetria    Current Medications    Current Facility-Administered Medications:     acetaminophen (TYLENOL) suppository 650 mg, 650 mg, Rectal, Q6H PRN, Vanesa Hutchison PA-C, 650 mg at 03/21/24 0105    acetaminophen (TYLENOL) tablet 650 mg, 650 mg, Oral, Q6H PRN, Fabiano Albarran MD, 650 mg at 03/21/24 1859    aspirin chewable tablet 81 mg, 81 mg, Oral, Daily, 81 mg at 03/24/24 0902 **OR** aspirin suppository 300 mg, 300 mg, Rectal, Daily, Yanick Curtis MD    atorvastatin (LIPITOR) tablet 80 mg, 80 mg, Oral, Nightly, Jose Jimenez PA-C, 80 mg at 03/23/24 2106    sennosides-docusate (PERICOLACE) 8.6-50 MG per tablet 2 tablet, 2 tablet, Oral, BID PRN, 2 tablet at 03/23/24 1103 **AND** polyethylene glycol (MIRALAX) packet 17 g, 17 g, Oral, Daily PRN **AND** bisacodyl (DULCOLAX) EC tablet 5 mg, 5 mg, Oral, Daily PRN **AND** bisacodyl (DULCOLAX) suppository 10 mg, 10 mg, Rectal, Daily PRN, Yanick Curtis MD    Calcium Replacement - Follow Nurse / BPA Driven Protocol, , Does not apply, PRN, Yanick Curtis MD    cefdinir (OMNICEF) capsule 300 mg, 300 mg, Oral, Q12H, Fabiano Albarran MD, 300 mg at 03/24/24 0902    dextrose (D50W) (25 g/50 mL) IV injection 25 g, 25 g, Intravenous, Q15 Min PRN, aFbiano Albarran MD    dextrose (GLUTOSE) oral gel 15 g, 15 g, Oral, Q15 Min PRN, Fabiano Albarran MD    FLUoxetine  (PROzac) capsule 20 mg, 20 mg, Oral, Daily, Fabiano Albarran MD, 20 mg at 03/24/24 0902    folic acid 1 mg in sodium chloride 0.9 % 50 mL IVPB, 1 mg, Intravenous, Daily, Yanick Curtis MD, Last Rate: 100 mL/hr at 03/24/24 0902, 1 mg at 03/24/24 0902    glucagon (GLUCAGEN) injection 1 mg, 1 mg, Intramuscular, Q15 Min PRN, Yanick Curtis MD    Insulin Lispro (humaLOG) injection 2-7 Units, 2-7 Units, Subcutaneous, 4x Daily AC & at Bedtime, Fabiano Albarran MD, 2 Units at 03/23/24 1647    lactated ringers infusion, 100 mL/hr, Intravenous, Continuous, Yanick Curtis MD, Last Rate: 100 mL/hr at 03/24/24 0904, 100 mL/hr at 03/24/24 0904    LORazepam (ATIVAN) tablet 0.5 mg, 0.5 mg, Oral, Q1H PRN **OR** midazolam (VERSED) injection 2 mg, 2 mg, Intravenous, Q1H PRN, 2 mg at 03/23/24 1836 **OR** LORazepam (ATIVAN) tablet 2 mg, 2 mg, Oral, Q1H PRN **OR** midazolam (VERSED) injection 4 mg, 4 mg, Intravenous, Q1H PRN, 4 mg at 03/23/24 2358 **OR** midazolam (VERSED) injection 4 mg, 4 mg, Intravenous, Q15 Min PRN **OR** midazolam (VERSED) injection 4 mg, 4 mg, Intramuscular, Q15 Min PRN, Kev Rushing MD    losartan (COZAAR) tablet 25 mg, 25 mg, Oral, Daily, Fabiano Albarran MD, 25 mg at 03/24/24 0902    Magnesium Standard Dose Replacement - Follow Nurse / BPA Driven Protocol, , Does not apply, PRN, Yanick Curtis MD    nicotine (NICODERM CQ) 14 MG/24HR patch 1 patch, 1 patch, Transdermal, Q24H, Fabiano Albarran MD, 1 patch at 03/23/24 1835    ondansetron ODT (ZOFRAN-ODT) disintegrating tablet 4 mg, 4 mg, Oral, Q6H PRMONY, Yanick Curtis MD    oxazepam (SERAX) capsule 15 mg, 15 mg, Oral, Q8H, Fabiano Albarran MD, 15 mg at 03/24/24 0540    pantoprazole (PROTONIX) injection 40 mg, 40 mg, Intravenous, Q12H, Fabiano Albarran MD, 40 mg at 03/24/24 0902    Phosphorus Replacement - Follow Nurse / BPA Driven Protocol, , Does not apply, PRN, Yanick Curtis MD    Potassium Replacement -  Follow Nurse / BPA Driven Protocol, , Does not apply, PRN, Yanick Curtis MD    prochlorperazine (COMPAZINE) injection 5 mg, 5 mg, Intravenous, Q6H PRN **OR** prochlorperazine (COMPAZINE) tablet 5 mg, 5 mg, Oral, Q6H PRN **OR** prochlorperazine (COMPAZINE) suppository 25 mg, 25 mg, Rectal, Q12H PRN, Yanick Curtis MD    QUEtiapine (SEROquel) tablet 50 mg, 50 mg, Oral, Nightly, Fabiano Albarran MD, 50 mg at 03/23/24 2106    sodium chloride 0.9 % flush 10 mL, 10 mL, Intravenous, Q12H, Jose Jimenez PA-C, 10 mL at 03/23/24 2107    sodium chloride 0.9 % flush 10 mL, 10 mL, Intravenous, PRN, Jose Jimenez PA-C    sodium chloride 0.9 % flush 10 mL, 10 mL, Intravenous, Q12H, Yanick Curtis MD, 10 mL at 03/24/24 0903    sodium chloride 0.9 % flush 10 mL, 10 mL, Intravenous, PRN, Yanick Curtis MD    sodium chloride 0.9 % infusion 40 mL, 40 mL, Intravenous, PRN, Jose Jimenez PA-C    sodium chloride 0.9 % infusion 40 mL, 40 mL, Intravenous, PRN, Yanick Curtis MD    thiamine (VITAMIN B-1) tablet 100 mg, 100 mg, Oral, Daily, Kev Rushing MD, 100 mg at 03/24/24 0902    vitamin B-12 (CYANOCOBALAMIN) tablet 1,000 mcg, 1,000 mcg, Oral, Daily, Fabiano Albarran MD, 1,000 mcg at 03/24/24 0902    ziprasidone (GEODON) injection 10 mg, 10 mg, Intramuscular, Q6H PRN, Fabiano Albarran MD, 10 mg at 03/22/24 0606    Laboratory Results:   Lab Results   Component Value Date    GLUCOSE 101 (H) 03/23/2024    CALCIUM 8.9 03/23/2024     (L) 03/23/2024    K 4.3 03/23/2024    CO2 23.0 03/23/2024     03/23/2024    BUN 6 (L) 03/23/2024    CREATININE 0.66 (L) 03/23/2024    BCR 9.1 03/23/2024    ANIONGAP 10.0 03/23/2024     Lab Results   Component Value Date    WBC 5.56 03/23/2024    HGB 13.1 03/23/2024    HCT 37.0 (L) 03/23/2024    .1 (H) 03/23/2024     (L) 03/23/2024     Lab Results   Component Value Date    CHOL 131 03/21/2024     Lab Results   Component Value Date    HDL 63  (H) 03/21/2024     Lab Results   Component Value Date    LDL 50 03/21/2024     Lab Results   Component Value Date    TRIG 95 03/21/2024     Lab Results   Component Value Date    HGBA1C 5.30 03/21/2024     Lab Results   Component Value Date    FOLATE 9.98 03/20/2024     Lab Results   Component Value Date    PMBJHXXK14 279 03/20/2024         Images/Procedures   CT head 03/20/2024  Mild generalized atrophy.  Mild chronic small vessel ischemic disease.    CT angio brain and neck 3/20/2024  No LVO or significant stenosis.  CT perfusion 3/20/2024  Tiny focus of prolonged Tmax within right occipital lobe.  Possibly artifactual.    CT head 3/20/2024  No acute intracranial abnormalities.    MRI brain with and without contrast 3/20/2024  Atrophy and chronic microvascular ischemic disease.    Routine EEG 3/21/2024  Normal study and awake and asleep states.    MRI brain with and without contrast 3/23/2024  No acute intracranial normality.  Mild chronic small vessel ischemic change and mild generalized parenchymal volume loss.    Assessment / Plan   Active Hospital Problems:  Headache, improved  Toxic metabolic encephalopathy, improved  Alcohol withdrawal  B12 deficiency  Strokelike symptoms    Brief Patient Summary:  Patrice Howard is a 78 y.o. male with history of type 2 diabetes, daily alcohol use, prostate cancer, hypertension presenting with altered mental status and headache.  Hospital course complicated by alcohol withdrawal requiring benzodiazepines, headache and low-grade fever, delirium.  CT head, CTA unremarkable.  CT perfusion shows small area of prolonged Tmax within the right occipital lobe, but MRI negative for acute stroke.  MRI brain repeated for left-sided strokelike symptoms, also negative for acute stroke.  Likely related to encephalopathy, alcohol withdrawal.  Overall mental status has improved.  He is pending rehab placement.    Plan:   -Seroquel 50 mg nightly  -Thiamine 100 mg daily  -Continue aspirin 81  mg daily  -Discontinue Plavix since no stroke on MRI and to reduce bleeding risk  -CIWA precautions  -Geodon 10 mg every 6 hours as needed for agitation  -No need for LP    I have discussed the above with the patient, family, bedside RN, hospitalist  Time spent with patient: 35 minutes in face-to-face evaluation and management of the patient.      Senthil Pereira DO, MS              Electronically signed by Senthil Pereira DO at 24 1413          Physical Therapy Notes (most recent note)        Antonieta Sousa PT at 24 1520  Version 1 of 1         Patient Name: Patrice Howard  : 1945    MRN: [id number]                              Today's Date: 3/22/2024       Admit Date: 3/20/2024    Visit Dx:     ICD-10-CM ICD-9-CM   1. Altered mental status, unspecified altered mental status type  R41.82 780.97   2. Difficulty with speech  R47.9 784.59   3. Dysphagia, unspecified type  R13.10 787.20     Patient Active Problem List   Diagnosis    Aphasia    Alcohol use    History of prostate cancer    HTN (hypertension)    Fever    Acute metabolic encephalopathy     Past Medical History:   Diagnosis Date    Diabetes     Elevated PSA     Prostate cancer      History reviewed. No pertinent surgical history.   General Information       Row Name 24 1608          Physical Therapy Time and Intention    Document Type therapy note (daily note)  -CD     Mode of Treatment physical therapy  -CD       Row Name 24 1608          General Information    Patient Profile Reviewed yes  -CD     Existing Precautions/Restrictions fall  PT LETHARGIC, DECREASED PROCESSING, MONITOR ORTHOSTATICS.  -CD     Barriers to Rehab medically complex  -CD       Row Name 24 1605          Cognition    Orientation Status (Cognition) oriented to;person;verbal cues/prompts needed for orientation;place  -CD       Row Name 24 1608          Safety Issues, Functional Mobility    Safety Issues Affecting Function (Mobility) ability to  follow commands;problem-solving;awareness of need for assistance;safety precaution awareness;safety precautions follow-through/compliance;insight into deficits/self-awareness;sequencing abilities;judgment  -CD     Impairments Affecting Function (Mobility) balance;cognition;coordination;endurance/activity tolerance;motor control;motor planning;postural/trunk control;strength;visual/perceptual  -CD     Cognitive Impairments, Mobility Safety/Performance awareness, need for assistance;insight into deficits/self-awareness;problem-solving/reasoning;safety precaution awareness;safety precaution follow-through;sequencing abilities  -CD     Comment, Safety Issues/Impairments (Mobility) HAS HEAVY R LEAN AND RESISTS MOVEMENT TO THE L. LETHARGIC INITIALLY BUT MORE ALERT ONCE SITTING EOB. DECREASED PROCESSING.  -CD               User Key  (r) = Recorded By, (t) = Taken By, (c) = Cosigned By      Initials Name Provider Type    CD Antonieta Sousa, PT Physical Therapist                   Mobility       Row Name 03/22/24 1611          Bed Mobility    Bed Mobility rolling right;sidelying-sit  -CD     Supine-Sit [place] (Bed Mobility) maximum assist (25% patient effort)  -CD     Sidelying-Sit [place] (Bed Mobility) maximum assist (25% patient effort)  -CD     Assistive Device (Bed Mobility) bed rails;head of bed elevated;draw sheet  -CD     Comment, (Bed Mobility) MANUAL ASSIST TO UPRIGHT TRUNK AND MOVE LE'S OFF EOB.  -CD       Row Name 03/22/24 1611          Transfers    Comment, (Transfers) MECHANICAL LIFT BED TO CHAIR FOR SAFETY DUE TO POOR SITTING BALANCE AND HEAVY R LEAN WITH RESISTANCE TO MIDLINE.  -CD       Row Name 03/22/24 1611          Bed-Chair Transfer    Bed-Chair [place] (Transfers) dependent (less than 25% patient effort)  -CD     Assistive Device (Bed-Chair Transfers) lift device  -CD     Comment, (Bed-Chair Transfer) NSG IN TO ASSIST  -CD       Row Name 03/22/24 1611          Sit-Stand Transfer     Sit-Stand [place] (Transfers) not tested  -CD     Comment, (Sit-Stand Transfer) DEFERRED TO FOCUS ON STATIC SITTING BALANCE, COMMAND FOLLOWING AND ATTENTION TO THE L.  -CD       Row Name 03/22/24 1611          Gait/Stairs (Locomotion)    [place] Level (Gait) unable to assess  -CD     Comment, (Gait/Stairs) DEFERRED DUE TO WORSENING SITTING BALANCE.  -CD               User Key  (r) = Recorded By, (t) = Taken By, (c) = Cosigned By      Initials Name Provider Type    Antonieta Cancino PT Physical Therapist                   Obj/Interventions       Row Name 03/22/24 1613          Motor Skills    Therapeutic Exercise --  COMPLETED SEATED LAQ X 10 REPS EACH. FOCUS ON BALANCE ACTIVITY SEATED AT EOB, ATTENDING TO L AND COMMAND FOLLOWING.  -CD       Row Name 03/22/24 1613          Balance    Static Sitting Balance dependent  PROGRESSED TO MAX ASSIST WITH MAX CUES AND POSITIONING.  -CD     Dynamic Sitting Balance dependent  -CD     Position, Sitting Balance unsupported;sitting edge of bed  -CD     Sit to Stand Dynamic Balance not tested  -CD     Comment, Balance DECLINE IN SITTING BALANCE NOTED TODAY. HEAVY R LEAN AND RESISTANT TO MOVEMENT TO MIDLINE/L. WORKED ON MIDLINE ORIENTATION.  -CD               User Key  (r) = Recorded By, (t) = Taken By, (c) = Cosigned By      Initials Name Provider Type    Antonieta Cancino, PT Physical Therapist                   Goals/Plan    No documentation.                  Clinical Impression       Row Name 03/22/24 1616          Pain Scale: FACES Pre/Post-Treatment    Pain: FACES Scale, Pretreatment 0-->no hurt  -CD     Posttreatment Pain Rating 0-->no hurt  -CD       Row Name 03/22/24 1616          Plan of Care Review    Plan of Care Reviewed With patient;son;daughter  -CD     Outcome Evaluation PT WITH NEW HEAVY R LEAN IN SITTING AND RESISTANCE TO MOVEMENT TOWARDS MIDLINE, LEFT. UANBLE TO MAINTAIN MIDLINE ORIENTATION DESPITE VERBAL/TACTILE CUES. CONTINUES WITH L SIDED WEAKNESS,  INATTENTION TO THE L AND DECREASED PROCESSING. LETHARGIC INITIALLY BUT MORE ALERT AND INITIATING SOME CONVERSATION AT END OF SESSION. RECOMMEND IRF AT D/C.  -CD       Row Name 03/22/24 1616          Therapy Assessment/Plan (PT)    Rehab Potential (PT) good, to achieve stated therapy goals  -CD     Criteria for Skilled Interventions Met (PT) yes;meets criteria;skilled treatment is necessary  -CD     Therapy Frequency (PT) 2 times/day  -CD       Row Name 03/22/24 1616          Vital Signs    Pre Systolic BP Rehab 135  -CD     Pre Treatment Diastolic BP 80  -CD     Post Systolic BP Rehab 149  -CD     Post Treatment Diastolic BP 82  -CD     Posttreatment Heart Rate (beats/min) 71  -CD     Pre SpO2 (%) 96  -CD     O2 Delivery Pre Treatment room air  -CD     O2 Delivery Intra Treatment room air  -CD     Post SpO2 (%) 96  -CD     O2 Delivery Post Treatment room air  -CD     Pre Patient Position Supine  -CD     Intra Patient Position Standing  -CD     Post Patient Position Sitting  -CD       Row Name 03/22/24 1616          Positioning and Restraints    Pre-Treatment Position in bed  -CD     Post Treatment Position chair  -CD     In Chair exit alarm on;with family/caregiver;call light within reach;encouraged to call for assist;notified nsg;legs elevated;RUE elevated;LUE elevated;waffle cushion;on mechanical lift sling  PILLOW AT R TRUNK TO FACILITATE MIDLINE ORIENTATION.  -CD               User Key  (r) = Recorded By, (t) = Taken By, (c) = Cosigned By      Initials Name Provider Type    CD Antonieta Sousa, PT Physical Therapist                   Outcome Measures       Row Name 03/22/24 1621          How much help from another person do you currently need...    Turning from your back to your side while in flat bed without using bedrails? 2  -CD     Moving from lying on back to sitting on the side of a flat bed without bedrails? 2  -CD     Moving to and from a bed to a chair (including a wheelchair)? 1  -CD     Standing up from  a chair using your arms (e.g., wheelchair, bedside chair)? 1  -CD     Climbing 3-5 steps with a railing? 1  -CD     To walk in hospital room? 1  -CD     AM-PAC 6 Clicks Score (PT) 8  -CD     Highest Level of Mobility Goal 3 --> Sit at edge of bed  -CD       Row Name 03/22/24 1621          Modified [place] Scale    Modified [place] Scale 4 - Moderately severe disability.  Unable to walk without assistance, and unable to attend to own bodily needs without assistance.  -CD       Row Name 03/22/24 1621          Functional Assessment    Outcome Measure Options Modified [place];AM-PAC 6 Clicks Basic Mobility (PT)  -CD               User Key  (r) = Recorded By, (t) = Taken By, (c) = Cosigned By      Initials Name Provider Type    CD Antonieta Sousa PT Physical Therapist                                 Physical Therapy Education       Title: PT OT SLP Therapies (In Progress)       Topic: Physical Therapy (Done)       Point: Mobility training (Done)       Learning Progress Summary             Patient Acceptance, E, VU,NR by CD at 3/22/2024 1621    Comment: SEE FLOWSHEET    Acceptance, E, VU,NR by CD at 3/21/2024 1544    Comment: BENEFITS OF OOB ACTIVITY, SAFETY WITH MOBILITY, PROGRESSION OF POC, D/C PLANNING,   Family Acceptance, E, VU,NR by CD at 3/21/2024 1544    Comment: BENEFITS OF OOB ACTIVITY, SAFETY WITH MOBILITY, PROGRESSION OF POC, D/C PLANNING,                         Point: Home exercise program (Done)       Learning Progress Summary             Patient Acceptance, E, VU,NR by CD at 3/22/2024 1621    Comment: SEE FLOWSHEET    Acceptance, E, VU,NR by CD at 3/21/2024 1544    Comment: BENEFITS OF OOB ACTIVITY, SAFETY WITH MOBILITY, PROGRESSION OF POC, D/C PLANNING,   Family Acceptance, E, VU,NR by CD at 3/21/2024 1544    Comment: BENEFITS OF OOB ACTIVITY, SAFETY WITH MOBILITY, PROGRESSION OF POC, D/C PLANNING,                         Point: Body mechanics (Done)       Learning Progress Summary             Patient  Acceptance, E, VU,NR by CD at 3/22/2024 1621    Comment: SEE FLOWSHEET    Acceptance, E, VU,NR by CD at 3/21/2024 1544    Comment: BENEFITS OF OOB ACTIVITY, SAFETY WITH MOBILITY, PROGRESSION OF POC, D/C PLANNING,   Family Acceptance, E, VU,NR by CD at 3/21/2024 1544    Comment: BENEFITS OF OOB ACTIVITY, SAFETY WITH MOBILITY, PROGRESSION OF POC, D/C PLANNING,                         Point: Precautions (Done)       Learning Progress Summary             Patient Acceptance, E, VU,NR by CD at 3/22/2024 1621    Comment: SEE FLOWSHEET    Acceptance, E, VU,NR by CD at 3/21/2024 1544    Comment: BENEFITS OF OOB ACTIVITY, SAFETY WITH MOBILITY, PROGRESSION OF POC, D/C PLANNING,   Family Acceptance, E, VU,NR by CD at 3/21/2024 1544    Comment: BENEFITS OF OOB ACTIVITY, SAFETY WITH MOBILITY, PROGRESSION OF POC, D/C PLANNING,                                         User Key       Initials Effective Dates Name Provider Type Discipline    CD 02/03/23 -  Antonieta Sousa, PT Physical Therapist PT                  PT Recommendation and Plan  Planned Therapy Interventions (PT): balance training, bed mobility training, gait training, patient/family education, strengthening, transfer training, postural re-education  Plan of Care Reviewed With: patient, son, daughter  Outcome Evaluation: PT WITH NEW HEAVY R LEAN IN SITTING AND RESISTANCE TO MOVEMENT TOWARDS MIDLINE, LEFT. UANBLE TO MAINTAIN MIDLINE ORIENTATION DESPITE VERBAL/TACTILE CUES. CONTINUES WITH L SIDED WEAKNESS, INATTENTION TO THE L AND DECREASED PROCESSING. LETHARGIC INITIALLY BUT MORE ALERT AND INITIATING SOME CONVERSATION AT END OF SESSION. RECOMMEND IRF AT D/C.     Time Calculation:   PT Evaluation Complexity  History, PT Evaluation Complexity: 3 or more personal factors and/or comorbidities  Examination of Body Systems (PT Eval Complexity): total of 4 or more elements  Clinical Presentation (PT Evaluation Complexity): evolving  Clinical Decision Making (PT Evaluation  Complexity): moderate complexity  Overall Complexity (PT Evaluation Complexity): moderate complexity     PT Charges       Row Name 24 1622             Time Calculation    Start Time 1520  -CD      PT Received On 24  -CD         Time Calculation- PT    Total Timed Code Minutes- PT 40 minute(s)  -CD         Timed Charges    23350 - PT Therapeutic Exercise Minutes 8  -CD      84497 -  PT Neuromuscular Reeducation Minutes 20  -CD      05309 - PT Therapeutic Activity Minutes 12  -CD         Total Minutes    Timed Charges Total Minutes 40  -CD       Total Minutes 40  -CD                User Key  (r) = Recorded By, (t) = Taken By, (c) = Cosigned By      Initials Name Provider Type    CD Antonieta Sousa, PT Physical Therapist                  Therapy Charges for Today       Code Description Service Date Service Provider Modifiers Qty    28036482461 HC GAIT TRAINING EA 15 MIN 3/21/2024 Antonieta Sousa, PT GP 1    82919114614 HC PT EVAL MOD COMPLEXITY 4 3/21/2024 Antonieta Sousa, PT GP 1    44296239208 HC PT NEUROMUSC RE EDUCATION EA 15 MIN 3/22/2024 Antonieta Sousa, PT GP 1    95007482760 HC PT THER PROC EA 15 MIN 3/22/2024 Antonieta Sousa, PT GP 1    19259798884 HC PT THERAPEUTIC ACT EA 15 MIN 3/22/2024 Antonieta Sousa, PT GP 1            PT G-Codes  Outcome Measure Options: Modified [place], AM-PAC 6 Clicks Basic Mobility (PT)  AM-PAC 6 Clicks Score (PT): 8  AM-PAC 6 Clicks Score (OT): 15  Modified [place] Scale: 4 - Moderately severe disability.  Unable to walk without assistance, and unable to attend to own bodily needs without assistance.  PT Discharge Summary  Anticipated Discharge Disposition (PT): inpatient rehabilitation facility    Anotnieta Sousa, PT  3/22/2024      Electronically signed by Antonieta Sousa, PT at 24 1628          Occupational Therapy Notes (most recent note)        Jairo Del Valle, OT at 24 9097          Patient Name: Patrice Howard  : 1945    MRN: [id number]                               Today's Date: 3/21/2024       Admit Date: 3/20/2024    Visit Dx:     ICD-10-CM ICD-9-CM   1. Altered mental status, unspecified altered mental status type  R41.82 780.97   2. Difficulty with speech  R47.9 784.59   3. Dysphagia, unspecified type  R13.10 787.20     Patient Active Problem List   Diagnosis    Aphasia    Alcohol use    History of prostate cancer    HTN (hypertension)    Fever     Past Medical History:   Diagnosis Date    Diabetes     Elevated PSA     Prostate cancer      History reviewed. No pertinent surgical history.   General Information       Row Name 03/21/24 1020          OT Time and Intention    Document Type evaluation  -CS     Mode of Treatment occupational therapy  -CS       Row Name 03/21/24 1020          General Information    Patient Profile Reviewed yes  -CS     Prior Level of Function independent:;all household mobility;ADL's;driving;using stairs  Pt is retired Proffessor/Artist, lives alone. No AD/DME reported at baseline for ADL completion or related mobility.  -CS     Existing Precautions/Restrictions fall;other (see comments)  delayed processing, lethargy  -CS     Barriers to Rehab none identified  -CS       Row Name 03/21/24 1020          Living Environment    People in Home alone  -CS       Row Name 03/21/24 1020          Home Main Entrance    Number of Stairs, Main Entrance five  -CS     Stair Railings, Main Entrance railings safe and in good condition  -CS       Row Name 03/21/24 1020          Stairs Within Home, Primary    Stairs, Within Home, Primary 2 level home, all needs can be met on main floor, walk-in shower on main floor, step-in tub on 2nd floor  -CS     Number of Stairs, Within Home, Primary twelve  -CS       Row Name 03/21/24 1020          Cognition    Orientation Status (Cognition) oriented to;person;verbal cues/prompts needed for orientation;place;situation;time;other (see comments)  able to select correct answer when given options  -CS       Row Name  03/21/24 1020          Safety Issues, Functional Mobility    Safety Issues Affecting Function (Mobility) ability to follow commands;awareness of need for assistance;insight into deficits/self-awareness;problem-solving;safety precaution awareness;safety precautions follow-through/compliance;sequencing abilities  -CS     Impairments Affecting Function (Mobility) balance;cognition;endurance/activity tolerance;strength;postural/trunk control  -CS     Cognitive Impairments, Mobility Safety/Performance awareness, need for assistance;insight into deficits/self-awareness;problem-solving/reasoning;safety precaution awareness;safety precaution follow-through;sequencing abilities  -CS     Comment, Safety Issues/Impairments (Mobility) divided attention, easily distracted, lethargic, delayed processing  -CS               User Key  (r) = Recorded By, (t) = Taken By, (c) = Cosigned By      Initials Name Provider Type    CS Jairo Del Valle OT Occupational Therapist                     Mobility/ADL's       Row Name 03/21/24 1027          Bed Mobility    Bed Mobility supine-sit;scooting/bridging  -CS     Scooting/Bridging [place] (Bed Mobility) minimum assist (75% patient effort);1 person assist  -CS     Supine-Sit [place] (Bed Mobility) moderate assist (50% patient effort);1 person assist  -CS     Assistive Device (Bed Mobility) bed rails;head of bed elevated;draw sheet  -CS     Comment, (Bed Mobility) one step commands, tactile cues for initiating BLEs to EOB, increased assist at trunk to achieve sitting posture, mild L lean with sitting  -CS       Row Name 03/21/24 1027          Transfers    Transfers sit-stand transfer;toilet transfer  -CS     Comment, (Transfers) RUE support, tactile guidance for grab bar use at toilet, extra time for command following  -       Row Name 03/21/24 1027          Sit-Stand Transfer    Sit-Stand [place] (Transfers) minimum assist (75% patient effort);verbal cues;nonverbal cues  (demo/gesture)  -       Row Name 03/21/24 1027          Functional Mobility    Functional Mobility- Ind. Level minimum assist (75% patient effort);moderate assist (50% patient effort);1 person + 1 person to manage equipment  -     Functional Mobility- Safety Issues step length decreased;sequencing ability decreased;balance decreased during turns  -     Functional Mobility- Comment defer to PT for specifics, shuffled gait with difficult navigating environment, Min to ModA for RUE support  -CS     Patient was able to Ambulate yes  -       Row Name 03/21/24 1027          Activities of Daily Living    BADL Assessment/Intervention lower body dressing;toileting  -       Row Name 03/21/24 1027          Lower Body Dressing Assessment/Training    [place] Level (Lower Body Dressing) don;socks;dependent (less than 25% patient effort)  -     Position (Lower Body Dressing) edge of bed sitting  -CS     Comment, (Lower Body Dressing) limited by cognitive, coordination, and balance deficits  -       Row Name 03/21/24 1027          Toileting Assessment/Training    [place] Level (Toileting) perform perineal hygiene;adjust/manage clothing;moderate assist (50% patient effort)  -     Assistive Devices (Toileting) grab bar/safety frame  -     Position (Toileting) unsupported sitting;unsupported standing  -CS     Comment, (Toileting) tactile guidance for grab bar use at toilet  -               User Key  (r) = Recorded By, (t) = Taken By, (c) = Cosigned By      Initials Name Provider Type     Jairo Del Valle, OT Occupational Therapist                   Obj/Interventions       Row Name 03/21/24 1031          Sensory Assessment (Somatosensory)    Sensory Assessment (Somatosensory) UE sensation intact;unable/difficult to assess  -     Sensory Assessment difficult to assess this date due to cognition, responds to lt touch bilaterally, further detailed assessment warranted  -       Row Name 03/21/24 1031           Vision Assessment/Intervention    Visual Impairment/Limitations corrective lenses full-time  -CS       Row Name 03/21/24 1031          Range of Motion Comprehensive    General Range of Motion bilateral upper extremity ROM WFL  -CS       Row Name 03/21/24 1031          Strength Comprehensive (MMT)    General Manual Muscle Testing (MMT) Assessment upper extremity strength deficits identified  -CS     Comment, General Manual Muscle Testing (MMT) Assessment BUE grossly 4-/5  -CS       Row Name 03/21/24 1031          Motor Skills    Motor Skills coordination;functional endurance  -CS     Coordination bimanual skills;moderate impairment  -CS     Functional Endurance O2 sats stable on RA  -CS       Row Name 03/21/24 1031          Balance    Balance Assessment sitting static balance;sitting dynamic balance;standing dynamic balance;standing static balance  -CS     Static Sitting Balance contact guard  -CS     Dynamic Sitting Balance minimal assist  -CS     Position, Sitting Balance unsupported;sitting edge of bed  -CS     Static Standing Balance minimal assist  -CS     Dynamic Standing Balance moderate assist  -CS     Position/Device Used, Standing Balance supported  -CS     Balance Interventions sitting;standing;sit to stand;occupation based/functional task  -CS               User Key  (r) = Recorded By, (t) = Taken By, (c) = Cosigned By      Initials Name Provider Type    CS Jairo Del Valle, OT Occupational Therapist                   Goals/Plan       Row Name 03/21/24 1041          Transfer Goal 1 (OT)    Activity/Assistive Device (Transfer Goal 1, OT) bed-to-chair/chair-to-bed;toilet  -CS     [place] Level/Cues Needed (Transfer Goal 1, OT) standby assist  -CS     Time Frame (Transfer Goal 1, OT) long term goal (LTG);10 days  -CS     Strategies/Barriers (Transfers Goal 1, OT) AD recs per PT  -CS     Progress/Outcome (Transfer Goal 1, OT) new goal  -CS       Row Name 03/21/24 1041          Dressing Goal 1 (OT)     Activity/Device (Dressing Goal 1, OT) lower body dressing  -CS     [place]/Cues Needed (Dressing Goal 1, OT) contact guard required  -CS     Time Frame (Dressing Goal 1, OT) long term goal (LTG);10 days  -CS     Progress/Outcome (Dressing Goal 1, OT) new goal  -CS       Row Name 03/21/24 1041          Grooming Goal 1 (OT)    Activity/Device (Grooming Goal 1, OT) hair care;oral care;wash face, hands  -CS     [place] (Grooming Goal 1, OT) standby assist  -CS     Time Frame (Grooming Goal 1, OT) long term goal (LTG);10 days  -CS     Strategies/Barriers (Grooming Goal 1, OT) sinkside  -CS     Progress/Outcome (Grooming Goal 1, OT) new goal  -       Row Name 03/21/24 1041          Strength Goal 1 (OT)    Strength Goal 1 (OT) Increase gross BUE strength 1 muscle grade to promote increased safety/Ind with ADL completion and related transfers  -CS     Time Frame (Strength Goal 1, OT) long term goal (LTG);10 days  -CS     Progress/Outcome (Strength Goal 1, OT) new goal  -       Row Name 03/21/24 1041          Therapy Assessment/Plan (OT)    Planned Therapy Interventions (OT) activity tolerance training;functional balance retraining;occupation/activity based interventions;ROM/therapeutic exercise;strengthening exercise;transfer/mobility retraining;patient/caregiver education/training;neuromuscular control/coordination retraining;adaptive equipment training  -CS               User Key  (r) = Recorded By, (t) = Taken By, (c) = Cosigned By      Initials Name Provider Type    CS Jairo Del Valle, OT Occupational Therapist                   Clinical Impression       Row Name 03/21/24 1033          Pain Assessment    Pretreatment Pain Rating 0/10 - no pain  -CS     Posttreatment Pain Rating 0/10 - no pain  -CS       Row Name 03/21/24 1033          Plan of Care Review    Plan of Care Reviewed With patient  -CS     Progress no change  -CS     Outcome Evaluation Baseline ADL completion limited by strength, balance,  coordination, and cognitive deficits warranting skilled IP OT services to promote return to PLOF. Rec d/c to IRF for best functional outcomes. Based on today's performance, recommend d/c to IRF for best funcitonal outcomes.  -CS       Row Name 03/21/24 1033          Therapy Assessment/Plan (OT)    Rehab Potential (OT) good, to achieve stated therapy goals  -CS     Criteria for Skilled Therapeutic Interventions Met (OT) yes;meets criteria;skilled treatment is necessary  -CS     Therapy Frequency (OT) daily  -CS       Row Name 03/21/24 1033          Therapy Plan Review/Discharge Plan (OT)    Anticipated Discharge Disposition (OT) inpatient rehabilitation facility  -CS       Row Name 03/21/24 1033          Vital Signs    Pre Systolic BP Rehab 137  RN cleared for eval  -CS     Pre Treatment Diastolic BP 64  -CS     Post Systolic BP Rehab 125  -CS     Post Treatment Diastolic BP 76  -CS     Posttreatment Heart Rate (beats/min) 50  -CS     O2 Delivery Pre Treatment room air  -CS     O2 Delivery Intra Treatment room air  -CS     Post SpO2 (%) 98  -CS     O2 Delivery Post Treatment room air  -CS     Pre Patient Position Supine  -CS     Intra Patient Position Standing  -CS     Post Patient Position Sitting  -CS       Row Name 03/21/24 1033          Positioning and Restraints    Pre-Treatment Position in bed  -CS     Post Treatment Position chair  -CS     In Chair notified nsg;reclined;sitting;exit alarm on;encouraged to call for assist;call light within reach;waffle cushion;legs elevated  -CS               User Key  (r) = Recorded By, (t) = Taken By, (c) = Cosigned By      Initials Name Provider Type    CS Jairo Del Valle, OT Occupational Therapist                   Outcome Measures       Row Name 03/21/24 1043          How much help from another is currently needed...    Putting on and taking off regular lower body clothing? 2  -CS     Bathing (including washing, rinsing, and drying) 2  -CS     Toileting (which includes  using toilet bed pan or urinal) 2  -CS     Putting on and taking off regular upper body clothing 3  -CS     Taking care of personal grooming (such as brushing teeth) 3  -CS     Eating meals 3  -CS     AM-PAC 6 Clicks Score (OT) 15  -CS       Row Name 03/21/24 1043          Modified [place] Scale    Modified [place] Scale 4 - Moderately severe disability.  Unable to walk without assistance, and unable to attend to own bodily needs without assistance.  -CS       Row Name 03/21/24 1043          Functional Assessment    Outcome Measure Options AM-PAC 6 Clicks Daily Activity (OT);Modified [place]  -CS               User Key  (r) = Recorded By, (t) = Taken By, (c) = Cosigned By      Initials Name Provider Type    Jairo Armas OT Occupational Therapist                    Occupational Therapy Education       Title: PT OT SLP Therapies (In Progress)       Topic: Occupational Therapy (In Progress)       Point: ADL training (Done)       Description:   Instruct learner(s) on proper safety adaptation and remediation techniques during self care or transfers.   Instruct in proper use of assistive devices.                  Learning Progress Summary             Patient Acceptance, E,D, VU,DU by  at 3/21/2024 1044                         Point: Home exercise program (Not Started)       Description:   Instruct learner(s) on appropriate technique for monitoring, assisting and/or progressing therapeutic exercises/activities.                  Learner Progress:  Not documented in this visit.              Point: Precautions (Done)       Description:   Instruct learner(s) on prescribed precautions during self-care and functional transfers.                  Learning Progress Summary             Patient Acceptance, E,D, VU,DU by  at 3/21/2024 1044                         Point: Body mechanics (Done)       Description:   Instruct learner(s) on proper positioning and spine alignment during self-care, functional mobility activities  and/or exercises.                  Learning Progress Summary             Patient Acceptance, E,D, VU,DU by  at 3/21/2024 1044                                         User Key       Initials Effective Dates Name Provider Type Discipline     06/16/21 -  Jairo Del Valle OT Occupational Therapist OT                  OT Recommendation and Plan  Planned Therapy Interventions (OT): activity tolerance training, functional balance retraining, occupation/activity based interventions, ROM/therapeutic exercise, strengthening exercise, transfer/mobility retraining, patient/caregiver education/training, neuromuscular control/coordination retraining, adaptive equipment training  Therapy Frequency (OT): daily  Plan of Care Review  Plan of Care Reviewed With: patient  Progress: no change  Outcome Evaluation: Baseline ADL completion limited by strength, balance, coordination, and cognitive deficits warranting skilled IP OT services to promote return to PLOF. Rec d/c to IRF for best functional outcomes. Based on today's performance, recommend d/c to IRF for best funcitonal outcomes.     Time Calculation:   Evaluation Complexity (OT)  Review Occupational Profile/Medical/Therapy History Complexity: brief/low complexity  Assessment, Occupational Performance/Identification of Deficit Complexity: 1-3 performance deficits  Clinical Decision Making Complexity (OT): problem focused assessment/low complexity  Overall Complexity of Evaluation (OT): low complexity     Time Calculation- OT       Row Name 03/21/24 1045             Time Calculation- OT    OT Start Time 0945  -CS      OT Received On 03/21/24  -CS      OT Goal Re-Cert Due Date 03/31/24  -CS         Timed Charges    91269 - OT Self Care/Mgmt Minutes 10  -CS         Untimed Charges    OT Eval/Re-eval Minutes 47  -CS         Total Minutes    Timed Charges Total Minutes 10  -CS      Untimed Charges Total Minutes 47  -CS       Total Minutes 57  -CS                User Key  (r) =  Recorded By, (t) = Taken By, (c) = Cosigned By      Initials Name Provider Type    CS Jairo Del Valle OT Occupational Therapist                  Therapy Charges for Today       Code Description Service Date Service Provider Modifiers Qty    29557084051 HC OT SELF CARE/MGMT/TRAIN EA 15 MIN 3/21/2024 Jairo Del Valle OT GO 1    66264058615 HC OT EVAL LOW COMPLEXITY 4 3/21/2024 Jairo Del Valle OT GO 1                 Jairo Del Valle OT  3/21/2024    Electronically signed by Jairo Del Valle OT at 03/21/24 1059

## 2024-03-26 LAB
GLUCOSE BLDC GLUCOMTR-MCNC: 127 MG/DL (ref 70–130)
GLUCOSE BLDC GLUCOMTR-MCNC: 144 MG/DL (ref 70–130)
GLUCOSE BLDC GLUCOMTR-MCNC: 154 MG/DL (ref 70–130)
GLUCOSE BLDC GLUCOMTR-MCNC: 155 MG/DL (ref 70–130)
PYRIDOXAL PHOS SERPL-MCNC: 9.2 UG/L (ref 3.4–65.2)

## 2024-03-26 PROCEDURE — 97535 SELF CARE MNGMENT TRAINING: CPT

## 2024-03-26 PROCEDURE — 99232 SBSQ HOSP IP/OBS MODERATE 35: CPT | Performed by: INTERNAL MEDICINE

## 2024-03-26 PROCEDURE — 99232 SBSQ HOSP IP/OBS MODERATE 35: CPT | Performed by: STUDENT IN AN ORGANIZED HEALTH CARE EDUCATION/TRAINING PROGRAM

## 2024-03-26 PROCEDURE — 82948 REAGENT STRIP/BLOOD GLUCOSE: CPT

## 2024-03-26 PROCEDURE — 97530 THERAPEUTIC ACTIVITIES: CPT

## 2024-03-26 PROCEDURE — 63710000001 INSULIN LISPRO (HUMAN) PER 5 UNITS: Performed by: INTERNAL MEDICINE

## 2024-03-26 PROCEDURE — 97112 NEUROMUSCULAR REEDUCATION: CPT

## 2024-03-26 RX ORDER — FOLIC ACID 1 MG/1
1 TABLET ORAL DAILY
Status: DISCONTINUED | OUTPATIENT
Start: 2024-03-27 | End: 2024-03-27 | Stop reason: HOSPADM

## 2024-03-26 RX ORDER — OXAZEPAM 15 MG/1
15 CAPSULE ORAL EVERY 12 HOURS SCHEDULED
Status: DISCONTINUED | OUTPATIENT
Start: 2024-03-26 | End: 2024-03-27 | Stop reason: HOSPADM

## 2024-03-26 RX ORDER — PANTOPRAZOLE SODIUM 40 MG/1
40 TABLET, DELAYED RELEASE ORAL
Status: DISCONTINUED | OUTPATIENT
Start: 2024-03-26 | End: 2024-03-27 | Stop reason: HOSPADM

## 2024-03-26 RX ADMIN — NICOTINE 1 PATCH: 14 PATCH, EXTENDED RELEASE TRANSDERMAL at 17:52

## 2024-03-26 RX ADMIN — QUETIAPINE FUMARATE 50 MG: 25 TABLET ORAL at 21:41

## 2024-03-26 RX ADMIN — PANTOPRAZOLE SODIUM 40 MG: 40 INJECTION, POWDER, FOR SOLUTION INTRAVENOUS at 08:57

## 2024-03-26 RX ADMIN — Medication 10 ML: at 09:00

## 2024-03-26 RX ADMIN — INSULIN LISPRO 2 UNITS: 100 INJECTION, SOLUTION INTRAVENOUS; SUBCUTANEOUS at 21:41

## 2024-03-26 RX ADMIN — OXAZEPAM 15 MG: 15 CAPSULE, GELATIN COATED ORAL at 21:41

## 2024-03-26 RX ADMIN — ASPIRIN 81 MG: 81 TABLET, COATED ORAL at 08:57

## 2024-03-26 RX ADMIN — FOLIC ACID 1 MG: 5 INJECTION, SOLUTION INTRAMUSCULAR; INTRAVENOUS; SUBCUTANEOUS at 08:57

## 2024-03-26 RX ADMIN — INSULIN LISPRO 2 UNITS: 100 INJECTION, SOLUTION INTRAVENOUS; SUBCUTANEOUS at 11:28

## 2024-03-26 RX ADMIN — Medication 10 ML: at 08:59

## 2024-03-26 RX ADMIN — THIAMINE HCL TAB 100 MG 100 MG: 100 TAB at 08:57

## 2024-03-26 RX ADMIN — LOSARTAN POTASSIUM 50 MG: 50 TABLET, FILM COATED ORAL at 08:58

## 2024-03-26 RX ADMIN — FLUOXETINE 20 MG: 20 CAPSULE ORAL at 08:58

## 2024-03-26 RX ADMIN — SENNOSIDES AND DOCUSATE SODIUM 2 TABLET: 8.6; 5 TABLET ORAL at 08:58

## 2024-03-26 RX ADMIN — OXAZEPAM 15 MG: 15 CAPSULE, GELATIN COATED ORAL at 08:58

## 2024-03-26 RX ADMIN — Medication 1000 MCG: at 08:58

## 2024-03-26 RX ADMIN — PANTOPRAZOLE SODIUM 40 MG: 40 TABLET, DELAYED RELEASE ORAL at 17:52

## 2024-03-26 NOTE — DISCHARGE PLACEMENT REQUEST
"Patrice Heredia (78 y.o. Male)    Thank you  Ciara SHEPHERD, RN, [facility]  [phone]   Date of Birth   1945    Social Security Number       Address   [address] [facility] [address]    Home Phone   [phone]    MRN   [id number]       [employer]   [employer]    Marital Status   Single                            Admission Date   3/20/24    Admission Type   Emergency    Admitting Provider   Satish Quintanilla MD    Attending Provider   Satish Quintanilla MD    Department, Room/Bed   [facility] 3F, S313/1       Discharge Date       Discharge Disposition       Discharge Destination                                 Attending Provider: Satish Quintanilla MD    Allergies: No Known Allergies    Isolation: None   Infection: None   Code Status: CPR    Ht: 170.2 cm (67.01\")   Wt: 61.2 kg (134 lb 14.7 oz)    Admission Cmt: None   Principal Problem: Aphasia [R47.01]                   Active Insurance as of 3/20/2024       Primary Coverage       Payor Plan Insurance Group Employer/Plan Group    HUMANA MEDICARE REPLACEMENT HUMANA MED ADV PPO [id number]       Payor Plan Address Payor Plan Phone Number Payor Plan Fax Number Effective Dates    [address] [address] [phone]  1/1/2022 - None Entered    [facility] [place] [address]         Subscriber Name Subscriber Birth Date Member ID       PATRICE HEREDIA 1945 [id number]                     Emergency Contacts        (Rel.) Home Phone Work Phone Mobile Phone    NO HEREDIA (Son) [phone] -- [phone]    MIGUEL HEREDIA (Relative) [phone] -- [phone]              Insurance Information                  HUMANA MEDICARE REPLACEMENT/HUMANA MED ADV PPO Phone: [phone]    Subscriber: Patrice Heredia Subscriber#: [id number]    Group#: [id number] Precert#: --             History & Physical        Yanick Curtis MD at 03/20/24 [facility]4              [facility] Medicine Services  HISTORY AND PHYSICAL    Patient Name: Patrice" Anita  : 1945  MRN: [id number]  Primary Care Physician: Delano Song DO  Date of admission: 3/20/2024      Subjective  Subjective     Chief Complaint: AMS    HPI:  Patrice Howard is a 78 y.o. male with history of DM on Metformin, tobacco abuse, daily alcohol use, history of prostate cancer s/p resection, s/p partial XRT, and on Lupron, HTN, here with AMS. Per son, at bedside, he noted that he was altered this AM. Noted HA with N/V last night. Noted chills overnight per son. He's restless and agitated now. Today his son check on him and he had slurred speech with word finding difficulty, noted that he stumbled when he walked with L weakness, and couldn't discern that his cigarettes and lighter had been placed in his L hand. The patient speaks only intermittently. He is oriented to person only. He seems confused. He does note HA with coughing.     The son does note drinking beer and liquor daily    Personal History     Past Medical History:   Diagnosis Date    Diabetes     Elevated PSA     Prostate cancer            History reviewed. No pertinent surgical history.    Family History: NC    Social History:  reports that he has been smoking cigarettes. He has a 2 pack-year smoking history. He has never used smokeless tobacco. He reports current alcohol use of about 4.0 standard drinks of alcohol per week. He reports that he does not use drugs.  Social History     Social History Narrative    Not on file       Medications:  Available home medication information reviewed.  Caltrate 600+D Plus Minerals, aspirin, lisinopril, losartan, metFORMIN, sildenafil, and triamcinolone    No Known Allergies    Objective  Objective     Vital Signs:   Temp:  [98.5 °F (36.9 °C)] 98.5 °F (36.9 °C)  Heart Rate:  [68] 68  Resp:  [12-15] 12  BP: (159)/(85) 159/85  Total (NIH Stroke Scale): 4    Physical Exam   NAD, alert, oriented to person only  OP clear, dry MM  Neck supple  No LAD  RRR, no obvious murmur  Decreased at  bases, otherwise clear  +BS, ND, NT, soft  L facial droop, expressive aphasia, with slurred speech, seemingly LUE weakness 3/5, B LE weakness 4+/5, possibly decreased sensation in LUE, gait not tested      Result Review:  I have personally reviewed the results from the time of this admission to 3/20/2024 14:34 EDT and agree with these findings:  []  Laboratory list / accordion  []  Microbiology  []  Radiology  []  EKG/Telemetry   []  Cardiology/Vascular   []  Pathology  []  Old records  []  Other:  Most notable findings include: CT perfusion study abnormality noted, EKG NSR, UA with WBC 3-5, RBC 3-5, Troponin 11, WBC 8, Hgb 14.1, BMP pending      LAB RESULTS:      Lab 03/20/24  1253 03/20/24  1249   WBC 8.14  --    HEMOGLOBIN 14.1  --    HEMOGLOBIN, POC  --  14.6   HEMATOCRIT 40.1  --    HEMATOCRIT POC  --  43   PLATELETS 154  --    NEUTROS ABS 5.62  --    IMMATURE GRANS (ABS) 0.03  --    LYMPHS ABS 1.23  --    MONOS ABS 1.22*  --    EOS ABS 0.01  --    .2*  --    APTT 28.0  --          Lab 03/20/24  1249   CREATININE 0.90   EGFR 87.4         Lab 03/20/24  1253   ALT (SGPT) 27   AST (SGOT) 27         Lab 03/20/24  1253   HSTROP T 11                 UA          3/20/2024    13:45   Urinalysis   Squamous Epithelial Cells, UA 0-2    Specific Gravity, UA 1.075    Ketones, UA 15 mg/dL (1+)    Blood, UA Small (1+)    Leukocytes, UA Negative    Nitrite, UA Negative    RBC, UA 3-5    WBC, UA 3-5    Bacteria, UA None Seen        Microbiology Results (last 10 days)       ** No results found for the last 240 hours. **            CT Angiogram Head w AI Analysis of LVO    Result Date: 3/20/2024  CT ANGIOGRAM HEAD W AI ANALYSIS OF LVO, CT CEREBRAL PERFUSION W WO CONTRAST, CT ANGIOGRAM NECK Date of Exam: 3/20/2024 1:02 PM EDT Indication: Neuro deficit, acute stroke suspected Neuro deficit, acute stroke suspected. Comparison: None available. Technique: CTA of the head was performed after the uneventful intravenous  administration of . Reconstructed coronal and sagittal images were also obtained. In addition, a 3-D volume rendered image was created for interpretation. Automated exposure control and iterative reconstruction methods were used. FINDINGS: Vascular Findings: Atherosclerotic plaque within the right carotid bifurcation and right carotid siphon. The right common carotid, internal carotid, middle cerebral, anterior cerebral, vertebral, and posterior cerebral arteries are patent without abrupt cut off or aneurysmal dilation. There is absence of the A1 segment of the right anterior cerebral artery. There is fetal origin of the right posterior cerebral artery. Atherosclerotic plaque within the left carotid bifurcation and left carotid siphon. The left common carotid, internal carotid, middle cerebral, anterior cerebral, vertebral, and posterior cerebral arteries are patent without abrupt cut off or aneurysmal dilation. Basilar artery appears patent and appears unremarkable. Non-vascular Findings: For description of nonvascular intracranial findings, please refer to the noncontrast head CT performed the same date. No acute abnormality is identified within the visualized soft tissue or bony structures of the neck. Cervical spondylosis is present. The visualized lung apices are clear. CT Perfusion: CBF (<30%) volume: 0 mL Tmax (>6.0s) volume: 3 mL Mismatch volume: 3 mL Mismatch ratio: Infinite     Impression: 1.No intracranial large vessel occlusion is identified. 2.No significant stenosis of the bilateral internal carotid arteries. 3.CT perfusion study demonstrates a tiny focus of prolonged Tmax greater than 6 seconds (3 mL) within the right occipital lobe. This could be artifactual. Tiny focus of ischemia cannot be excluded. Please correlate with MRI. Electronically Signed: Maurice Lam MD  3/20/2024 1:48 PM EDT  Workstation ID: [id number]    CT Angiogram Neck    Result Date: 3/20/2024  CT ANGIOGRAM HEAD W AI ANALYSIS OF  LVO, CT CEREBRAL PERFUSION W WO CONTRAST, CT ANGIOGRAM NECK Date of Exam: 3/20/2024 1:02 PM EDT Indication: Neuro deficit, acute stroke suspected Neuro deficit, acute stroke suspected. Comparison: None available. Technique: CTA of the head was performed after the uneventful intravenous administration of . Reconstructed coronal and sagittal images were also obtained. In addition, a 3-D volume rendered image was created for interpretation. Automated exposure control and iterative reconstruction methods were used. FINDINGS: Vascular Findings: Atherosclerotic plaque within the right carotid bifurcation and right carotid siphon. The right common carotid, internal carotid, middle cerebral, anterior cerebral, vertebral, and posterior cerebral arteries are patent without abrupt cut off or aneurysmal dilation. There is absence of the A1 segment of the right anterior cerebral artery. There is fetal origin of the right posterior cerebral artery. Atherosclerotic plaque within the left carotid bifurcation and left carotid siphon. The left common carotid, internal carotid, middle cerebral, anterior cerebral, vertebral, and posterior cerebral arteries are patent without abrupt cut off or aneurysmal dilation. Basilar artery appears patent and appears unremarkable. Non-vascular Findings: For description of nonvascular intracranial findings, please refer to the noncontrast head CT performed the same date. No acute abnormality is identified within the visualized soft tissue or bony structures of the neck. Cervical spondylosis is present. The visualized lung apices are clear. CT Perfusion: CBF (<30%) volume: 0 mL Tmax (>6.0s) volume: 3 mL Mismatch volume: 3 mL Mismatch ratio: Infinite     Impression: 1.No intracranial large vessel occlusion is identified. 2.No significant stenosis of the bilateral internal carotid arteries. 3.CT perfusion study demonstrates a tiny focus of prolonged Tmax greater than 6 seconds (3 mL) within the right  occipital lobe. This could be artifactual. Tiny focus of ischemia cannot be excluded. Please correlate with MRI. Electronically Signed: Maurice Lam MD  3/20/2024 1:48 PM EDT  Workstation ID: [id number]    CT CEREBRAL PERFUSION WITH & WITHOUT CONTRAST    Result Date: 3/20/2024  CT ANGIOGRAM HEAD W AI ANALYSIS OF LVO, CT CEREBRAL PERFUSION W WO CONTRAST, CT ANGIOGRAM NECK Date of Exam: 3/20/2024 1:02 PM EDT Indication: Neuro deficit, acute stroke suspected Neuro deficit, acute stroke suspected. Comparison: None available. Technique: CTA of the head was performed after the uneventful intravenous administration of . Reconstructed coronal and sagittal images were also obtained. In addition, a 3-D volume rendered image was created for interpretation. Automated exposure control and iterative reconstruction methods were used. FINDINGS: Vascular Findings: Atherosclerotic plaque within the right carotid bifurcation and right carotid siphon. The right common carotid, internal carotid, middle cerebral, anterior cerebral, vertebral, and posterior cerebral arteries are patent without abrupt cut off or aneurysmal dilation. There is absence of the A1 segment of the right anterior cerebral artery. There is fetal origin of the right posterior cerebral artery. Atherosclerotic plaque within the left carotid bifurcation and left carotid siphon. The left common carotid, internal carotid, middle cerebral, anterior cerebral, vertebral, and posterior cerebral arteries are patent without abrupt cut off or aneurysmal dilation. Basilar artery appears patent and appears unremarkable. Non-vascular Findings: For description of nonvascular intracranial findings, please refer to the noncontrast head CT performed the same date. No acute abnormality is identified within the visualized soft tissue or bony structures of the neck. Cervical spondylosis is present. The visualized lung apices are clear. CT Perfusion: CBF (<30%) volume: 0 mL Tmax (>6.0s)  volume: 3 mL Mismatch volume: 3 mL Mismatch ratio: Infinite     Impression: 1.No intracranial large vessel occlusion is identified. 2.No significant stenosis of the bilateral internal carotid arteries. 3.CT perfusion study demonstrates a tiny focus of prolonged Tmax greater than 6 seconds (3 mL) within the right occipital lobe. This could be artifactual. Tiny focus of ischemia cannot be excluded. Please correlate with MRI. Electronically Signed: Maurice Lam MD  3/20/2024 1:48 PM EDT  Workstation ID: [id number]    XR Chest 1 View    Result Date: 3/20/2024  XR CHEST 1 VW Date of Exam: 3/20/2024 1:18 PM EDT Indication: Acute Stroke Protocol (onset < 12 hrs) Comparison: CT chest September 12, 2023 Findings: The heart not definitely enlarged. The lungs seem clear. There are no pleural effusions. The visualized osseous structures do not appear unusual.     Impression: Impression: 1.An acute pulmonary process is not apparent. Electronically Signed: Roger Herring MD  3/20/2024 1:37 PM EDT  Workstation ID: [id number]    CT Head Without Contrast Stroke Protocol    Result Date: 3/20/2024  CT HEAD WO CONTRAST STROKE PROTOCOL Date of Exam: 3/20/2024 12:59 PM EDT Indication: Neuro deficit, acute, stroke suspected Neuro deficit, acute stroke suspected. Comparison: None available. Technique: Axial CT images were obtained of the head without contrast administration.  Reconstructed coronal images were also obtained. Automated exposure control and iterative construction methods were used. Scan Time: 1256 hours Results discussed with stroke team at 1259 hours. Findings: There is mild atrophy. There is mild periventricular hypodensity compatible with chronic small vessel ischemic insult. There is no acute mass effect or edema. There is calcification of the right vertebral artery at the skull base. There is no acute mass effect or edema. There are no findings suspicious for acute CVA or hemorrhage. There is moderate polypoid mucosal  thickening of the inferior maxillary sinuses.     Impression: Impression: Chronic findings. No acute process. Electronically Signed: Earnestine Ramirez MD  3/20/2024 1:09 PM EDT  Workstation ID: [id number]         Assessment & Plan  Assessment & Plan       Aphasia    Alcohol use    History of prostate cancer    HTN (hypertension)    This is a 78 year old male with history of prostate cancer s/p resection, radiation, on Lupron, HTN, DM, tobacco use, here with AMS    AMS  Possible CVA  -CT perfusion abnormality ordered  -ASA/plavix ordered per stroke team  -statin per neurology team  -MRI pending    Recent N/V/HA  Cough  -respiratory PCR pending    Hx of ETOH use/possible dependence  -thiamine/folate  -benzodiazepines per CIWA protocol    Hx of prostate cancer  -s/p resection  -on Lupron    HTN  -permissive per stroke protocol/orders    Hx of DM  -SSI      DVT prophylaxis:  Mechanical DVT prophylaxis orders are signed and held.            CODE STATUS:    Code Status and Medical Interventions:   Ordered at: 03/20/24 1429     Code Status (Patient has no pulse and is not breathing):    CPR (Attempt to Resuscitate)     Medical Interventions (Patient has pulse or is breathing):    Full Support       Expected Discharge   Expected discharge date/ time has not been documented.     Yanick Curtis MD  03/20/24      Electronically signed by Yanick Curtis MD at 03/20/24 1443       Current Facility-Administered Medications   Medication Dose Route Frequency Provider Last Rate Last Admin    acetaminophen (TYLENOL) suppository 650 mg  650 mg Rectal Q6H PRN Vanesa Hutchison PA-C   650 mg at 03/21/24 0105    acetaminophen (TYLENOL) tablet 650 mg  650 mg Oral Q6H PRN Fabiano Albarran MD   650 mg at 03/21/24 1859    aspirin EC tablet 81 mg  81 mg Oral Daily Fabiano Albarran MD   81 mg at 03/26/24 0857    sennosides-docusate (PERICOLACE) 8.6-50 MG per tablet 2 tablet  2 tablet Oral BID PRN Yanick Curtis MD   2 tablet at  03/25/24 1620    And    polyethylene glycol (MIRALAX) packet 17 g  17 g Oral Daily PRN Yanick Curtis MD        And    bisacodyl (DULCOLAX) EC tablet 5 mg  5 mg Oral Daily PRN Yanick Curtis MD        And    bisacodyl (DULCOLAX) suppository 10 mg  10 mg Rectal Daily PRN Yanick Curtis MD        Calcium Replacement - Follow Nurse / BPA Driven Protocol   Does not apply PRN Yanick Curtis MD        dextrose (D50W) (25 g/50 mL) IV injection 25 g  25 g Intravenous Q15 Min PRN Fabiano Albarran MD        dextrose (GLUTOSE) oral gel 15 g  15 g Oral Q15 Min PRN Fabiano Albarran MD        FLUoxetine (PROzac) capsule 20 mg  20 mg Oral Daily Fabiano Albarran MD   20 mg at 03/26/24 0858    [START ON 3/27/2024] folic acid (FOLVITE) tablet 1 mg  1 mg Oral Daily Satish Quintanilla MD        glucagon (GLUCAGEN) injection 1 mg  1 mg Intramuscular Q15 Min PRN Yanick Curtis MD        Insulin Lispro (humaLOG) injection 2-7 Units  2-7 Units Subcutaneous 4x Daily AC & at Bedtime Fabiano Albarran MD   2 Units at 03/26/24 1128    losartan (COZAAR) tablet 50 mg  50 mg Oral Daily Fabiano Albarran MD   50 mg at 03/26/24 0858    Magnesium Standard Dose Replacement - Follow Nurse / BPA Driven Protocol   Does not apply Yanick Coates MD        nicotine (NICODERM CQ) 14 MG/24HR patch 1 patch  1 patch Transdermal Q24H Fabiano Albarran MD   1 patch at 03/25/24 1737    ondansetron ODT (ZOFRAN-ODT) disintegrating tablet 4 mg  4 mg Oral Q6H PRN Yanick Curtis MD        oxazepam (SERAX) capsule 15 mg  15 mg Oral Q12H Satish Quintanilla MD        pantoprazole (PROTONIX) EC tablet 40 mg  40 mg Oral BID AC Satish Quintanilla MD        Phosphorus Replacement - Follow Nurse / BPA Driven Protocol   Does not apply Yanick Coates MD        Potassium Replacement - Follow Nurse / BPA Driven Protocol   Does not apply Yanick Coates MD        prochlorperazine (COMPAZINE) injection 5 mg  5 mg Intravenous  Q6H PRN Yanick Curtis MD        Or    prochlorperazine (COMPAZINE) tablet 5 mg  5 mg Oral Q6H PRN Yanick Curtis MD        Or    prochlorperazine (COMPAZINE) suppository 25 mg  25 mg Rectal Q12H PRN Yanick Curtis MD        QUEtiapine (SEROquel) tablet 50 mg  50 mg Oral Nightly Fabiano Albarran MD   50 mg at 24 2144    sennosides-docusate (PERICOLACE) 8.6-50 MG per tablet 2 tablet  2 tablet Oral BID Satish Quintanilla MD   2 tablet at 24 0858    sodium chloride 0.9 % flush 10 mL  10 mL Intravenous PRN Jose Jimenez PA-C   10 mL at 24 0900    sodium chloride 0.9 % flush 10 mL  10 mL Intravenous Q12H Yanick Curtis MD   10 mL at 24 0859    sodium chloride 0.9 % infusion 40 mL  40 mL Intravenous PRN Yanick Curtis MD        thiamine (VITAMIN B-1) tablet 100 mg  100 mg Oral Daily TiKev quiroz MD   100 mg at 24 0857    vitamin B-12 (CYANOCOBALAMIN) tablet 1,000 mcg  1,000 mcg Oral Daily Fabiano Albarran MD   1,000 mcg at 24 0858    ziprasidone (GEODON) injection 10 mg  10 mg Intramuscular Q6H PRN Fabiano Albarran MD   10 mg at 24 0606        Physician Progress Notes (most recent note)        Satish Quintanilla MD at 24 1545              [facility] Medicine Services  PROGRESS NOTE    Patient Name: Patrice Howard  : 1945  MRN: [id number]    Date of Admission: 3/20/2024  Primary Care Physician: Delano Song DO    Subjective   Subjective     CC:  Follow-up for alcohol drawl    HPI:  Seen this AM.  Feels ok.  Reports still some tremors.  Son at bedside says that patient did better yesterday and did not have any hallucinations.  CIWA 0 this AM per nurse.     Objective   Objective     Vital Signs:   Temp:  [97.5 °F (36.4 °C)-98.5 °F (36.9 °C)] 97.7 °F (36.5 °C)  Heart Rate:  [59-75] 72  Resp:  [17-18] 18  BP: (122-170)/(80-97) 153/88     Physical Exam:  Constitutional: No acute distress, awake, alert,  sitting up in bed, son at bedside  HENT: NCAT, mucous membranes moist  Respiratory: Clear to auscultation bilaterally, respiratory effort normal   Cardiovascular: RRR, no murmurs, rubs, or gallops  Gastrointestinal: Positive bowel sounds, soft, nontender, nondistended  Musculoskeletal: No bilateral ankle edema  Psychiatric: Appropriate affect, cooperative  Neurologic: Oriented x 3, moves all extremities, Cranial Nerves grossly intact to confrontation, speech clear  Skin: No rashes      Results Reviewed:  LAB RESULTS:      Lab 03/25/24  0336 03/23/24  0819 03/22/24  0458 03/21/24  0700 03/20/24  1253   WBC 4.75 5.56 4.93 7.09 8.14   HEMOGLOBIN 12.5* 13.1 13.3 12.5* 14.1   HEMATOCRIT 35.2* 37.0* 36.4* 36.1* 40.1   PLATELETS 139* 125* 106* 118* 154   NEUTROS ABS 2.25 3.10 3.39 4.02 5.62   IMMATURE GRANS (ABS) 0.02 0.02 0.02 0.02 0.03   LYMPHS ABS 1.41 1.26 0.70 1.68 1.23   MONOS ABS 0.74 0.94* 0.77 1.34* 1.22*   EOS ABS 0.30 0.22 0.04 0.01 0.01   .8* 103.1* 100.8* 108.4* 104.2*   APTT  --   --   --   --  28.0         Lab 03/25/24  0336 03/23/24  0819 03/22/24  2037 03/22/24  0458 03/21/24  0700 03/20/24  1249   SODIUM 135* 135*  --  130* 132*  --    POTASSIUM 3.8 4.3 3.6 3.5 3.7  --    CHLORIDE 103 102  --  95* 98  --    CO2 24.0 23.0  --  24.0 22.0  --    ANION GAP 8.0 10.0  --  11.0 12.0  --    BUN 11 6*  --  10 11  --    CREATININE 0.68* 0.66*  --  0.69* 0.81 0.90   EGFR 95.1 96.0  --  94.7 90.2 87.4   GLUCOSE 120* 101*  --  153* 96  --    CALCIUM 9.1 8.9  --  8.5* 8.3*  --    MAGNESIUM 1.8 1.7  --  1.7  --   --    PHOSPHORUS  --  2.8  --  2.6  --   --    HEMOGLOBIN A1C  --   --   --   --  5.30  --    TSH  --   --   --   --  0.733  --          Lab 03/23/24  0819 03/22/24  0458 03/21/24  0700 03/20/24  1253   TOTAL PROTEIN 5.6* 6.0 5.9*  --    ALBUMIN 3.5 3.9 3.9  --    GLOBULIN 2.1 2.1 2.0  --    ALT (SGPT) 15 15 17 27   AST (SGOT) 21 19 19 27   BILIRUBIN 0.7 1.0 1.0  --    ALK PHOS 70 80 64  --          Lab  03/20/24  1253   HSTROP T 11         Lab 03/21/24  0700   CHOLESTEROL 131   LDL CHOL 50   HDL CHOL 63*   TRIGLYCERIDES 95         Lab 03/20/24  2225   FOLATE 9.98   VITAMIN B 12 279         Brief Urine Lab Results  (Last result in the past 365 days)        Color   Clarity   Blood   Leuk Est   Nitrite   Protein   CREAT   Urine HCG        03/20/24 1345 Yellow   Clear   Small (1+)   Negative   Negative   Negative                   Microbiology Results Abnormal       Procedure Component Value - Date/Time    COVID PRE-OP / PRE-PROCEDURE SCREENING ORDER (NO ISOLATION) - Swab, Nasopharynx [[id number]]  (Normal) Collected: 03/20/24 1346    Lab Status: Final result Specimen: Swab from Nasopharynx Updated: 03/20/24 1446    Narrative:      The following orders were created for panel order COVID PRE-OP / PRE-PROCEDURE SCREENING ORDER (NO ISOLATION) - Swab, Nasopharynx.  Procedure                               Abnormality         Status                     ---------                               -----------         ------                     Respiratory Panel PCR w/...[[id number]]  Normal              Final result                 Please view results for these tests on the individual orders.    Respiratory Panel PCR w/COVID-19(SARS-CoV-2) LEA/BRIGID/EMILY/PAD/COR/ZOFIA In-House, NP Swab in UTM/VTM, 2 HR TAT - Swab, Nasopharynx [[id number]]  (Normal) Collected: 03/20/24 1346    Lab Status: Final result Specimen: Swab from Nasopharynx Updated: 03/20/24 1446     ADENOVIRUS, PCR Not Detected     Coronavirus 229E Not Detected     Coronavirus HKU1 Not Detected     Coronavirus NL63 Not Detected     Coronavirus OC43 Not Detected     COVID19 Not Detected     Human Metapneumovirus Not Detected     Human Rhinovirus/Enterovirus Not Detected     Influenza A PCR Not Detected     Influenza B PCR Not Detected     Parainfluenza Virus 1 Not Detected     Parainfluenza Virus 2 Not Detected     Parainfluenza Virus 3 Not Detected     Parainfluenza Virus 4 Not  Detected     RSV, PCR Not Detected     Bordetella pertussis pcr Not Detected     Bordetella parapertussis PCR Not Detected     Chlamydophila pneumoniae PCR Not Detected     Mycoplasma pneumo by PCR Not Detected    Narrative:      In the setting of a positive respiratory panel with a viral infection PLUS a negative procalcitonin without other underlying concern for bacterial infection, consider observing off antibiotics or discontinuation of antibiotics and continue supportive care. If the respiratory panel is positive for atypical bacterial infection (Bordetella pertussis, Chlamydophila pneumoniae, or Mycoplasma pneumoniae), consider antibiotic de-escalation to target atypical bacterial infection.            MRI Brain With & Without Contrast    Result Date: 3/24/2024  MRI BRAIN W WO CONTRAST Date of Exam: 3/23/2024 9:16 PM EDT Indication: Stroke, follow up.  Comparison: None available. Technique:  Routine multiplanar/multisequence sequence images of the brain were obtained before and after the uneventful administration of 10 mL Multihance. Findings: There is mild generalized parenchymal volume loss. There is no diffusion restriction to suggest acute infarct. There is no evidence of acute or chronic intracranial hemorrhage.  No mass effect or midline shift. No abnormal extra-axial collections. There are patchy areas of FLAIR hyperintense signal within the cerebral white matter. No new signal abnormalities within the brain parenchyma. The major vascular flow voids appear intact.  The basal ganglia, brainstem and cerebellum appear within normal limits.  Calvarial and superficial soft tissue signal is within normal limits.  Orbits appear unremarkable. There is mild ethmoid and maxillary sinus mucosal disease. The mastoid air cells appear well aerated. Midline structures are intact.  There is no abnormal intracranial enhancement. Postcontrast images are limited by motion. Prominent central canal in the upper cervical  cord partially visualized.     Impression: Impression: 1.No acute intracranial abnormality. 2.Mild chronic small vessel ischemic change and mild generalized parenchymal volume loss. 3.Mild paranasal sinus mucosal disease. Electronically Signed: Jeanmarie Hairston MD  3/24/2024 12:39 AM EDT  Workstation ID: [id number]     Results for orders placed during the hospital encounter of 03/20/24    Adult Transthoracic Echo Complete W/ Cont if Necessary Per Protocol (With Agitated Saline)    Interpretation Summary    Left ventricular systolic function is normal. Left ventricular ejection fraction appears to be 56 - 60%.    Left atrial volume is normal. No evidence of a patent foramen ovale. Saline test results are negative.    Trace mitral valve regurgitation is present.    Trace tricuspid valve regurgitation is present. Estimated right ventricular systolic pressure from tricuspid regurgitation is normal (<35 mmHg).    Mild dilation of the ascending aorta is present. Ascending aorta = 4.2 cm      Current medications:  Scheduled Meds:aspirin, 81 mg, Oral, Daily  FLUoxetine, 20 mg, Oral, Daily  folic acid 1 mg in sodium chloride 0.9 % 50 mL IVPB, 1 mg, Intravenous, Daily  insulin lispro, 2-7 Units, Subcutaneous, 4x Daily AC & at Bedtime  losartan, 50 mg, Oral, Daily  nicotine, 1 patch, Transdermal, Q24H  oxazepam, 15 mg, Oral, Q8H  pantoprazole, 40 mg, Intravenous, Q12H  QUEtiapine, 50 mg, Oral, Nightly  sodium chloride, 10 mL, Intravenous, Q12H  thiamine, 100 mg, Oral, Daily  vitamin B-12, 1,000 mcg, Oral, Daily      Continuous Infusions:lactated ringers, 100 mL/hr, Last Rate: 100 mL/hr (03/24/24 0904)      PRN Meds:.  acetaminophen    acetaminophen    senna-docusate sodium **AND** polyethylene glycol **AND** bisacodyl **AND** bisacodyl    Calcium Replacement - Follow Nurse / BPA Driven Protocol    dextrose    dextrose    glucagon (human recombinant)    Magnesium Standard Dose Replacement - Follow Nurse / BPA Driven Protocol     ondansetron ODT    Phosphorus Replacement - Follow Nurse / BPA Driven Protocol    Potassium Replacement - Follow Nurse / BPA Driven Protocol    prochlorperazine **OR** prochlorperazine **OR** prochlorperazine    sodium chloride    sodium chloride    ziprasidone    Assessment & Plan   Assessment & Plan     Active Hospital Problems    Diagnosis  POA    **Aphasia [R47.01]  Yes    Moderate malnutrition [E44.0]  Yes    Fever [R50.9]  Yes    Acute metabolic encephalopathy [G93.41]  Yes    Alcohol use [Z78.9]  Unknown    History of prostate cancer [Z85.46]  Not Applicable    HTN (hypertension) [I10]  Unknown      Resolved Hospital Problems   No resolved problems to display.        Brief Hospital Course to date:  Patrice Howard is a 78 y.o. male with history of type 2 diabetes, ongoing tobacco and alcohol use, history of prostate cancer status post resection, essential hypertension, who presented to the hospital with slurred speech and left-sided weakness    TIA  Acute stroke, ruled out  Presented with left-sided weakness and slurred speech concerning for acute stroke  Stroke imaging studies including CT head and MRI brain negative  Continue aspirin and statins  Neurology team following repeated MRI brain on 3/23/2024 because of left hemineglect but MRI was unremarkable  No plan for LP given his improved mental state which is likely secondary to alcohol withdrawal and nutritional deficiencies    Acute metabolic encephalopathy, improving  Alcohol withdrawal, improving  B12 deficiency  Drinks at least 3 double shots of bourbon every night  Lives alone and independent with ADLs but feeling depressed and admits to using alcohol to treat his depression  Continue CIWA protocol.  Discontinued IV benzo  Continue Serax 15mg 3 times daily   Seroquel 50 mg HS  S/P high-dose IV thiamine, currently on PO  Continue IM B12 supplement.  Will need prescription upon discharge  Extensive discussion with the patient and his son at bedside  regarding alcohol dependence and withdrawal    Possible alcohol induced gastritis  Nausea and vomiting for a week with poor oral intake.  Currently improved  Continue Protonix    Possible UTI, POA  Has urinary symptoms with dysuria and urgency  Status post p.o. cefdinir    Hypoosmolar hyponatremia  Improved with IV fluids.  Encourage p.o. intake    Vitamin B12 deficiency  B12 level is low at 270  Continue IM replacement.    Essential pretension  Restarted losartan    Type 2 diabetes  A1c 5.3%  SSI    Hx of prostate cancer  Status post resection  on Lupron     Depression  Started Prozac    Severe debility  PT and OT consultation  Inpatient rehab upon discharge.  Case management on board    Expected Discharge Location and Transportation: Inpatient rehab  Expected Discharge   Expected Discharge Date: 3/26/2024; Expected Discharge Time:      DVT prophylaxis:  Mechanical DVT prophylaxis orders are present.         AM-PAC 6 Clicks Score (PT): 8 (24 0800)    CODE STATUS:   Code Status and Medical Interventions:   Ordered at: 24 1429     Code Status (Patient has no pulse and is not breathing):    CPR (Attempt to Resuscitate)     Medical Interventions (Patient has pulse or is breathing):    Full Support     Copied text in this note has been reviewed and is accurate as of 24.     Satish Quintanilla MD  24        Electronically signed by Satish Quintanilla MD at 24 2371          Physical Therapy Notes (most recent note)        Jeanne Unger, PT at 24 0931  Version 1 of 1         Patient Name: Patrice Howard  : 1945    MRN: [id number]                              Today's Date: 3/26/2024       Admit Date: 3/20/2024    Visit Dx:     ICD-10-CM ICD-9-CM   1. Altered mental status, unspecified altered mental status type  R41.82 780.97   2. Difficulty with speech  R47.9 784.59   3. Dysphagia, unspecified type  R13.10 787.20     Patient Active Problem List   Diagnosis    Aphasia    Alcohol  use    History of prostate cancer    HTN (hypertension)    Fever    Acute metabolic encephalopathy    Moderate malnutrition     Past Medical History:   Diagnosis Date    Diabetes     Elevated PSA     Prostate cancer      History reviewed. No pertinent surgical history.   General Information       Row Name 03/26/24 0959          Physical Therapy Time and Intention    Document Type therapy note (daily note)  -KR     Mode of Treatment physical therapy;individual therapy  -KR       Row Name 03/26/24 0959          General Information    Patient Profile Reviewed yes  -KR     Existing Precautions/Restrictions fall  -KR     Barriers to Rehab medically complex  -KR       Row Name 03/26/24 0959          Safety Issues, Functional Mobility    Safety Issues Affecting Function (Mobility) safety precaution awareness;insight into deficits/self-awareness;awareness of need for assistance  -KR     Impairments Affecting Function (Mobility) balance;endurance/activity tolerance;strength;visual/perceptual  -KR               User Key  (r) = Recorded By, (t) = Taken By, (c) = Cosigned By      Initials Name Provider Type    Jeanne Purvis PT Physical Therapist                   Mobility       Row Name 03/26/24 0959          Sit-Stand Transfer    Sit-Stand [place] (Transfers) contact guard  -KR     Comment, (Sit-Stand Transfer) 3x from chair  -KR       Row Name 03/26/24 0959          Gait/Stairs (Locomotion)    [place] Level (Gait) contact guard  -KR     Distance in Feet (Gait) 50  50 + 50 + 20 + 20  -KR     Deviations/Abnormal Patterns (Gait) stride length decreased;weight shifting decreased;gait speed decreased  -KR     Bilateral Gait Deviations forward flexed posture;heel strike decreased  -KR     Comment, (Gait/Stairs) slow, intentional pace, no LOB.  -KR               User Key  (r) = Recorded By, (t) = Taken By, (c) = Cosigned By      Initials Name Provider Type    Jeanne Purvis PT Physical Therapist               "     Obj/Interventions       Row Name 03/26/24 1001          Motor Skills    Additional Documentation Advanced Stepping/Walking Interventions (Group)  -KR       Row Name 03/26/24 1001          Balance    Balance Assessment sitting static balance;sitting dynamic balance;standing static balance;standing dynamic balance  -KR     Static Sitting Balance independent  -KR     Dynamic Sitting Balance standby assist  -KR     Position, Sitting Balance unsupported;sitting in chair  -KR     Static Standing Balance contact guard  -KR     Dynamic Standing Balance contact guard  -KR     Position/Device Used, Standing Balance unsupported  -KR     Balance Interventions sitting;standing;sit to stand;static;dynamic;other (see comments)  30\" WBOS E.C. CGA, 30\" NBOS E.C. CGA, 30\" semi-tandem LLE forward E.C. [place], 30\" semi-tandem RLE forward E.C. [place]  -KR       Row Name 03/26/24 1001          Advanced Stepping/Walking Interventions    Stepping/Walking Interventions backward walking;braiding;side stepping;tandem walking  -KR     Backward Walking (Stepping/Walking Interventions) 10' unilateral UE support, 10' unsupported CGA.  -KR     Braiding (Stepping/Walking Interventions) 10' B with BUE support and max cues for sequencing CGA  -KR     Tandem Walking (Stepping/Walking Interventions) 20' with unilateral UE support CGA  -KR     Side Stepping (Stepping/Walking Interventions) 10' B with BUE support CGA  -KR               User Key  (r) = Recorded By, (t) = Taken By, (c) = Cosigned By      Initials Name Provider Type    Jaenne Purvis, PT Physical Therapist                   Goals/Plan    No documentation.                  Clinical Impression       Row Name 03/26/24 1004          Pain    Pretreatment Pain Rating 0/10 - no pain  -KR     Posttreatment Pain Rating 0/10 - no pain  -KR       Row Name 03/26/24 1004          Plan of Care Review    Plan of Care Reviewed With patient;son  -KR     Progress improving  -KR     Outcome Evaluation Pt " with significant improvement in balance, transfers, and ambulation this date. Pt required less assist for all mobility tasks. Pt continue to demo deficits in static and dynamic standing balance and will continue to benefit from PT to address these ongoing deficits.  -KR       Row Name 03/26/24 1004          Vital Signs    Pre Patient Position Sitting  -KR     Intra Patient Position Standing  -KR     Post Patient Position Sitting  -KR       Row Name 03/26/24 1004          Positioning and Restraints    Pre-Treatment Position sitting in chair/recliner  -KR     Post Treatment Position chair  -KR     In Chair notified nsg;reclined;call light within reach;encouraged to call for assist;exit alarm on;waffle cushion;legs elevated;with family/caregiver  -KR               User Key  (r) = Recorded By, (t) = Taken By, (c) = Cosigned By      Initials Name Provider Type    Jeanne Purvis PT Physical Therapist                   Outcome Measures       Row Name 03/26/24 1005 03/26/24 0901       How much help from another person do you currently need...    Turning from your back to your side while in flat bed without using bedrails? 3  -KR 3  -AS    Moving from lying on back to sitting on the side of a flat bed without bedrails? 3  -KR 3  -AS    Moving to and from a bed to a chair (including a wheelchair)? 3  -KR 3  -AS    Standing up from a chair using your arms (e.g., wheelchair, bedside chair)? 3  -KR 4  -AS    Climbing 3-5 steps with a railing? 3  -KR 2  -AS    To walk in hospital room? 3  -KR 3  -AS    AM-PAC 6 Clicks Score (PT) 18  -KR 18  -AS    Highest Level of Mobility Goal 6 --> Walk 10 steps or more  -KR 6 --> Walk 10 steps or more  -AS              User Key  (r) = Recorded By, (t) = Taken By, (c) = Cosigned By      Initials Name Provider Type    Jeanne Purvis PT Physical Therapist    AS Julissa Velasquez RN Registered Nurse                                 Physical Therapy Education       Title: PT OT SLP  Therapies (In Progress)       Topic: Physical Therapy (Done)       Point: Mobility training (Done)       Learning Progress Summary             Patient Acceptance, E, VU by KR at 3/26/2024 1006    Acceptance, E, VU,NR by CD at 3/22/2024 1621    Comment: SEE FLOWSHEET    Acceptance, E, VU,NR by CD at 3/21/2024 1544    Comment: BENEFITS OF OOB ACTIVITY, SAFETY WITH MOBILITY, PROGRESSION OF POC, D/C PLANNING,   Family Acceptance, E, VU,NR by CD at 3/21/2024 1544    Comment: BENEFITS OF OOB ACTIVITY, SAFETY WITH MOBILITY, PROGRESSION OF POC, D/C PLANNING,                         Point: Home exercise program (Done)       Learning Progress Summary             Patient Acceptance, E, VU by KR at 3/26/2024 1006    Acceptance, E, VU,NR by CD at 3/22/2024 1621    Comment: SEE FLOWSHEET    Acceptance, E, VU,NR by CD at 3/21/2024 1544    Comment: BENEFITS OF OOB ACTIVITY, SAFETY WITH MOBILITY, PROGRESSION OF POC, D/C PLANNING,   Family Acceptance, E, VU,NR by CD at 3/21/2024 1544    Comment: BENEFITS OF OOB ACTIVITY, SAFETY WITH MOBILITY, PROGRESSION OF POC, D/C PLANNING,                         Point: Body mechanics (Done)       Learning Progress Summary             Patient Acceptance, E, VU by KR at 3/26/2024 1006    Acceptance, E, VU,NR by CD at 3/22/2024 1621    Comment: SEE FLOWSHEET    Acceptance, E, VU,NR by CD at 3/21/2024 1544    Comment: BENEFITS OF OOB ACTIVITY, SAFETY WITH MOBILITY, PROGRESSION OF POC, D/C PLANNING,   Family Acceptance, E, VU,NR by CD at 3/21/2024 1544    Comment: BENEFITS OF OOB ACTIVITY, SAFETY WITH MOBILITY, PROGRESSION OF POC, D/C PLANNING,                         Point: Precautions (Done)       Learning Progress Summary             Patient Acceptance, E, VU by KR at 3/26/2024 1006    Acceptance, E, VU,NR by CD at 3/22/2024 1621    Comment: SEE FLOWSHEET    Acceptance, E, VU,NR by CD at 3/21/2024 1544    Comment: BENEFITS OF OOB ACTIVITY, SAFETY WITH MOBILITY, PROGRESSION OF POC, D/C PLANNING,    Family Acceptance, E, VU,NR by CD at 3/21/2024 1544    Comment: BENEFITS OF OOB ACTIVITY, SAFETY WITH MOBILITY, PROGRESSION OF POC, D/C PLANNING,                                         User Key       Initials Effective Dates Name Provider Type Discipline    CD 02/03/23 -  Antonieta Sousa PT Physical Therapist PT    KR 12/30/22 -  Jeanne Unger PT Physical Therapist PT                  PT Recommendation and Plan     Plan of Care Reviewed With: patient, son  Progress: improving  Outcome Evaluation: Pt with significant improvement in balance, transfers, and ambulation this date. Pt required less assist for all mobility tasks. Pt continue to demo deficits in static and dynamic standing balance and will continue to benefit from PT to address these ongoing deficits.     Time Calculation:         PT Charges       Row Name 03/26/24 1007             Time Calculation    Start Time 0931  -KR      PT Received On 03/26/24  -KR         Timed Charges    20837 -  PT Neuromuscular Reeducation Minutes 13  -KR      74593 - PT Therapeutic Activity Minutes 10  -KR         Total Minutes    Timed Charges Total Minutes 23  -KR       Total Minutes 23  -KR                User Key  (r) = Recorded By, (t) = Taken By, (c) = Cosigned By      Initials Name Provider Type    Jeanne Purvis, AMARILIS Physical Therapist                  Therapy Charges for Today       Code Description Service Date Service Provider Modifiers Qty    95843535418 HC PT NEUROMUSC RE EDUCATION EA 15 MIN 3/26/2024 Jeanne Unger, PT GP 1    16658769054 HC PT THERAPEUTIC ACT EA 15 MIN 3/26/2024 Jeanne Unger, PT GP 1            PT G-Codes  Outcome Measure Options: Modified [place], AM-PAC 6 Clicks Basic Mobility (PT)  AM-PAC 6 Clicks Score (PT): 18  AM-PAC 6 Clicks Score (OT): 15  Modified [place] Scale: 4 - Moderately severe disability.  Unable to walk without assistance, and unable to attend to own bodily needs without assistance.       Jeanne Unger  PT  3/26/2024      Electronically signed by Jeanne Unger PT at 24 1008          Occupational Therapy Notes (most recent note)        Jadyn Yates OT at 24 0913          Patient Name: Patrice Howard  : 1945    MRN: [id number]                              Today's Date: 3/26/2024       Admit Date: 3/20/2024    Visit Dx:     ICD-10-CM ICD-9-CM   1. Altered mental status, unspecified altered mental status type  R41.82 780.97   2. Difficulty with speech  R47.9 784.59   3. Dysphagia, unspecified type  R13.10 787.20     Patient Active Problem List   Diagnosis    Aphasia    Alcohol use    History of prostate cancer    HTN (hypertension)    Fever    Acute metabolic encephalopathy    Moderate malnutrition     Past Medical History:   Diagnosis Date    Diabetes     Elevated PSA     Prostate cancer      History reviewed. No pertinent surgical history.   General Information       Row Name 24 1009          OT Time and Intention    Document Type therapy note (daily note)  -     Mode of Treatment occupational therapy  -       Row Name 24 1009          General Information    Patient Profile Reviewed yes  -     Existing Precautions/Restrictions fall  -     Barriers to Rehab medically complex  -       Row Name 24 1009          Safety Issues, Functional Mobility    Safety Issues Affecting Function (Mobility) awareness of need for assistance;insight into deficits/self-awareness;safety precaution awareness  -     Impairments Affecting Function (Mobility) balance;endurance/activity tolerance;strength;visual/perceptual  -               User Key  (r) = Recorded By, (t) = Taken By, (c) = Cosigned By      Initials Name Provider Type     Jadyn Yates OT Occupational Therapist                     Mobility/ADL's       Row Name 24 1009          Bed Mobility    Supine-Sit [place] (Bed Mobility) verbal cues;supervision  -       Row Name 24 1009          Transfers     Transfers sit-stand transfer;stand-sit transfer  -KL       Row Name 03/26/24 1009          Sit-Stand Transfer    Sit-Stand [place] (Transfers) contact guard;verbal cues  -KL     Assistive Device (Sit-Stand Transfers) other (see comments)  HHA  -KL       Row Name 03/26/24 1009          Stand-Sit Transfer    Stand-Sit [place] (Transfers) verbal cues;contact guard  -KL     Assistive Device (Stand-Sit Transfers) other (see comments)  HHA  -KL       Row Name 03/26/24 1009          Activities of Daily Living    BADL Assessment/Intervention grooming;lower body dressing  -KL       Row Name 03/26/24 1009          Lower Body Dressing Assessment/Training    [place] Level (Lower Body Dressing) don;socks;supervision  -KL     Position (Lower Body Dressing) edge of bed sitting  -KL       Row Name 03/26/24 1009          Grooming Assessment/Training    [place] Level (Grooming) oral care regimen;contact guard assist  -KL     Position (Grooming) sink side  -KL               User Key  (r) = Recorded By, (t) = Taken By, (c) = Cosigned By      Initials Name Provider Type    Jadyn Jeong OT Occupational Therapist                   Obj/Interventions       Row Name 03/26/24 1011          Balance    Static Sitting Balance independent  -KL     Dynamic Sitting Balance standby assist  -KL     Position, Sitting Balance unsupported;sitting edge of bed  -KL     Static Standing Balance contact guard  -KL     Dynamic Standing Balance contact guard  -KL     Position/Device Used, Standing Balance unsupported  -KL     Balance Interventions sitting;standing;sit to stand;supported;static;dynamic;occupation based/functional task  -               User Key  (r) = Recorded By, (t) = Taken By, (c) = Cosigned By      Initials Name Provider Type    Jadyn Jeong OT Occupational Therapist                   Goals/Plan    No documentation.                  Clinical Impression       Row Name 03/26/24 1011          Pain Assessment     Pretreatment Pain Rating 0/10 - no pain  -     Posttreatment Pain Rating 0/10 - no pain  -       Row Name 03/26/24 1011          Plan of Care Review    Plan of Care Reviewed With patient;son  -     Progress improving  -     Outcome Evaluation Pt demo significant improvement in mobility, self-care and t/fs during therapy session. Pt continues to demo decreased activity tolerance and CGA d/t static and dynamic balance. Will continue w/ current POC. d/c rec continues to be IPR.  -       Row Name 03/26/24 1011          Therapy Plan Review/Discharge Plan (OT)    Anticipated Discharge Disposition (OT) inpatient rehabilitation facility  -       Row Name 03/26/24 1011          Vital Signs    Pre Systolic BP Rehab 155  -KL     Pre Treatment Diastolic BP 96  -KL     Pretreatment Heart Rate (beats/min) 65  -KL     Pre SpO2 (%) 98  -KL     O2 Delivery Pre Treatment room air  -     Pre Patient Position Supine  -     Intra Patient Position Standing  -     Post Patient Position Sitting  -       Row Name 03/26/24 1011          Positioning and Restraints    Pre-Treatment Position in bed  -KL     Post Treatment Position chair  -     In Chair notified nsg;reclined;sitting;call light within reach;encouraged to call for assist;exit alarm on;with family/caregiver;waffle cushion;legs elevated  -               User Key  (r) = Recorded By, (t) = Taken By, (c) = Cosigned By      Initials Name Provider Type    Jadyn Jeong, CHEIKH Occupational Therapist                   Outcome Measures       Row Name 03/26/24 1015          How much help from another is currently needed...    Putting on and taking off regular lower body clothing? 3  -KL     Bathing (including washing, rinsing, and drying) 3  -KL     Toileting (which includes using toilet bed pan or urinal) 3  -KL     Putting on and taking off regular upper body clothing 4  -KL     Taking care of personal grooming (such as brushing teeth) 4  -KL     Eating meals 4  -KL      AM-PAC 6 Clicks Score (OT) 21  -KL       Row Name 03/26/24 1005 03/26/24 0901       How much help from another person do you currently need...    Turning from your back to your side while in flat bed without using bedrails? 3  -KR 3  -AS    Moving from lying on back to sitting on the side of a flat bed without bedrails? 3  -KR 3  -AS    Moving to and from a bed to a chair (including a wheelchair)? 3  -KR 3  -AS    Standing up from a chair using your arms (e.g., wheelchair, bedside chair)? 3  -KR 4  -AS    Climbing 3-5 steps with a railing? 3  -KR 2  -AS    To walk in hospital room? 3  -KR 3  -AS    AM-PAC 6 Clicks Score (PT) 18  -KR 18  -AS    Highest Level of Mobility Goal 6 --> Walk 10 steps or more  -KR 6 --> Walk 10 steps or more  -AS      Row Name 03/26/24 1015          Functional Assessment    Outcome Measure Options AM-[facility] 6 Clicks Daily Activity (OT)  -ANH               User Key  (r) = Recorded By, (t) = Taken By, (c) = Cosigned By      Initials Name Provider Type    Jeanne Purvis, PT Physical Therapist    AS Julissa Velasquez, RN Registered Nurse    Jadyn Jeong OT Occupational Therapist                    Occupational Therapy Education       Title: PT OT SLP Therapies (In Progress)       Topic: Occupational Therapy (In Progress)       Point: ADL training (Done)       Description:   Instruct learner(s) on proper safety adaptation and remediation techniques during self care or transfers.   Instruct in proper use of assistive devices.                  Learning Progress Summary             Patient Acceptance, E, VU,NR by ANH at 3/26/2024 1015    Acceptance, E,D, VU,DU by REBECCA at 3/21/2024 1044                         Point: Home exercise program (Not Started)       Description:   Instruct learner(s) on appropriate technique for monitoring, assisting and/or progressing therapeutic exercises/activities.                  Learner Progress:  Not documented in this visit.              Point: Precautions (Done)        Description:   Instruct learner(s) on prescribed precautions during self-care and functional transfers.                  Learning Progress Summary             Patient Acceptance, E, VU,NR by  at 3/26/2024 1015    Acceptance, E,D, VU,DU by  at 3/21/2024 1044                         Point: Body mechanics (Done)       Description:   Instruct learner(s) on proper positioning and spine alignment during self-care, functional mobility activities and/or exercises.                  Learning Progress Summary             Patient Acceptance, E, VU,NR by  at 3/26/2024 1015    Acceptance, E,D, VU,DU by  at 3/21/2024 1044                                         User Key       Initials Effective Dates Name Provider Type Discipline     06/16/21 -  Jairo Del Valle OT Occupational Therapist OT     02/05/24 -  Jadyn Yates OT Occupational Therapist OT                  OT Recommendation and Plan     Plan of Care Review  Plan of Care Reviewed With: patient, son  Progress: improving  Outcome Evaluation: Pt demo significant improvement in mobility, self-care and t/fs during therapy session. Pt continues to demo decreased activity tolerance and CGA d/t static and dynamic balance. Will continue w/ current POC. d/c rec continues to be IPR.     Time Calculation:         Time Calculation- OT       Row Name 03/26/24 1016             Time Calculation- OT    OT Start Time 0913  -KL      OT Received On 03/26/24  -         Timed Charges    88385 - OT Self Care/Mgmt Minutes 18  -KL         Total Minutes    Timed Charges Total Minutes 18  -KL       Total Minutes 18  -KL                User Key  (r) = Recorded By, (t) = Taken By, (c) = Cosigned By      Initials Name Provider Type     Jadyn Yates OT Occupational Therapist                  Therapy Charges for Today       Code Description Service Date Service Provider Modifiers Qty    [id number]  OT SELF CARE/MGMT/TRAIN EA 15 MIN 3/26/2024 Jadyn Yates OT GO 1                  Jadyn Yates, OT  3/26/2024    Electronically signed by Jadyn Yates, OT at 03/26/24 1016

## 2024-03-26 NOTE — CASE MANAGEMENT/SOCIAL WORK
Continued Stay Note  [facility]     Patient Name: Patrice Howard  MRN: [id number]  Today's Date: 3/26/2024    Admit Date: 3/20/2024    Plan: SNF   Discharge Plan       Row Name 03/26/24 1557       Plan    Plan SNF    Plan Comments [facility] is unable to offer any swing beds as facility is full. I reached out to other swing beds and no beds. Referrals given to [facility] and all the [place] facilities. I have spoken with patient and his son.    Final Discharge Disposition Code 03 - skilled nursing facility (SNF)                   Discharge Codes    No documentation.                 Expected Discharge Date and Time       Expected Discharge Date Expected Discharge Time    Mar 27, 2024               Ciara Disla RN

## 2024-03-26 NOTE — DISCHARGE PLACEMENT REQUEST
"Patrice Hereida (78 y.o. Male)          From Ciara MOSERN, RN, CCM    I asked the insurance group here to go ahead and submit for you all       Date of Birth   1945    Social Security Number       Address   [address] [place] [address]    Home Phone   [phone]    MRN   [id number]       [employer]ian   Nondenominational    Marital Status   Single                            Admission Date   3/20/24    Admission Type   Emergency    Admitting Provider   Satish Quintanilla MD    Attending Provider   Satish Quintanilla MD    Department, Room/Bed   [address], S[address]/1       Discharge Date       Discharge Disposition       Discharge Destination                                 Attending Provider: Satish Quintanilla MD    Allergies: No Known Allergies    Isolation: None   Infection: None   Code Status: CPR    Ht: 170.2 cm (67.01\")   Wt: 61.2 kg (134 lb 14.7 oz)    Admission Cmt: None   Principal Problem: Aphasia [R47.01]                   Active Insurance as of 3/20/2024       Primary Coverage       Payor Plan Insurance Group Employer/Plan Group    HUMANA MEDICARE REPLACEMENT HUMANA MED ADV PPO [id number]       Payor Plan Address Payor Plan Phone Number Payor Plan Fax Number Effective Dates    [address] [address] [phone]  1/1/2022 - None Entered    [facility] [address]         Subscriber Name Subscriber Birth Date Member ID       PATRICE HEREDIA 1945 [id number]                     Emergency Contacts        (Rel.) Home Phone Work Phone Mobile Phone    NO HEREDIA (Son) [phone] -- [phone]    MIGUEL HEREDIA (Relative) [phone] -- [phone]              Insurance Information                  HUMANA MEDICARE REPLACEMENT/HUMANA MED ADV PPO Phone: [phone]    Subscriber: Patrice Heredia Subscriber#: [id number]    Group#: [id number] Precert#: --             Physician Progress Notes (most recent note)        Satish Quintanilla MD at 03/25/24 3375              [facility] " [facility] Medicine Services  PROGRESS NOTE    Patient Name: Patrice Howard  : 1945  MRN: [id number]    Date of Admission: 3/20/2024  Primary Care Physician: Delano Song DO    Subjective   Subjective     CC:  Follow-up for alcohol drawl    HPI:  Seen this AM.  Feels ok.  Reports still some tremors.  Son at bedside says that patient did better yesterday and did not have any hallucinations.  CIWA 0 this AM per nurse.     Objective   Objective     Vital Signs:   Temp:  [97.5 °F (36.4 °C)-98.5 °F (36.9 °C)] 97.7 °F (36.5 °C)  Heart Rate:  [59-75] 72  Resp:  [17-18] 18  BP: (122-170)/(80-97) 153/88     Physical Exam:  Constitutional: No acute distress, awake, alert, sitting up in bed, son at bedside  HENT: NCAT, mucous membranes moist  Respiratory: Clear to auscultation bilaterally, respiratory effort normal   Cardiovascular: RRR, no murmurs, rubs, or gallops  Gastrointestinal: Positive bowel sounds, soft, nontender, nondistended  Musculoskeletal: No bilateral ankle edema  Psychiatric: Appropriate affect, cooperative  Neurologic: Oriented x 3, moves all extremities, Cranial Nerves grossly intact to confrontation, speech clear  Skin: No rashes      Results Reviewed:  LAB RESULTS:      Lab 24  0336 24  0819 24  0458 24  0700 24  1253   WBC 4.75 5.56 4.93 7.09 8.14   HEMOGLOBIN 12.5* 13.1 13.3 12.5* 14.1   HEMATOCRIT 35.2* 37.0* 36.4* 36.1* 40.1   PLATELETS 139* 125* 106* 118* 154   NEUTROS ABS 2.25 3.10 3.39 4.02 5.62   IMMATURE GRANS (ABS) 0.02 0.02 0.02 0.02 0.03   LYMPHS ABS 1.41 1.26 0.70 1.68 1.23   MONOS ABS 0.74 0.94* 0.77 1.34* 1.22*   EOS ABS 0.30 0.22 0.04 0.01 0.01   .8* 103.1* 100.8* 108.4* 104.2*   APTT  --   --   --   --  28.0         Lab 24  0336 2419 24  0458 24  0700 24  1249   SODIUM 135* 135*  --  130* 132*  --    POTASSIUM 3.8 4.3 3.6 3.5 3.7  --    CHLORIDE 103 102  --  95* 98  --    CO2 24.0  23.0  --  24.0 22.0  --    ANION GAP 8.0 10.0  --  11.0 12.0  --    BUN 11 6*  --  10 11  --    CREATININE 0.68* 0.66*  --  0.69* 0.81 0.90   EGFR 95.1 96.0  --  94.7 90.2 87.4   GLUCOSE 120* 101*  --  153* 96  --    CALCIUM 9.1 8.9  --  8.5* 8.3*  --    MAGNESIUM 1.8 1.7  --  1.7  --   --    PHOSPHORUS  --  2.8  --  2.6  --   --    HEMOGLOBIN A1C  --   --   --   --  5.30  --    TSH  --   --   --   --  0.733  --          Lab 03/23/24  0819 03/22/24  0458 03/21/24  0700 03/20/24  1253   TOTAL PROTEIN 5.6* 6.0 5.9*  --    ALBUMIN 3.5 3.9 3.9  --    GLOBULIN 2.1 2.1 2.0  --    ALT (SGPT) 15 15 17 27   AST (SGOT) 21 19 19 27   BILIRUBIN 0.7 1.0 1.0  --    ALK PHOS 70 80 64  --          Lab 03/20/24  1253   HSTROP T 11         Lab 03/21/24  0700   CHOLESTEROL 131   LDL CHOL 50   HDL CHOL 63*   TRIGLYCERIDES 95         Lab 03/20/24  2225   FOLATE 9.98   VITAMIN B 12 279         Brief Urine Lab Results  (Last result in the past 365 days)        Color   Clarity   Blood   Leuk Est   Nitrite   Protein   CREAT   Urine HCG        03/20/24 1345 Yellow   Clear   Small (1+)   Negative   Negative   Negative                   Microbiology Results Abnormal       Procedure Component Value - Date/Time    COVID PRE-OP / PRE-PROCEDURE SCREENING ORDER (NO ISOLATION) - Swab, Nasopharynx [[id number]]  (Normal) Collected: 03/20/24 1346    Lab Status: Final result Specimen: Swab from Nasopharynx Updated: 03/20/24 1446    Narrative:      The following orders were created for panel order COVID PRE-OP / PRE-PROCEDURE SCREENING ORDER (NO ISOLATION) - Swab, Nasopharynx.  Procedure                               Abnormality         Status                     ---------                               -----------         ------                     Respiratory Panel PCR w/...[[id number]]  Normal              Final result                 Please view results for these tests on the individual orders.    Respiratory Panel PCR w/COVID-19(SARS-CoV-2)  LEA/BRIGID/EMILY/PAD/COR/ZOFIA In-House, NP Swab in UTM/VTM, 2 HR TAT - Swab, Nasopharynx [[id number]]  (Normal) Collected: 03/20/24 1346    Lab Status: Final result Specimen: Swab from Nasopharynx Updated: 03/20/24 1446     ADENOVIRUS, PCR Not Detected     Coronavirus 229E Not Detected     Coronavirus HKU1 Not Detected     Coronavirus NL63 Not Detected     Coronavirus OC43 Not Detected     COVID19 Not Detected     Human Metapneumovirus Not Detected     Human Rhinovirus/Enterovirus Not Detected     Influenza A PCR Not Detected     Influenza B PCR Not Detected     Parainfluenza Virus 1 Not Detected     Parainfluenza Virus 2 Not Detected     Parainfluenza Virus 3 Not Detected     Parainfluenza Virus 4 Not Detected     RSV, PCR Not Detected     Bordetella pertussis pcr Not Detected     Bordetella parapertussis PCR Not Detected     Chlamydophila pneumoniae PCR Not Detected     Mycoplasma pneumo by PCR Not Detected    Narrative:      In the setting of a positive respiratory panel with a viral infection PLUS a negative procalcitonin without other underlying concern for bacterial infection, consider observing off antibiotics or discontinuation of antibiotics and continue supportive care. If the respiratory panel is positive for atypical bacterial infection (Bordetella pertussis, Chlamydophila pneumoniae, or Mycoplasma pneumoniae), consider antibiotic de-escalation to target atypical bacterial infection.            MRI Brain With & Without Contrast    Result Date: 3/24/2024  MRI BRAIN W WO CONTRAST Date of Exam: 3/23/2024 9:16 PM EDT Indication: Stroke, follow up.  Comparison: None available. Technique:  Routine multiplanar/multisequence sequence images of the brain were obtained before and after the uneventful administration of 10 mL Multihance. Findings: There is mild generalized parenchymal volume loss. There is no diffusion restriction to suggest acute infarct. There is no evidence of acute or chronic intracranial hemorrhage.   No mass effect or midline shift. No abnormal extra-axial collections. There are patchy areas of FLAIR hyperintense signal within the cerebral white matter. No new signal abnormalities within the brain parenchyma. The major vascular flow voids appear intact.  The basal ganglia, brainstem and cerebellum appear within normal limits.  Calvarial and superficial soft tissue signal is within normal limits.  Orbits appear unremarkable. There is mild ethmoid and maxillary sinus mucosal disease. The mastoid air cells appear well aerated. Midline structures are intact.  There is no abnormal intracranial enhancement. Postcontrast images are limited by motion. Prominent central canal in the upper cervical cord partially visualized.     Impression: Impression: 1.No acute intracranial abnormality. 2.Mild chronic small vessel ischemic change and mild generalized parenchymal volume loss. 3.Mild paranasal sinus mucosal disease. Electronically Signed: Jeanmarie Hairston MD  3/24/2024 12:39 AM EDT  Workstation ID: [id number]     Results for orders placed during the hospital encounter of 03/20/24    Adult Transthoracic Echo Complete W/ Cont if Necessary Per Protocol (With Agitated Saline)    Interpretation Summary    Left ventricular systolic function is normal. Left ventricular ejection fraction appears to be 56 - 60%.    Left atrial volume is normal. No evidence of a patent foramen ovale. Saline test results are negative.    Trace mitral valve regurgitation is present.    Trace tricuspid valve regurgitation is present. Estimated right ventricular systolic pressure from tricuspid regurgitation is normal (<35 mmHg).    Mild dilation of the ascending aorta is present. Ascending aorta = 4.2 cm      Current medications:  Scheduled Meds:aspirin, 81 mg, Oral, Daily  FLUoxetine, 20 mg, Oral, Daily  folic acid 1 mg in sodium chloride 0.9 % 50 mL IVPB, 1 mg, Intravenous, Daily  insulin lispro, 2-7 Units, Subcutaneous, 4x Daily AC & at  Bedtime  losartan, 50 mg, Oral, Daily  nicotine, 1 patch, Transdermal, Q24H  oxazepam, 15 mg, Oral, Q8H  pantoprazole, 40 mg, Intravenous, Q12H  QUEtiapine, 50 mg, Oral, Nightly  sodium chloride, 10 mL, Intravenous, Q12H  thiamine, 100 mg, Oral, Daily  vitamin B-12, 1,000 mcg, Oral, Daily      Continuous Infusions:lactated ringers, 100 mL/hr, Last Rate: 100 mL/hr (03/24/24 0904)      PRN Meds:.  acetaminophen    acetaminophen    senna-docusate sodium **AND** polyethylene glycol **AND** bisacodyl **AND** bisacodyl    Calcium Replacement - Follow Nurse / BPA Driven Protocol    dextrose    dextrose    glucagon (human recombinant)    Magnesium Standard Dose Replacement - Follow Nurse / BPA Driven Protocol    ondansetron ODT    Phosphorus Replacement - Follow Nurse / BPA Driven Protocol    Potassium Replacement - Follow Nurse / BPA Driven Protocol    prochlorperazine **OR** prochlorperazine **OR** prochlorperazine    sodium chloride    sodium chloride    ziprasidone    Assessment & Plan   Assessment & Plan     Active Hospital Problems    Diagnosis  POA    **Aphasia [R47.01]  Yes    Moderate malnutrition [E44.0]  Yes    Fever [R50.9]  Yes    Acute metabolic encephalopathy [G93.41]  Yes    Alcohol use [Z78.9]  Unknown    History of prostate cancer [Z85.46]  Not Applicable    HTN (hypertension) [I10]  Unknown      Resolved Hospital Problems   No resolved problems to display.        Brief Hospital Course to date:  Patrice Howard is a 78 y.o. male with history of type 2 diabetes, ongoing tobacco and alcohol use, history of prostate cancer status post resection, essential hypertension, who presented to the hospital with slurred speech and left-sided weakness    TIA  Acute stroke, ruled out  Presented with left-sided weakness and slurred speech concerning for acute stroke  Stroke imaging studies including CT head and MRI brain negative  Continue aspirin and statins  Neurology team following repeated MRI brain on 3/23/2024  because of left hemineglect but MRI was unremarkable  No plan for LP given his improved mental state which is likely secondary to alcohol withdrawal and nutritional deficiencies    Acute metabolic encephalopathy, improving  Alcohol withdrawal, improving  B12 deficiency  Drinks at least 3 double shots of bourbon every night  Lives alone and independent with ADLs but feeling depressed and admits to using alcohol to treat his depression  Continue CIWA protocol.  Discontinued IV benzo  Continue Serax 15mg 3 times daily   Seroquel 50 mg HS  S/P high-dose IV thiamine, currently on PO  Continue IM B12 supplement.  Will need prescription upon discharge  Extensive discussion with the patient and his son at bedside regarding alcohol dependence and withdrawal    Possible alcohol induced gastritis  Nausea and vomiting for a week with poor oral intake.  Currently improved  Continue Protonix    Possible UTI, POA  Has urinary symptoms with dysuria and urgency  Status post p.o. cefdinir    Hypoosmolar hyponatremia  Improved with IV fluids.  Encourage p.o. intake    Vitamin B12 deficiency  B12 level is low at 270  Continue IM replacement.    Essential pretension  Restarted losartan    Type 2 diabetes  A1c 5.3%  SSI    Hx of prostate cancer  Status post resection  on Lupron     Depression  Started Prozac    Severe debility  PT and OT consultation  Inpatient rehab upon discharge.  Case management on board    Expected Discharge Location and Transportation: Inpatient rehab  Expected Discharge   Expected Discharge Date: 3/26/2024; Expected Discharge Time:      DVT prophylaxis:  Mechanical DVT prophylaxis orders are present.         AM-PAC 6 Clicks Score (PT): 8 (03/25/24 0800)    CODE STATUS:   Code Status and Medical Interventions:   Ordered at: 03/20/24 142     Code Status (Patient has no pulse and is not breathing):    CPR (Attempt to Resuscitate)     Medical Interventions (Patient has pulse or is breathing):    Full Support      Copied text in this note has been reviewed and is accurate as of 24.     Satish Quintanilla MD  24        Electronically signed by Satish Quintanilla MD at 24 1549          Physical Therapy Notes (most recent note)        Jeanne Unger PT at 24 0931  Version 1 of 1         Patient Name: Patrice Howard  : 1945    MRN: [id number]                              Today's Date: 3/26/2024       Admit Date: 3/20/2024    Visit Dx:     ICD-10-CM ICD-9-CM   1. Altered mental status, unspecified altered mental status type  R41.82 780.97   2. Difficulty with speech  R47.9 784.59   3. Dysphagia, unspecified type  R13.10 787.20     Patient Active Problem List   Diagnosis    Aphasia    Alcohol use    History of prostate cancer    HTN (hypertension)    Fever    Acute metabolic encephalopathy    Moderate malnutrition     Past Medical History:   Diagnosis Date    Diabetes     Elevated PSA     Prostate cancer      History reviewed. No pertinent surgical history.   General Information       Row Name 24 0959          Physical Therapy Time and Intention    Document Type therapy note (daily note)  -KR     Mode of Treatment physical therapy;individual therapy  -KR       Row Name 24 0959          General Information    Patient Profile Reviewed yes  -KR     Existing Precautions/Restrictions fall  -KR     Barriers to Rehab medically complex  -KR       Row Name 24 0959          Safety Issues, Functional Mobility    Safety Issues Affecting Function (Mobility) safety precaution awareness;insight into deficits/self-awareness;awareness of need for assistance  -KR     Impairments Affecting Function (Mobility) balance;endurance/activity tolerance;strength;visual/perceptual  -KR               User Key  (r) = Recorded By, (t) = Taken By, (c) = Cosigned By      Initials Name Provider Type    KR Jeanne Unger, PT Physical Therapist                   Mobility       Row Name 24 0959           "Sit-Stand Transfer    Sit-Stand [place] (Transfers) contact guard  -KR     Comment, (Sit-Stand Transfer) 3x from chair  -KR       Row Name 03/26/24 0959          Gait/Stairs (Locomotion)    [place] Level (Gait) contact guard  -KR     Distance in Feet (Gait) 50  50 + 50 + 20 + 20  -KR     Deviations/Abnormal Patterns (Gait) stride length decreased;weight shifting decreased;gait speed decreased  -KR     Bilateral Gait Deviations forward flexed posture;heel strike decreased  -KR     Comment, (Gait/Stairs) slow, intentional pace, no LOB.  -KR               User Key  (r) = Recorded By, (t) = Taken By, (c) = Cosigned By      Initials Name Provider Type    KR Jeanne Unger PT Physical Therapist                   Obj/Interventions       Row Name 03/26/24 1001          Motor Skills    Additional Documentation Advanced Stepping/Walking Interventions (Group)  -KR       Row Name 03/26/24 1001          Balance    Balance Assessment sitting static balance;sitting dynamic balance;standing static balance;standing dynamic balance  -KR     Static Sitting Balance independent  -KR     Dynamic Sitting Balance standby assist  -KR     Position, Sitting Balance unsupported;sitting in chair  -KR     Static Standing Balance contact guard  -KR     Dynamic Standing Balance contact guard  -KR     Position/Device Used, Standing Balance unsupported  -KR     Balance Interventions sitting;standing;sit to stand;static;dynamic;other (see comments)  30\" WBOS E.C. CGA, 30\" NBOS E.C. CGA, 30\" semi-tandem LLE forward E.C. [place], 30\" semi-tandem RLE forward E.C. [place]  -KR       Row Name 03/26/24 1001          Advanced Stepping/Walking Interventions    Stepping/Walking Interventions backward walking;braiding;side stepping;tandem walking  -KR     Backward Walking (Stepping/Walking Interventions) 10' unilateral UE support, 10' unsupported CGA.  -KR     Braiding (Stepping/Walking Interventions) 10' B with BUE support and max cues for sequencing " CGA  -KR     Tandem Walking (Stepping/Walking Interventions) 20' with unilateral UE support CGA  -KR     Side Stepping (Stepping/Walking Interventions) 10' B with BUE support CGA  -KR               User Key  (r) = Recorded By, (t) = Taken By, (c) = Cosigned By      Initials Name Provider Type    Jeanne Purvis, AMARILIS Physical Therapist                   Goals/Plan    No documentation.                  Clinical Impression       Row Name 03/26/24 1004          Pain    Pretreatment Pain Rating 0/10 - no pain  -KR     Posttreatment Pain Rating 0/10 - no pain  -KR       Row Name 03/26/24 1004          Plan of Care Review    Plan of Care Reviewed With patient;son  -KR     Progress improving  -KR     Outcome Evaluation Pt with significant improvement in balance, transfers, and ambulation this date. Pt required less assist for all mobility tasks. Pt continue to demo deficits in static and dynamic standing balance and will continue to benefit from PT to address these ongoing deficits.  -KR       Row Name 03/26/24 1004          Vital Signs    Pre Patient Position Sitting  -KR     Intra Patient Position Standing  -KR     Post Patient Position Sitting  -KR       Row Name 03/26/24 1004          Positioning and Restraints    Pre-Treatment Position sitting in chair/recliner  -KR     Post Treatment Position chair  -KR     In Chair notified nsg;reclined;call light within reach;encouraged to call for assist;exit alarm on;waffle cushion;legs elevated;with family/caregiver  -KR               User Key  (r) = Recorded By, (t) = Taken By, (c) = Cosigned By      Initials Name Provider Type    Jeanne Purvis, PT Physical Therapist                   Outcome Measures       Row Name 03/26/24 1005 03/26/24 0901       How much help from another person do you currently need...    Turning from your back to your side while in flat bed without using bedrails? 3  -KR 3  -AS    Moving from lying on back to sitting on the side of a flat bed  without bedrails? 3  -KR 3  -AS    Moving to and from a bed to a chair (including a wheelchair)? 3  -KR 3  -AS    Standing up from a chair using your arms (e.g., wheelchair, bedside chair)? 3  -KR 4  -AS    Climbing 3-5 steps with a railing? 3  -KR 2  -AS    To walk in hospital room? 3  -KR 3  -AS    AM-PAC 6 Clicks Score (PT) 18  -KR 18  -AS    Highest Level of Mobility Goal 6 --> Walk 10 steps or more  -KR 6 --> Walk 10 steps or more  -AS              User Key  (r) = Recorded By, (t) = Taken By, (c) = Cosigned By      Initials Name Provider Type    Jeanne Purvis, PT Physical Therapist    AS Julissa Velasquez, RN Registered Nurse                                 Physical Therapy Education       Title: PT OT SLP Therapies (In Progress)       Topic: Physical Therapy (Done)       Point: Mobility training (Done)       Learning Progress Summary             Patient Acceptance, E, VU by KR at 3/26/2024 1006    Acceptance, E, VU,NR by CD at 3/22/2024 1621    Comment: SEE FLOWSHEET    Acceptance, E, VU,NR by CD at 3/21/2024 1544    Comment: BENEFITS OF OOB ACTIVITY, SAFETY WITH MOBILITY, PROGRESSION OF POC, D/C PLANNING,   Family Acceptance, E, VU,NR by CD at 3/21/2024 1544    Comment: BENEFITS OF OOB ACTIVITY, SAFETY WITH MOBILITY, PROGRESSION OF POC, D/C PLANNING,                         Point: Home exercise program (Done)       Learning Progress Summary             Patient Acceptance, E, VU by KR at 3/26/2024 1006    Acceptance, E, VU,NR by CD at 3/22/2024 1621    Comment: SEE FLOWSHEET    Acceptance, E, VU,NR by CD at 3/21/2024 1544    Comment: BENEFITS OF OOB ACTIVITY, SAFETY WITH MOBILITY, PROGRESSION OF POC, D/C PLANNING,   Family Acceptance, E, VU,NR by CD at 3/21/2024 1544    Comment: BENEFITS OF OOB ACTIVITY, SAFETY WITH MOBILITY, PROGRESSION OF POC, D/C PLANNING,                         Point: Body mechanics (Done)       Learning Progress Summary             Patient Acceptance, E, VU by KR at 3/26/2024 1006     Acceptance, E, VU,NR by CD at 3/22/2024 1621    Comment: SEE FLOWSHEET    Acceptance, E, VU,NR by CD at 3/21/2024 1544    Comment: BENEFITS OF OOB ACTIVITY, SAFETY WITH MOBILITY, PROGRESSION OF POC, D/C PLANNING,   Family Acceptance, E, VU,NR by CD at 3/21/2024 1544    Comment: BENEFITS OF OOB ACTIVITY, SAFETY WITH MOBILITY, PROGRESSION OF POC, D/C PLANNING,                         Point: Precautions (Done)       Learning Progress Summary             Patient Acceptance, E, VU by KR at 3/26/2024 1006    Acceptance, E, VU,NR by CD at 3/22/2024 1621    Comment: SEE FLOWSHEET    Acceptance, E, VU,NR by CD at 3/21/2024 1544    Comment: BENEFITS OF OOB ACTIVITY, SAFETY WITH MOBILITY, PROGRESSION OF POC, D/C PLANNING,   Family Acceptance, E, VU,NR by CD at 3/21/2024 1544    Comment: BENEFITS OF OOB ACTIVITY, SAFETY WITH MOBILITY, PROGRESSION OF POC, D/C PLANNING,                                         User Key       Initials Effective Dates Name Provider Type Discipline    CD 02/03/23 -  Antonieta Sousa, PT Physical Therapist PT     12/30/22 -  Jeanne Unger PT Physical Therapist PT                  PT Recommendation and Plan     Plan of Care Reviewed With: patient, son  Progress: improving  Outcome Evaluation: Pt with significant improvement in balance, transfers, and ambulation this date. Pt required less assist for all mobility tasks. Pt continue to demo deficits in static and dynamic standing balance and will continue to benefit from PT to address these ongoing deficits.     Time Calculation:         PT Charges       Row Name 03/26/24 1007             Time Calculation    Start Time 0931  -KR      PT Received On 03/26/24  -KR         Timed Charges    66459 -  PT Neuromuscular Reeducation Minutes 13  -KR      12691 - PT Therapeutic Activity Minutes 10  -KR         Total Minutes    Timed Charges Total Minutes 23  -KR       Total Minutes 23  -KR                User Key  (r) = Recorded By, (t) = Taken By, (c) =  Cosigned By      Initials Name Provider Type    KR Jeanne Unger, PT Physical Therapist                  Therapy Charges for Today       Code Description Service Date Service Provider Modifiers Qty    38441684131 HC PT NEUROMUSC RE EDUCATION EA 15 MIN 3/26/2024 Jeanne Unger, PT GP 1    42482112608 HC PT THERAPEUTIC ACT EA 15 MIN 3/26/2024 Jeanne Unger PT GP 1            PT G-Codes  Outcome Measure Options: Modified [place], AM-PAC 6 Clicks Basic Mobility (PT)  AM-PAC 6 Clicks Score (PT): 18  AM-PAC 6 Clicks Score (OT): 15  Modified Susan Scale: 4 - Moderately severe disability.  Unable to walk without assistance, and unable to attend to own bodily needs without assistance.       Jeanne Unger PT  3/26/2024      Electronically signed by Jeanne Unger PT at 24 1008          Occupational Therapy Notes (most recent note)        Jadyn Yates, OT at 24 0913          Patient Name: Patrice Howard  : 1945    MRN: [id number]                              Today's Date: 3/26/2024       Admit Date: 3/20/2024    Visit Dx:     ICD-10-CM ICD-9-CM   1. Altered mental status, unspecified altered mental status type  R41.82 780.97   2. Difficulty with speech  R47.9 784.59   3. Dysphagia, unspecified type  R13.10 787.20     Patient Active Problem List   Diagnosis    Aphasia    Alcohol use    History of prostate cancer    HTN (hypertension)    Fever    Acute metabolic encephalopathy    Moderate malnutrition     Past Medical History:   Diagnosis Date    Diabetes     Elevated PSA     Prostate cancer      History reviewed. No pertinent surgical history.   General Information       Row Name 24 1009          OT Time and Intention    Document Type therapy note (daily note)  -KL     Mode of Treatment occupational therapy  -KL       Row Name 24 1009          General Information    Patient Profile Reviewed yes  -KL     Existing Precautions/Restrictions fall  -KL     Barriers to Rehab medically complex   -       Row Name 03/26/24 1009          Safety Issues, Functional Mobility    Safety Issues Affecting Function (Mobility) awareness of need for assistance;insight into deficits/self-awareness;safety precaution awareness  -     Impairments Affecting Function (Mobility) balance;endurance/activity tolerance;strength;visual/perceptual  -               User Key  (r) = Recorded By, (t) = Taken By, (c) = Cosigned By      Initials Name Provider Type    Jadyn Jeong OT Occupational Therapist                     Mobility/ADL's       Row Name 03/26/24 1009          Bed Mobility    Supine-Sit [place] (Bed Mobility) verbal cues;supervision  -[facility] Name 03/26/24 1009          Transfers    Transfers sit-stand transfer;stand-sit transfer  -[facility] Name 03/26/24 1009          Sit-Stand Transfer    Sit-Stand [place] (Transfers) contact guard;verbal cues  -     Assistive Device (Sit-Stand Transfers) other (see comments)  [facility] Name 03/26/24 1009          Stand-Sit Transfer    Stand-Sit [place] (Transfers) verbal cues;contact guard  -     Assistive Device (Stand-Sit Transfers) other (see comments)  [facility]  [facility] Name 03/26/24 1009          Activities of Daily Living    BADL Assessment/Intervention grooming;lower body dressing  -[facility] Name 03/26/24 1009          Lower Body Dressing Assessment/Training    [place] Level (Lower Body Dressing) don;socks;supervision  -     Position (Lower Body Dressing) edge of bed sitting  -[facility] Name 03/26/24 1009          Grooming Assessment/Training    [place] Level (Grooming) oral care regimen;contact guard assist  -     Position (Grooming) sink side  -               User Key  (r) = Recorded By, (t) = Taken By, (c) = Cosigned By      Initials Name Provider Type    Jadyn Jeong OT Occupational Therapist                   Obj/Interventions       Row Name 03/26/24 1011          Balance    Static Sitting Balance  independent  -     Dynamic Sitting Balance standby assist  -     Position, Sitting Balance unsupported;sitting edge of bed  -     Static Standing Balance contact guard  -     Dynamic Standing Balance contact guard  -     Position/Device Used, Standing Balance unsupported  -     Balance Interventions sitting;standing;sit to stand;supported;static;dynamic;occupation based/functional task  -               User Key  (r) = Recorded By, (t) = Taken By, (c) = Cosigned By      Initials Name Provider Type    Jadyn Jeong OT Occupational Therapist                   Goals/Plan    No documentation.                  Clinical Impression       Row Name 03/26/24 1011          Pain Assessment    Pretreatment Pain Rating 0/10 - no pain  -     Posttreatment Pain Rating 0/10 - no pain  -       Row Name 03/26/24 1011          Plan of Care Review    Plan of Care Reviewed With patient;son  -     Progress improving  -     Outcome Evaluation Pt demo significant improvement in mobility, self-care and t/fs during therapy session. Pt continues to demo decreased activity tolerance and CGA d/t static and dynamic balance. Will continue w/ current POC. d/c rec continues to be IPR.  -       Row Name 03/26/24 1011          Therapy Plan Review/Discharge Plan (OT)    Anticipated Discharge Disposition (OT) inpatient rehabilitation facility  -       Row Name 03/26/24 1011          Vital Signs    Pre Systolic BP Rehab 155  -KL     Pre Treatment Diastolic BP 96  -KL     Pretreatment Heart Rate (beats/min) 65  -KL     Pre SpO2 (%) 98  -KL     O2 Delivery Pre Treatment room air  -     Pre Patient Position Supine  -KL     Intra Patient Position Standing  -     Post Patient Position Sitting  -       Row Name 03/26/24 1011          Positioning and Restraints    Pre-Treatment Position in bed  -     Post Treatment Position chair  -     In Chair notified nsg;reclined;sitting;call light within reach;encouraged to call for  assist;exit alarm on;with family/caregiver;waffle cushion;legs elevated  -KL               User Key  (r) = Recorded By, (t) = Taken By, (c) = Cosigned By      Initials Name Provider Type    Jadyn Jeong OT Occupational Therapist                   Outcome Measures       Row Name 03/26/24 1015          How much help from another is currently needed...    Putting on and taking off regular lower body clothing? 3  -KL     Bathing (including washing, rinsing, and drying) 3  -KL     Toileting (which includes using toilet bed pan or urinal) 3  -KL     Putting on and taking off regular upper body clothing 4  -KL     Taking care of personal grooming (such as brushing teeth) 4  -KL     Eating meals 4  -KL     AM-PAC 6 Clicks Score (OT) 21  -KL       Row Name 03/26/24 1005 03/26/24 0901       How much help from another person do you currently need...    Turning from your back to your side while in flat bed without using bedrails? 3  -KR 3  -AS    Moving from lying on back to sitting on the side of a flat bed without bedrails? 3  -KR 3  -AS    Moving to and from a bed to a chair (including a wheelchair)? 3  -KR 3  -AS    Standing up from a chair using your arms (e.g., wheelchair, bedside chair)? 3  -KR 4  -AS    Climbing 3-5 steps with a railing? 3  -KR 2  -AS    To walk in hospital room? 3  -KR 3  -AS    AM-PAC 6 Clicks Score (PT) 18  -KR 18  -AS    Highest Level of Mobility Goal 6 --> Walk 10 steps or more  -KR 6 --> Walk 10 steps or more  -AS      Row Name 03/26/24 1015          Functional Assessment    Outcome Measure Options AM-PAC 6 Clicks Daily Activity (OT)  -               User Key  (r) = Recorded By, (t) = Taken By, (c) = Cosigned By      Initials Name Provider Type    Jeanne Purvis, PT Physical Therapist    AS Julissa Velasquez, RN Registered Nurse    Jadyn Jeong OT Occupational Therapist                    Occupational Therapy Education       Title: PT OT SLP Therapies (In Progress)       Topic:  Occupational Therapy (In Progress)       Point: ADL training (Done)       Description:   Instruct learner(s) on proper safety adaptation and remediation techniques during self care or transfers.   Instruct in proper use of assistive devices.                  Learning Progress Summary             Patient Acceptance, E, VU,NR by  at 3/26/2024 1015    Acceptance, E,D, VU,DU by  at 3/21/2024 1044                         Point: Home exercise program (Not Started)       Description:   Instruct learner(s) on appropriate technique for monitoring, assisting and/or progressing therapeutic exercises/activities.                  Learner Progress:  Not documented in this visit.              Point: Precautions (Done)       Description:   Instruct learner(s) on prescribed precautions during self-care and functional transfers.                  Learning Progress Summary             Patient Acceptance, E, VU,NR by  at 3/26/2024 1015    Acceptance, E,D, VU,DU by  at 3/21/2024 1044                         Point: Body mechanics (Done)       Description:   Instruct learner(s) on proper positioning and spine alignment during self-care, functional mobility activities and/or exercises.                  Learning Progress Summary             Patient Acceptance, E, VU,NR by  at 3/26/2024 1015    Acceptance, E,D, VU,DU by  at 3/21/2024 1044                                         User Key       Initials Effective Dates Name Provider Type Discipline     06/16/21 -  Jairo Del Valle OT Occupational Therapist OT     02/05/24 -  Jadyn Yates OT Occupational Therapist OT                  OT Recommendation and Plan     Plan of Care Review  Plan of Care Reviewed With: patient, son  Progress: improving  Outcome Evaluation: Pt demo significant improvement in mobility, self-care and t/fs during therapy session. Pt continues to demo decreased activity tolerance and CGA d/t static and dynamic balance. Will continue w/ current POC. d/c rec  continues to be IPR.     Time Calculation:         Time Calculation- OT       Row Name 03/26/24 1016             Time Calculation- OT    OT Start Time 0913  -KL      OT Received On 03/26/24  -KL         Timed Charges    60794 - OT Self Care/Mgmt Minutes 18  -KL         Total Minutes    Timed Charges Total Minutes 18  -KL       Total Minutes 18  -KL                User Key  (r) = Recorded By, (t) = Taken By, (c) = Cosigned By      Initials Name Provider Type     Jadyn Yates OT Occupational Therapist                  Therapy Charges for Today       Code Description Service Date Service Provider Modifiers Qty    [id number]  OT SELF CARE/MGMT/TRAIN EA 15 MIN 3/26/2024 Jadyn Yates OT GO 1                 Jadyn Yates OT  3/26/2024    Electronically signed by Jadyn Yates OT at 03/26/24 1016

## 2024-03-26 NOTE — PLAN OF CARE
Goal Outcome Evaluation:  Plan of Care Reviewed With: patient, son        Progress: improving  Outcome Evaluation: Pt demo significant improvement in mobility, self-care and t/fs during therapy session. Pt continues to demo decreased activity tolerance and CGA d/t static and dynamic balance. Will continue w/ current POC. d/c rec continues to be IPR.      Anticipated Discharge Disposition (OT): inpatient rehabilitation facility

## 2024-03-26 NOTE — PROGRESS NOTES
[facility] Neurology    Progress Note    Patient Name: Patrice Howard  : 1945  MRN: [id number]  Primary Care Physician:  Delano Song DO  Date of admission: 3/20/2024    Subjective     Reason for consult: Altered mental status    History of Present Illness   Patient doing well, waiting for rehab    Review of Systems   General: Negative for fever, nausea, or vomiting.   Neurological: Negative for headache, pain, or weakness.     Objective     Vitals:   Temp:  [97.6 °F (36.4 °C)-98 °F (36.7 °C)] 97.6 °F (36.4 °C)  Heart Rate:  [60-73] 71  Resp:  [16-18] 16  BP: (155-167)/(82-94) 167/92    Neurologic Exam   Mental status/Cognition: alert; oriented to person, place, month not to year.  Able to state days of the week backwards  Speech/language: fluent; comprehension intact    Cranial nerves: PERRL. EOMI. Face appears symmetric. No dysarthria.  Shoulder shrug symmetric.  Tongue protrudes midline    Motor: No drift in any extremities, able to raise all to antigravity.    Current Medications    Current Facility-Administered Medications:     acetaminophen (TYLENOL) suppository 650 mg, 650 mg, Rectal, Q6H PRN, Vanesa Hutchison PA-C, 650 mg at 24 0105    acetaminophen (TYLENOL) tablet 650 mg, 650 mg, Oral, Q6H PRN, Fabiano Albarran MD, 650 mg at 24 1859    aspirin EC tablet 81 mg, 81 mg, Oral, Daily, Fabiano Albarran MD, 81 mg at 24 0857    sennosides-docusate (PERICOLACE) 8.6-50 MG per tablet 2 tablet, 2 tablet, Oral, BID PRN, 2 tablet at 24 1620 **AND** polyethylene glycol (MIRALAX) packet 17 g, 17 g, Oral, Daily PRN **AND** bisacodyl (DULCOLAX) EC tablet 5 mg, 5 mg, Oral, Daily PRN **AND** bisacodyl (DULCOLAX) suppository 10 mg, 10 mg, Rectal, Daily PRN, Yanick Curtis MD    Calcium Replacement - Follow Nurse / BPA Driven Protocol, , Does not apply, Ever BORGES Marc P, MD    dextrose (D50W) (25 g/50 mL) IV injection 25 g, 25 g, Intravenous, Q15 Min PRNAvis,  Fabiano Mcgarry MD    dextrose (GLUTOSE) oral gel 15 g, 15 g, Oral, Q15 Min PRN, Fabiano Albarran MD    FLUoxetine (PROzac) capsule 20 mg, 20 mg, Oral, Daily, Fabiano Albarran MD, 20 mg at 03/26/24 0858    folic acid 1 mg in sodium chloride 0.9 % 50 mL IVPB, 1 mg, Intravenous, Daily, Yanick Curtis MD, Last Rate: 100 mL/hr at 03/26/24 0857, 1 mg at 03/26/24 0857    glucagon (GLUCAGEN) injection 1 mg, 1 mg, Intramuscular, Q15 Min PRN, Yanick Curtis MD    Insulin Lispro (humaLOG) injection 2-7 Units, 2-7 Units, Subcutaneous, 4x Daily AC & at Bedtime, Fabiano Albarran MD, 2 Units at 03/25/24 1334    losartan (COZAAR) tablet 50 mg, 50 mg, Oral, Daily, Fabiano Albarran MD, 50 mg at 03/26/24 0858    Magnesium Standard Dose Replacement - Follow Nurse / BPA Driven Protocol, , Does not apply, Ever BORGES Marc P, MD    nicotine (NICODERM CQ) 14 MG/24HR patch 1 patch, 1 patch, Transdermal, Q24H, Fabiano Albarran MD, 1 patch at 03/25/24 1737    ondansetron ODT (ZOFRAN-ODT) disintegrating tablet 4 mg, 4 mg, Oral, Q6H PRN, Yanick Curtis MD    oxazepam (SERAX) capsule 15 mg, 15 mg, Oral, Q8H, Fabiano Albarran MD, 15 mg at 03/26/24 0858    pantoprazole (PROTONIX) injection 40 mg, 40 mg, Intravenous, Q12H, Fabiano Albarran MD, 40 mg at 03/26/24 0857    Phosphorus Replacement - Follow Nurse / BPA Driven Protocol, , Does not apply, Ever BORGES Marc P, MD    Potassium Replacement - Follow Nurse / BPA Driven Protocol, , Does not apply, PREver SYKES Marc P, MD    prochlorperazine (COMPAZINE) injection 5 mg, 5 mg, Intravenous, Q6H PRN **OR** prochlorperazine (COMPAZINE) tablet 5 mg, 5 mg, Oral, Q6H PRN **OR** prochlorperazine (COMPAZINE) suppository 25 mg, 25 mg, Rectal, Q12H PRN, Yanick Curtis MD    QUEtiapine (SEROquel) tablet 50 mg, 50 mg, Oral, Nightly, YounisFabiano MD, 50 mg at 03/25/24 2144    sennosides-docusate (PERICOLACE) 8.6-50 MG per tablet 2 tablet, 2 tablet,  Oral, BID, Satish Quintanilla MD, 2 tablet at 03/26/24 0858    sodium chloride 0.9 % flush 10 mL, 10 mL, Intravenous, PRN, Jose Jimenez PA-C, 10 mL at 03/26/24 0900    sodium chloride 0.9 % flush 10 mL, 10 mL, Intravenous, Q12H, Yanick Curtis MD, 10 mL at 03/26/24 0859    sodium chloride 0.9 % infusion 40 mL, 40 mL, Intravenous, PRN, Yanick Curtis MD    thiamine (VITAMIN B-1) tablet 100 mg, 100 mg, Oral, Daily, Kev Rushing MD, 100 mg at 03/26/24 0857    vitamin B-12 (CYANOCOBALAMIN) tablet 1,000 mcg, 1,000 mcg, Oral, Daily, Fabiano Albarran MD, 1,000 mcg at 03/26/24 0858    ziprasidone (GEODON) injection 10 mg, 10 mg, Intramuscular, Q6H PRN, Fabiano Albarran MD, 10 mg at 03/22/24 0606    Laboratory Results:   Lab Results   Component Value Date    GLUCOSE 120 (H) 03/25/2024    CALCIUM 9.1 03/25/2024     (L) 03/25/2024    K 3.8 03/25/2024    CO2 24.0 03/25/2024     03/25/2024    BUN 11 03/25/2024    CREATININE 0.68 (L) 03/25/2024    BCR 16.2 03/25/2024    ANIONGAP 8.0 03/25/2024     Lab Results   Component Value Date    WBC 4.75 03/25/2024    HGB 12.5 (L) 03/25/2024    HCT 35.2 (L) 03/25/2024    .8 (H) 03/25/2024     (L) 03/25/2024     Lab Results   Component Value Date    CHOL 131 03/21/2024     Lab Results   Component Value Date    HDL 63 (H) 03/21/2024     Lab Results   Component Value Date    LDL 50 03/21/2024     Lab Results   Component Value Date    TRIG 95 03/21/2024     Lab Results   Component Value Date    HGBA1C 5.30 03/21/2024     Lab Results   Component Value Date    FOLATE 9.98 03/20/2024     Lab Results   Component Value Date    [id number] 279 03/20/2024         Images/Procedures   CT head 03/20/2024  Mild generalized atrophy.  Mild chronic small vessel ischemic disease.    CT angio brain and neck 3/20/2024  No LVO or significant stenosis.  CT perfusion 3/20/2024  Tiny focus of prolonged Tmax within right occipital lobe.  Possibly  artifactual.    CT head 3/20/2024  No acute intracranial abnormalities.    MRI brain with and without contrast 3/20/2024  Atrophy and chronic microvascular ischemic disease.    Routine EEG 3/21/2024  Normal study and awake and asleep states.    MRI brain with and without contrast 3/23/2024  No acute intracranial normality.  Mild chronic small vessel ischemic change and mild generalized parenchymal volume loss.    Assessment / Plan   Active Hospital Problems:  Headache, improved  Toxic metabolic encephalopathy, improved  Alcohol withdrawal  B12 deficiency  Strokelike symptoms    Brief Patient Summary:  Patrice Howard is a 78 y.o. male with history of type 2 diabetes, daily alcohol use, prostate cancer, hypertension presenting with altered mental status and headache.  Hospital course complicated by alcohol withdrawal requiring benzodiazepines, headache and low-grade fever, delirium.  CT head, CTA unremarkable.  CT perfusion shows small area of prolonged Tmax within the right occipital lobe, but MRI negative for acute stroke.  MRI brain repeated for left-sided strokelike symptoms, also negative for acute stroke.  Likely related to encephalopathy, alcohol withdrawal.  Overall mental status has improved and been stable the last few days.  He is pending rehab placement.     Plan:   -Seroquel 50 mg nightly  -Thiamine 100 mg daily  -Continue aspirin 81 mg daily  -CIWA precautions  -Geodon 10 mg every 6 hours as needed for agitation    General Neurology will see upon request.  Please reach out with any questions      I have discussed the above with the patient, family, bedside RN  Time spent with patient: 35 minutes in face-to-face evaluation and management of the patient.      Senthil Pereira DO, MS

## 2024-03-26 NOTE — DISCHARGE PLACEMENT REQUEST
"Patrice Heredia (78 y.o. Male)       Date of Birth   1945    Social Security Number       Address   [address] [address]    Home Phone   [phone]    MRN   [id number]       Mandaeism   Yarsani    Marital Status   Single                            Admission Date   3/20/24    Admission Type   Emergency    Admitting Provider   Satish Quintanilla MD    Attending Provider   Satish Quintanilla MD    Department, Room/Bed   [address], S313/1       Discharge Date       Discharge Disposition       Discharge Destination                                 Attending Provider: Satish Quintanilla MD    Allergies: No Known Allergies    Isolation: None   Infection: None   Code Status: CPR    Ht: 170.2 cm (67.01\")   Wt: 61.2 kg (134 lb 14.7 oz)    Admission Cmt: None   Principal Problem: Aphasia [R47.01]                   Active Insurance as of 3/20/2024       Primary Coverage       Payor Plan Insurance Group Employer/Plan Group    HUMANA MEDICARE REPLACEMENT HUMANA MED ADV PPO [id number]       Payor Plan Address Payor Plan Phone Number Payor Plan Fax Number Effective Dates    [address] [address] [phone]  2022 - None Entered    [facility] [id number]         Subscriber Name Subscriber Birth Date Member ID       PATRICE HEREDIA 1945 [id number]                     Emergency Contacts        (Rel.) Home Phone Work Phone Mobile Phone    NO HEREDIA (Son) [phone] -- [phone]    MIGUEL HEREDIA (Relative) [phone] -- [phone]                 History & Physical        Yanick Curtis MD at [address] [facility]              [facility] Medicine Services  HISTORY AND PHYSICAL    Patient Name: Patrice Heredia  : 1945  MRN: [id number]  Primary Care Physician: Delano Song DO  Date of admission: 3/20/2024      Subjective  Subjective     Chief Complaint: AMS    HPI:  Patrice Heredia is a 78 y.o. male with history of DM on Metformin, tobacco " abuse, daily alcohol use, history of prostate cancer s/p resection, s/p partial XRT, and on Lupron, HTN, here with AMS. Per son, at bedside, he noted that he was altered this AM. Noted HA with N/V last night. Noted chills overnight per son. He's restless and agitated now. Today his son check on him and he had slurred speech with word finding difficulty, noted that he stumbled when he walked with L weakness, and couldn't discern that his cigarettes and lighter had been placed in his L hand. The patient speaks only intermittently. He is oriented to person only. He seems confused. He does note HA with coughing.     The son does note drinking beer and liquor daily    Personal History     Past Medical History:   Diagnosis Date    Diabetes     Elevated PSA     Prostate cancer            History reviewed. No pertinent surgical history.    Family History: NC    Social History:  reports that he has been smoking cigarettes. He has a 2 pack-year smoking history. He has never used smokeless tobacco. He reports current alcohol use of about 4.0 standard drinks of alcohol per week. He reports that he does not use drugs.  Social History     Social History Narrative    Not on file       Medications:  Available home medication information reviewed.  Caltrate 600+D Plus Minerals, aspirin, lisinopril, losartan, metFORMIN, sildenafil, and triamcinolone    No Known Allergies    Objective  Objective     Vital Signs:   Temp:  [98.5 °F (36.9 °C)] 98.5 °F (36.9 °C)  Heart Rate:  [68] 68  Resp:  [12-15] 12  BP: (159)/(85) 159/85  Total (NIH Stroke Scale): 4    Physical Exam   NAD, alert, oriented to person only  OP clear, dry MM  Neck supple  No LAD  RRR, no obvious murmur  Decreased at bases, otherwise clear  +BS, ND, NT, soft  L facial droop, expressive aphasia, with slurred speech, seemingly LUE weakness 3/5, B LE weakness 4+/5, possibly decreased sensation in LUE, gait not tested      Result Review:  I have personally reviewed the results  from the time of this admission to 3/20/2024 14:34 EDT and agree with these findings:  []  Laboratory list / accordion  []  Microbiology  []  Radiology  []  EKG/Telemetry   []  Cardiology/Vascular   []  Pathology  []  Old records  []  Other:  Most notable findings include: CT perfusion study abnormality noted, EKG NSR, UA with WBC 3-5, RBC 3-5, Troponin 11, WBC 8, Hgb 14.1, BMP pending      LAB RESULTS:      Lab 03/20/24  1253 03/20/24  1249   WBC 8.14  --    HEMOGLOBIN 14.1  --    HEMOGLOBIN, POC  --  14.6   HEMATOCRIT 40.1  --    HEMATOCRIT POC  --  43   PLATELETS 154  --    NEUTROS ABS 5.62  --    IMMATURE GRANS (ABS) 0.03  --    LYMPHS ABS 1.23  --    MONOS ABS 1.22*  --    EOS ABS 0.01  --    .2*  --    APTT 28.0  --          Lab 03/20/24  1249   CREATININE 0.90   EGFR 87.4         Lab 03/20/24  1253   ALT (SGPT) 27   AST (SGOT) 27         Lab 03/20/24  1253   HSTROP T 11                 UA          3/20/2024    13:45   Urinalysis   Squamous Epithelial Cells, UA 0-2    Specific Gravity, UA 1.075    Ketones, UA 15 mg/dL (1+)    Blood, UA Small (1+)    Leukocytes, UA Negative    Nitrite, UA Negative    RBC, UA 3-5    WBC, UA 3-5    Bacteria, UA None Seen        Microbiology Results (last 10 days)       ** No results found for the last 240 hours. **            CT Angiogram Head w AI Analysis of LVO    Result Date: 3/20/2024  CT ANGIOGRAM HEAD W AI ANALYSIS OF LVO, CT CEREBRAL PERFUSION W WO CONTRAST, CT ANGIOGRAM NECK Date of Exam: 3/20/2024 1:02 PM EDT Indication: Neuro deficit, acute stroke suspected Neuro deficit, acute stroke suspected. Comparison: None available. Technique: CTA of the head was performed after the uneventful intravenous administration of . Reconstructed coronal and sagittal images were also obtained. In addition, a 3-D volume rendered image was created for interpretation. Automated exposure control and iterative reconstruction methods were used. FINDINGS: Vascular Findings:  Atherosclerotic plaque within the right carotid bifurcation and right carotid siphon. The right common carotid, internal carotid, middle cerebral, anterior cerebral, vertebral, and posterior cerebral arteries are patent without abrupt cut off or aneurysmal dilation. There is absence of the A1 segment of the right anterior cerebral artery. There is fetal origin of the right posterior cerebral artery. Atherosclerotic plaque within the left carotid bifurcation and left carotid siphon. The left common carotid, internal carotid, middle cerebral, anterior cerebral, vertebral, and posterior cerebral arteries are patent without abrupt cut off or aneurysmal dilation. Basilar artery appears patent and appears unremarkable. Non-vascular Findings: For description of nonvascular intracranial findings, please refer to the noncontrast head CT performed the same date. No acute abnormality is identified within the visualized soft tissue or bony structures of the neck. Cervical spondylosis is present. The visualized lung apices are clear. CT Perfusion: CBF (<30%) volume: 0 mL Tmax (>6.0s) volume: 3 mL Mismatch volume: 3 mL Mismatch ratio: Infinite     Impression: 1.No intracranial large vessel occlusion is identified. 2.No significant stenosis of the bilateral internal carotid arteries. 3.CT perfusion study demonstrates a tiny focus of prolonged Tmax greater than 6 seconds (3 mL) within the right occipital lobe. This could be artifactual. Tiny focus of ischemia cannot be excluded. Please correlate with MRI. Electronically Signed: Maurice Lam MD  3/20/2024 1:48 PM EDT  Workstation ID: [id number]    CT Angiogram Neck    Result Date: 3/20/2024  CT ANGIOGRAM HEAD W AI ANALYSIS OF LVO, CT CEREBRAL PERFUSION W WO CONTRAST, CT ANGIOGRAM NECK Date of Exam: 3/20/2024 1:02 PM EDT Indication: Neuro deficit, acute stroke suspected Neuro deficit, acute stroke suspected. Comparison: None available. Technique: CTA of the head was performed after  the uneventful intravenous administration of . Reconstructed coronal and sagittal images were also obtained. In addition, a 3-D volume rendered image was created for interpretation. Automated exposure control and iterative reconstruction methods were used. FINDINGS: Vascular Findings: Atherosclerotic plaque within the right carotid bifurcation and right carotid siphon. The right common carotid, internal carotid, middle cerebral, anterior cerebral, vertebral, and posterior cerebral arteries are patent without abrupt cut off or aneurysmal dilation. There is absence of the A1 segment of the right anterior cerebral artery. There is fetal origin of the right posterior cerebral artery. Atherosclerotic plaque within the left carotid bifurcation and left carotid siphon. The left common carotid, internal carotid, middle cerebral, anterior cerebral, vertebral, and posterior cerebral arteries are patent without abrupt cut off or aneurysmal dilation. Basilar artery appears patent and appears unremarkable. Non-vascular Findings: For description of nonvascular intracranial findings, please refer to the noncontrast head CT performed the same date. No acute abnormality is identified within the visualized soft tissue or bony structures of the neck. Cervical spondylosis is present. The visualized lung apices are clear. CT Perfusion: CBF (<30%) volume: 0 mL Tmax (>6.0s) volume: 3 mL Mismatch volume: 3 mL Mismatch ratio: Infinite     Impression: 1.No intracranial large vessel occlusion is identified. 2.No significant stenosis of the bilateral internal carotid arteries. 3.CT perfusion study demonstrates a tiny focus of prolonged Tmax greater than 6 seconds (3 mL) within the right occipital lobe. This could be artifactual. Tiny focus of ischemia cannot be excluded. Please correlate with MRI. Electronically Signed: Maurice Lam MD  3/20/2024 1:48 PM EDT  Workstation ID: [id number]    CT CEREBRAL PERFUSION WITH & WITHOUT  CONTRAST    Result Date: 3/20/2024  CT ANGIOGRAM HEAD W AI ANALYSIS OF LVO, CT CEREBRAL PERFUSION W WO CONTRAST, CT ANGIOGRAM NECK Date of Exam: 3/20/2024 1:02 PM EDT Indication: Neuro deficit, acute stroke suspected Neuro deficit, acute stroke suspected. Comparison: None available. Technique: CTA of the head was performed after the uneventful intravenous administration of . Reconstructed coronal and sagittal images were also obtained. In addition, a 3-D volume rendered image was created for interpretation. Automated exposure control and iterative reconstruction methods were used. FINDINGS: Vascular Findings: Atherosclerotic plaque within the right carotid bifurcation and right carotid siphon. The right common carotid, internal carotid, middle cerebral, anterior cerebral, vertebral, and posterior cerebral arteries are patent without abrupt cut off or aneurysmal dilation. There is absence of the A1 segment of the right anterior cerebral artery. There is fetal origin of the right posterior cerebral artery. Atherosclerotic plaque within the left carotid bifurcation and left carotid siphon. The left common carotid, internal carotid, middle cerebral, anterior cerebral, vertebral, and posterior cerebral arteries are patent without abrupt cut off or aneurysmal dilation. Basilar artery appears patent and appears unremarkable. Non-vascular Findings: For description of nonvascular intracranial findings, please refer to the noncontrast head CT performed the same date. No acute abnormality is identified within the visualized soft tissue or bony structures of the neck. Cervical spondylosis is present. The visualized lung apices are clear. CT Perfusion: CBF (<30%) volume: 0 mL Tmax (>6.0s) volume: 3 mL Mismatch volume: 3 mL Mismatch ratio: Infinite     Impression: 1.No intracranial large vessel occlusion is identified. 2.No significant stenosis of the bilateral internal carotid arteries. 3.CT perfusion study demonstrates a tiny  focus of prolonged Tmax greater than 6 seconds (3 mL) within the right occipital lobe. This could be artifactual. Tiny focus of ischemia cannot be excluded. Please correlate with MRI. Electronically Signed: Maurice Lam MD  3/20/2024 1:48 PM EDT  Workstation ID: [id number]    XR Chest 1 View    Result Date: 3/20/2024  XR CHEST 1 VW Date of Exam: 3/20/2024 1:18 PM EDT Indication: Acute Stroke Protocol (onset < 12 hrs) Comparison: CT chest September 12, 2023 Findings: The heart not definitely enlarged. The lungs seem clear. There are no pleural effusions. The visualized osseous structures do not appear unusual.     Impression: Impression: 1.An acute pulmonary process is not apparent. Electronically Signed: Roger Herring MD  3/20/2024 1:37 PM EDT  Workstation ID: [id number]    CT Head Without Contrast Stroke Protocol    Result Date: 3/20/2024  CT HEAD WO CONTRAST STROKE PROTOCOL Date of Exam: 3/20/2024 12:59 PM EDT Indication: Neuro deficit, acute, stroke suspected Neuro deficit, acute stroke suspected. Comparison: None available. Technique: Axial CT images were obtained of the head without contrast administration.  Reconstructed coronal images were also obtained. Automated exposure control and iterative construction methods were used. Scan Time: 1256 hours Results discussed with stroke team at 1259 hours. Findings: There is mild atrophy. There is mild periventricular hypodensity compatible with chronic small vessel ischemic insult. There is no acute mass effect or edema. There is calcification of the right vertebral artery at the skull base. There is no acute mass effect or edema. There are no findings suspicious for acute CVA or hemorrhage. There is moderate polypoid mucosal thickening of the inferior maxillary sinuses.     Impression: Impression: Chronic findings. No acute process. Electronically Signed: Earnestine Ramirez MD  3/20/2024 1:09 PM EDT  Workstation ID: [id number]         Assessment & Plan  Assessment & Plan        Aphasia    Alcohol use    History of prostate cancer    HTN (hypertension)    This is a 78 year old male with history of prostate cancer s/p resection, radiation, on Lupron, HTN, DM, tobacco use, here with AMS    AMS  Possible CVA  -CT perfusion abnormality ordered  -ASA/plavix ordered per stroke team  -statin per neurology team  -MRI pending    Recent N/V/HA  Cough  -respiratory PCR pending    Hx of ETOH use/possible dependence  -thiamine/folate  -benzodiazepines per CIWA protocol    Hx of prostate cancer  -s/p resection  -on Lupron    HTN  -permissive per stroke protocol/orders    Hx of DM  -SSI      DVT prophylaxis:  Mechanical DVT prophylaxis orders are signed and held.            CODE STATUS:    Code Status and Medical Interventions:   Ordered at: 24 1429     Code Status (Patient has no pulse and is not breathing):    CPR (Attempt to Resuscitate)     Medical Interventions (Patient has pulse or is breathing):    Full Support       Expected Discharge   Expected discharge date/ time has not been documented.     Yanick Curtis MD  24      Electronically signed by Yanick Curtis MD at 24 1443          Physician Progress Notes (last 24 hours)        Satish Quintanilla MD at 24 1545              [facility] Medicine Services  PROGRESS NOTE    Patient Name: Patrice Howard  : 1945  MRN: [id number]    Date of Admission: 3/20/2024  Primary Care Physician: Delano Song DO    Subjective   Subjective     CC:  Follow-up for alcohol drawl    HPI:  Seen this AM.  Feels ok.  Reports still some tremors.  Son at bedside says that patient did better yesterday and did not have any hallucinations.  CIWA 0 this AM per nurse.     Objective   Objective     Vital Signs:   Temp:  [97.5 °F (36.4 °C)-98.5 °F (36.9 °C)] 97.7 °F (36.5 °C)  Heart Rate:  [59-75] 72  Resp:  [17-18] 18  BP: (122-170)/(80-97) 153/88     Physical Exam:  Constitutional: No acute distress, awake,  alert, sitting up in bed, son at bedside  HENT: NCAT, mucous membranes moist  Respiratory: Clear to auscultation bilaterally, respiratory effort normal   Cardiovascular: RRR, no murmurs, rubs, or gallops  Gastrointestinal: Positive bowel sounds, soft, nontender, nondistended  Musculoskeletal: No bilateral ankle edema  Psychiatric: Appropriate affect, cooperative  Neurologic: Oriented x 3, moves all extremities, Cranial Nerves grossly intact to confrontation, speech clear  Skin: No rashes      Results Reviewed:  LAB RESULTS:      Lab 03/25/24  0336 03/23/24  0819 03/22/24  0458 03/21/24  0700 03/20/24  1253   WBC 4.75 5.56 4.93 7.09 8.14   HEMOGLOBIN 12.5* 13.1 13.3 12.5* 14.1   HEMATOCRIT 35.2* 37.0* 36.4* 36.1* 40.1   PLATELETS 139* 125* 106* 118* 154   NEUTROS ABS 2.25 3.10 3.39 4.02 5.62   IMMATURE GRANS (ABS) 0.02 0.02 0.02 0.02 0.03   LYMPHS ABS 1.41 1.26 0.70 1.68 1.23   MONOS ABS 0.74 0.94* 0.77 1.34* 1.22*   EOS ABS 0.30 0.22 0.04 0.01 0.01   .8* 103.1* 100.8* 108.4* 104.2*   APTT  --   --   --   --  28.0         Lab 03/25/24  0336 03/23/24  0819 03/22/24  2037 03/22/24  0458 03/21/24  0700 03/20/24  1249   SODIUM 135* 135*  --  130* 132*  --    POTASSIUM 3.8 4.3 3.6 3.5 3.7  --    CHLORIDE 103 102  --  95* 98  --    CO2 24.0 23.0  --  24.0 22.0  --    ANION GAP 8.0 10.0  --  11.0 12.0  --    BUN 11 6*  --  10 11  --    CREATININE 0.68* 0.66*  --  0.69* 0.81 0.90   EGFR 95.1 96.0  --  94.7 90.2 87.4   GLUCOSE 120* 101*  --  153* 96  --    CALCIUM 9.1 8.9  --  8.5* 8.3*  --    MAGNESIUM 1.8 1.7  --  1.7  --   --    PHOSPHORUS  --  2.8  --  2.6  --   --    HEMOGLOBIN A1C  --   --   --   --  5.30  --    TSH  --   --   --   --  0.733  --          Lab 03/23/24  0819 03/22/24  0458 03/21/24  0700 03/20/24  1253   TOTAL PROTEIN 5.6* 6.0 5.9*  --    ALBUMIN 3.5 3.9 3.9  --    GLOBULIN 2.1 2.1 2.0  --    ALT (SGPT) 15 15 17 27   AST (SGOT) 21 19 19 27   BILIRUBIN 0.7 1.0 1.0  --    ALK PHOS 70 80 64  --           Lab 03/20/24  1253   HSTROP T 11         Lab 03/21/24  0700   CHOLESTEROL 131   LDL CHOL 50   HDL CHOL 63*   TRIGLYCERIDES 95         Lab 03/20/24  2225   FOLATE 9.98   VITAMIN B 12 279         Brief Urine Lab Results  (Last result in the past 365 days)        Color   Clarity   Blood   Leuk Est   Nitrite   Protein   CREAT   Urine HCG        03/20/24 1345 Yellow   Clear   Small (1+)   Negative   Negative   Negative                   Microbiology Results Abnormal       Procedure Component Value - Date/Time    COVID PRE-OP / PRE-PROCEDURE SCREENING ORDER (NO ISOLATION) - Swab, Nasopharynx [[id number]]  (Normal) Collected: 03/20/24 1346    Lab Status: Final result Specimen: Swab from Nasopharynx Updated: 03/20/24 1446    Narrative:      The following orders were created for panel order COVID PRE-OP / PRE-PROCEDURE SCREENING ORDER (NO ISOLATION) - Swab, Nasopharynx.  Procedure                               Abnormality         Status                     ---------                               -----------         ------                     Respiratory Panel PCR w/...[[id number]]  Normal              Final result                 Please view results for these tests on the individual orders.    Respiratory Panel PCR w/COVID-19(SARS-CoV-2) LEA/BRIGID/EMILY/PAD/COR/ZOFIA In-House, NP Swab in UTM/VTM, 2 HR TAT - Swab, Nasopharynx [[id number]]  (Normal) Collected: 03/20/24 1346    Lab Status: Final result Specimen: Swab from Nasopharynx Updated: 03/20/24 1446     ADENOVIRUS, PCR Not Detected     Coronavirus 229E Not Detected     Coronavirus HKU1 Not Detected     Coronavirus NL63 Not Detected     Coronavirus OC43 Not Detected     COVID19 Not Detected     Human Metapneumovirus Not Detected     Human Rhinovirus/Enterovirus Not Detected     Influenza A PCR Not Detected     Influenza B PCR Not Detected     Parainfluenza Virus 1 Not Detected     Parainfluenza Virus 2 Not Detected     Parainfluenza Virus 3 Not Detected     Parainfluenza Virus 4  Not Detected     RSV, PCR Not Detected     Bordetella pertussis pcr Not Detected     Bordetella parapertussis PCR Not Detected     Chlamydophila pneumoniae PCR Not Detected     Mycoplasma pneumo by PCR Not Detected    Narrative:      In the setting of a positive respiratory panel with a viral infection PLUS a negative procalcitonin without other underlying concern for bacterial infection, consider observing off antibiotics or discontinuation of antibiotics and continue supportive care. If the respiratory panel is positive for atypical bacterial infection (Bordetella pertussis, Chlamydophila pneumoniae, or Mycoplasma pneumoniae), consider antibiotic de-escalation to target atypical bacterial infection.            MRI Brain With & Without Contrast    Result Date: 3/24/2024  MRI BRAIN W WO CONTRAST Date of Exam: 3/23/2024 9:16 PM EDT Indication: Stroke, follow up.  Comparison: None available. Technique:  Routine multiplanar/multisequence sequence images of the brain were obtained before and after the uneventful administration of 10 mL Multihance. Findings: There is mild generalized parenchymal volume loss. There is no diffusion restriction to suggest acute infarct. There is no evidence of acute or chronic intracranial hemorrhage.  No mass effect or midline shift. No abnormal extra-axial collections. There are patchy areas of FLAIR hyperintense signal within the cerebral white matter. No new signal abnormalities within the brain parenchyma. The major vascular flow voids appear intact.  The basal ganglia, brainstem and cerebellum appear within normal limits.  Calvarial and superficial soft tissue signal is within normal limits.  Orbits appear unremarkable. There is mild ethmoid and maxillary sinus mucosal disease. The mastoid air cells appear well aerated. Midline structures are intact.  There is no abnormal intracranial enhancement. Postcontrast images are limited by motion. Prominent central canal in the upper cervical  cord partially visualized.     Impression: Impression: 1.No acute intracranial abnormality. 2.Mild chronic small vessel ischemic change and mild generalized parenchymal volume loss. 3.Mild paranasal sinus mucosal disease. Electronically Signed: Jeanmarie Hairston MD  3/24/2024 12:39 AM EDT  Workstation ID: [id number]     Results for orders placed during the hospital encounter of 03/20/24    Adult Transthoracic Echo Complete W/ Cont if Necessary Per Protocol (With Agitated Saline)    Interpretation Summary    Left ventricular systolic function is normal. Left ventricular ejection fraction appears to be 56 - 60%.    Left atrial volume is normal. No evidence of a patent foramen ovale. Saline test results are negative.    Trace mitral valve regurgitation is present.    Trace tricuspid valve regurgitation is present. Estimated right ventricular systolic pressure from tricuspid regurgitation is normal (<35 mmHg).    Mild dilation of the ascending aorta is present. Ascending aorta = 4.2 cm      Current medications:  Scheduled Meds:aspirin, 81 mg, Oral, Daily  FLUoxetine, 20 mg, Oral, Daily  folic acid 1 mg in sodium chloride 0.9 % 50 mL IVPB, 1 mg, Intravenous, Daily  insulin lispro, 2-7 Units, Subcutaneous, 4x Daily AC & at Bedtime  losartan, 50 mg, Oral, Daily  nicotine, 1 patch, Transdermal, Q24H  oxazepam, 15 mg, Oral, Q8H  pantoprazole, 40 mg, Intravenous, Q12H  QUEtiapine, 50 mg, Oral, Nightly  sodium chloride, 10 mL, Intravenous, Q12H  thiamine, 100 mg, Oral, Daily  vitamin B-12, 1,000 mcg, Oral, Daily      Continuous Infusions:lactated ringers, 100 mL/hr, Last Rate: 100 mL/hr (03/24/24 0904)      PRN Meds:.  acetaminophen    acetaminophen    senna-docusate sodium **AND** polyethylene glycol **AND** bisacodyl **AND** bisacodyl    Calcium Replacement - Follow Nurse / BPA Driven Protocol    dextrose    dextrose    glucagon (human recombinant)    Magnesium Standard Dose Replacement - Follow Nurse / BPA Driven Protocol     ondansetron ODT    Phosphorus Replacement - Follow Nurse / BPA Driven Protocol    Potassium Replacement - Follow Nurse / BPA Driven Protocol    prochlorperazine **OR** prochlorperazine **OR** prochlorperazine    sodium chloride    sodium chloride    ziprasidone    Assessment & Plan   Assessment & Plan     Active Hospital Problems    Diagnosis  POA    **Aphasia [R47.01]  Yes    Moderate malnutrition [E44.0]  Yes    Fever [R50.9]  Yes    Acute metabolic encephalopathy [G93.41]  Yes    Alcohol use [Z78.9]  Unknown    History of prostate cancer [Z85.46]  Not Applicable    HTN (hypertension) [I10]  Unknown      Resolved Hospital Problems   No resolved problems to display.        Brief Hospital Course to date:  Patrice Howard is a 78 y.o. male with history of type 2 diabetes, ongoing tobacco and alcohol use, history of prostate cancer status post resection, essential hypertension, who presented to the hospital with slurred speech and left-sided weakness    TIA  Acute stroke, ruled out  Presented with left-sided weakness and slurred speech concerning for acute stroke  Stroke imaging studies including CT head and MRI brain negative  Continue aspirin and statins  Neurology team following repeated MRI brain on 3/23/2024 because of left hemineglect but MRI was unremarkable  No plan for LP given his improved mental state which is likely secondary to alcohol withdrawal and nutritional deficiencies    Acute metabolic encephalopathy, improving  Alcohol withdrawal, improving  B12 deficiency  Drinks at least 3 double shots of bourbon every night  Lives alone and independent with ADLs but feeling depressed and admits to using alcohol to treat his depression  Continue CIWA protocol.  Discontinued IV benzo  Continue Serax 15mg 3 times daily   Seroquel 50 mg HS  S/P high-dose IV thiamine, currently on PO  Continue IM B12 supplement.  Will need prescription upon discharge  Extensive discussion with the patient and his son at bedside  regarding alcohol dependence and withdrawal    Possible alcohol induced gastritis  Nausea and vomiting for a week with poor oral intake.  Currently improved  Continue Protonix    Possible UTI, POA  Has urinary symptoms with dysuria and urgency  Status post p.o. cefdinir    Hypoosmolar hyponatremia  Improved with IV fluids.  Encourage p.o. intake    Vitamin B12 deficiency  B12 level is low at 270  Continue IM replacement.    Essential pretension  Restarted losartan    Type 2 diabetes  A1c 5.3%  SSI    Hx of prostate cancer  Status post resection  on Lupron     Depression  Started Prozac    Severe debility  PT and OT consultation  Inpatient rehab upon discharge.  Case management on board    Expected Discharge Location and Transportation: Inpatient rehab  Expected Discharge   Expected Discharge Date: 3/26/2024; Expected Discharge Time:      DVT prophylaxis:  Mechanical DVT prophylaxis orders are present.         AM-PAC 6 Clicks Score (PT): 8 (24 0800)    CODE STATUS:   Code Status and Medical Interventions:   Ordered at: 24 1429     Code Status (Patient has no pulse and is not breathing):    CPR (Attempt to Resuscitate)     Medical Interventions (Patient has pulse or is breathing):    Full Support     Copied text in this note has been reviewed and is accurate as of 24.     Satish Quintanilla MD  24        Electronically signed by Satish Quintanilla MD at 24 7802          Physical Therapy Notes (most recent note)        Jeanne Unger, PT at 24 0931  Version 1 of 1         Patient Name: Patrice Howard  : 1945    MRN: [id number]                              Today's Date: 3/26/2024       Admit Date: 3/20/2024    Visit Dx:     ICD-10-CM ICD-9-CM   1. Altered mental status, unspecified altered mental status type  R41.82 780.97   2. Difficulty with speech  R47.9 784.59   3. Dysphagia, unspecified type  R13.10 787.20     Patient Active Problem List   Diagnosis    Aphasia    Alcohol  use    History of prostate cancer    HTN (hypertension)    Fever    Acute metabolic encephalopathy    Moderate malnutrition     Past Medical History:   Diagnosis Date    Diabetes     Elevated PSA     Prostate cancer      History reviewed. No pertinent surgical history.   General Information       Row Name 03/26/24 0959          Physical Therapy Time and Intention    Document Type therapy note (daily note)  -KR     Mode of Treatment physical therapy;individual therapy  -KR       Row Name 03/26/24 0959          General Information    Patient Profile Reviewed yes  -KR     Existing Precautions/Restrictions fall  -KR     Barriers to Rehab medically complex  -KR       Row Name 03/26/24 0959          Safety Issues, Functional Mobility    Safety Issues Affecting Function (Mobility) safety precaution awareness;insight into deficits/self-awareness;awareness of need for assistance  -KR     Impairments Affecting Function (Mobility) balance;endurance/activity tolerance;strength;visual/perceptual  -KR               User Key  (r) = Recorded By, (t) = Taken By, (c) = Cosigned By      Initials Name Provider Type    Jeanne Purvis PT Physical Therapist                   Mobility       Row Name 03/26/24 0959          Sit-Stand Transfer    Sit-Stand Swisher (Transfers) contact guard  -KR     Comment, (Sit-Stand Transfer) 3x from chair  -KR       Row Name 03/26/24 0959          Gait/Stairs (Locomotion)    Swisher Level (Gait) contact guard  -KR     Distance in Feet (Gait) 50  50 + 50 + 20 + 20  -KR     Deviations/Abnormal Patterns (Gait) stride length decreased;weight shifting decreased;gait speed decreased  -KR     Bilateral Gait Deviations forward flexed posture;heel strike decreased  -KR     Comment, (Gait/Stairs) slow, intentional pace, no LOB.  -KR               User Key  (r) = Recorded By, (t) = Taken By, (c) = Cosigned By      Initials Name Provider Type    Jeanne Purvis PT Physical Therapist               "     Obj/Interventions       Row Name 03/26/24 1001          Motor Skills    Additional Documentation Advanced Stepping/Walking Interventions (Group)  -KR       Row Name 03/26/24 1001          Balance    Balance Assessment sitting static balance;sitting dynamic balance;standing static balance;standing dynamic balance  -KR     Static Sitting Balance independent  -KR     Dynamic Sitting Balance standby assist  -KR     Position, Sitting Balance unsupported;sitting in chair  -KR     Static Standing Balance contact guard  -KR     Dynamic Standing Balance contact guard  -KR     Position/Device Used, Standing Balance unsupported  -KR     Balance Interventions sitting;standing;sit to stand;static;dynamic;other (see comments)  30\" WBOS E.C. CGA, 30\" NBOS E.C. CGA, 30\" semi-tandem LLE forward E.C. [place], 30\" semi-tandem RLE forward E.C. Hermelinda  -KR       Row Name 03/26/24 1001          Advanced Stepping/Walking Interventions    Stepping/Walking Interventions backward walking;braiding;side stepping;tandem walking  -KR     Backward Walking (Stepping/Walking Interventions) 10' unilateral UE support, 10' unsupported CGA.  -KR     Braiding (Stepping/Walking Interventions) 10' B with BUE support and max cues for sequencing CGA  -KR     Tandem Walking (Stepping/Walking Interventions) 20' with unilateral UE support CGA  -KR     Side Stepping (Stepping/Walking Interventions) 10' B with BUE support CGA  -KR               User Key  (r) = Recorded By, (t) = Taken By, (c) = Cosigned By      Initials Name Provider Type    Jeanne Purvis, PT Physical Therapist                   Goals/Plan    No documentation.                  Clinical Impression       Row Name 03/26/24 1004          Pain    Pretreatment Pain Rating 0/10 - no pain  -KR     Posttreatment Pain Rating 0/10 - no pain  -KR       Row Name 03/26/24 1004          Plan of Care Review    Plan of Care Reviewed With patient;son  -KR     Progress improving  -KR     Outcome Evaluation Pt " with significant improvement in balance, transfers, and ambulation this date. Pt required less assist for all mobility tasks. Pt continue to demo deficits in static and dynamic standing balance and will continue to benefit from PT to address these ongoing deficits.  -KR       Row Name 03/26/24 1004          Vital Signs    Pre Patient Position Sitting  -KR     Intra Patient Position Standing  -KR     Post Patient Position Sitting  -KR       Row Name 03/26/24 1004          Positioning and Restraints    Pre-Treatment Position sitting in chair/recliner  -KR     Post Treatment Position chair  -KR     In Chair notified nsg;reclined;call light within reach;encouraged to call for assist;exit alarm on;waffle cushion;legs elevated;with family/caregiver  -KR               User Key  (r) = Recorded By, (t) = Taken By, (c) = Cosigned By      Initials Name Provider Type    Jeanne Purvis PT Physical Therapist                   Outcome Measures       Row Name 03/26/24 1005 03/26/24 0901       How much help from another person do you currently need...    Turning from your back to your side while in flat bed without using bedrails? 3  -KR 3  -AS    Moving from lying on back to sitting on the side of a flat bed without bedrails? 3  -KR 3  -AS    Moving to and from a bed to a chair (including a wheelchair)? 3  -KR 3  -AS    Standing up from a chair using your arms (e.g., wheelchair, bedside chair)? 3  -KR 4  -AS    Climbing 3-5 steps with a railing? 3  -KR 2  -AS    To walk in hospital room? 3  -KR 3  -AS    AM-PAC 6 Clicks Score (PT) 18  -KR 18  -AS    Highest Level of Mobility Goal 6 --> Walk 10 steps or more  -KR 6 --> Walk 10 steps or more  -AS              User Key  (r) = Recorded By, (t) = Taken By, (c) = Cosigned By      Initials Name Provider Type    Jeanne Purvis PT Physical Therapist    AS Julissa Velasquez RN Registered Nurse                                 Physical Therapy Education       Title: PT OT SLP  Therapies (In Progress)       Topic: Physical Therapy (Done)       Point: Mobility training (Done)       Learning Progress Summary             Patient Acceptance, E, VU by KR at 3/26/2024 1006    Acceptance, E, VU,NR by CD at 3/22/2024 1621    Comment: SEE FLOWSHEET    Acceptance, E, VU,NR by CD at 3/21/2024 1544    Comment: BENEFITS OF OOB ACTIVITY, SAFETY WITH MOBILITY, PROGRESSION OF POC, D/C PLANNING,   Family Acceptance, E, VU,NR by CD at 3/21/2024 1544    Comment: BENEFITS OF OOB ACTIVITY, SAFETY WITH MOBILITY, PROGRESSION OF POC, D/C PLANNING,                         Point: Home exercise program (Done)       Learning Progress Summary             Patient Acceptance, E, VU by KR at 3/26/2024 1006    Acceptance, E, VU,NR by CD at 3/22/2024 1621    Comment: SEE FLOWSHEET    Acceptance, E, VU,NR by CD at 3/21/2024 1544    Comment: BENEFITS OF OOB ACTIVITY, SAFETY WITH MOBILITY, PROGRESSION OF POC, D/C PLANNING,   Family Acceptance, E, VU,NR by CD at 3/21/2024 1544    Comment: BENEFITS OF OOB ACTIVITY, SAFETY WITH MOBILITY, PROGRESSION OF POC, D/C PLANNING,                         Point: Body mechanics (Done)       Learning Progress Summary             Patient Acceptance, E, VU by KR at 3/26/2024 1006    Acceptance, E, VU,NR by CD at 3/22/2024 1621    Comment: SEE FLOWSHEET    Acceptance, E, VU,NR by CD at 3/21/2024 1544    Comment: BENEFITS OF OOB ACTIVITY, SAFETY WITH MOBILITY, PROGRESSION OF POC, D/C PLANNING,   Family Acceptance, E, VU,NR by CD at 3/21/2024 1544    Comment: BENEFITS OF OOB ACTIVITY, SAFETY WITH MOBILITY, PROGRESSION OF POC, D/C PLANNING,                         Point: Precautions (Done)       Learning Progress Summary             Patient Acceptance, E, VU by KR at 3/26/2024 1006    Acceptance, E, VU,NR by CD at 3/22/2024 1621    Comment: SEE FLOWSHEET    Acceptance, E, VU,NR by CD at 3/21/2024 1544    Comment: BENEFITS OF OOB ACTIVITY, SAFETY WITH MOBILITY, PROGRESSION OF POC, D/C PLANNING,    Family Acceptance, E, VU,NR by CD at 3/21/2024 1544    Comment: BENEFITS OF OOB ACTIVITY, SAFETY WITH MOBILITY, PROGRESSION OF POC, D/C PLANNING,                                         User Key       Initials Effective Dates Name Provider Type Discipline    CD 02/03/23 -  Antonieta Sousa PT Physical Therapist PT    KR 12/30/22 -  Jeanne Unger PT Physical Therapist PT                  PT Recommendation and Plan     Plan of Care Reviewed With: patient, son  Progress: improving  Outcome Evaluation: Pt with significant improvement in balance, transfers, and ambulation this date. Pt required less assist for all mobility tasks. Pt continue to demo deficits in static and dynamic standing balance and will continue to benefit from PT to address these ongoing deficits.     Time Calculation:         PT Charges       Row Name 03/26/24 1007             Time Calculation    Start Time 0931  -KR      PT Received On 03/26/24  -KR         Timed Charges    21110 -  PT Neuromuscular Reeducation Minutes 13  -KR      34738 - PT Therapeutic Activity Minutes 10  -KR         Total Minutes    Timed Charges Total Minutes 23  -KR       Total Minutes 23  -KR                User Key  (r) = Recorded By, (t) = Taken By, (c) = Cosigned By      Initials Name Provider Type    Jeanne Purvis, AMARILIS Physical Therapist                  Therapy Charges for Today       Code Description Service Date Service Provider Modifiers Qty    25798132491 HC PT NEUROMUSC RE EDUCATION EA 15 MIN 3/26/2024 Jeanne Unger, PT GP 1    11202885922 HC PT THERAPEUTIC ACT EA 15 MIN 3/26/2024 Jeanne Unger, PT GP 1            PT G-Codes  Outcome Measure Options: Modified [place], AM-PAC 6 Clicks Basic Mobility (PT)  AM-PAC 6 Clicks Score (PT): 18  AM-PAC 6 Clicks Score (OT): 15  Modified [place] Scale: 4 - Moderately severe disability.  Unable to walk without assistance, and unable to attend to own bodily needs without assistance.       Jeanne Unger  PT  3/26/2024      Electronically signed by Jeanne Unger PT at 24 1008          Occupational Therapy Notes (most recent note)        Jadyn Yates OT at 24 0913          Patient Name: Patrice Howard  : 1945    MRN: [id number]                              Today's Date: 3/26/2024       Admit Date: 3/20/2024    Visit Dx:     ICD-10-CM ICD-9-CM   1. Altered mental status, unspecified altered mental status type  R41.82 780.97   2. Difficulty with speech  R47.9 784.59   3. Dysphagia, unspecified type  R13.10 787.20     Patient Active Problem List   Diagnosis    Aphasia    Alcohol use    History of prostate cancer    HTN (hypertension)    Fever    Acute metabolic encephalopathy    Moderate malnutrition     Past Medical History:   Diagnosis Date    Diabetes     Elevated PSA     Prostate cancer      History reviewed. No pertinent surgical history.   General Information       Row Name 24 1009          OT Time and Intention    Document Type therapy note (daily note)  -     Mode of Treatment occupational therapy  -       Row Name 24 1009          General Information    Patient Profile Reviewed yes  -     Existing Precautions/Restrictions fall  -     Barriers to Rehab medically complex  -       Row Name 24 1009          Safety Issues, Functional Mobility    Safety Issues Affecting Function (Mobility) awareness of need for assistance;insight into deficits/self-awareness;safety precaution awareness  -     Impairments Affecting Function (Mobility) balance;endurance/activity tolerance;strength;visual/perceptual  -               User Key  (r) = Recorded By, (t) = Taken By, (c) = Cosigned By      Initials Name Provider Type     Jadyn Yates OT Occupational Therapist                     Mobility/ADL's       Row Name 24 1009          Bed Mobility    Supine-Sit [place] (Bed Mobility) verbal cues;supervision  -       Row Name 24 1009          Transfers     Transfers sit-stand transfer;stand-sit transfer  -KL       Row Name 03/26/24 1009          Sit-Stand Transfer    Sit-Stand [place] (Transfers) contact guard;verbal cues  -KL     Assistive Device (Sit-Stand Transfers) other (see comments)  HHA  -KL       Row Name 03/26/24 1009          Stand-Sit Transfer    Stand-Sit [place] (Transfers) verbal cues;contact guard  -KL     Assistive Device (Stand-Sit Transfers) other (see comments)  HHA  -KL       Row Name 03/26/24 1009          Activities of Daily Living    BADL Assessment/Intervention grooming;lower body dressing  -KL       Row Name 03/26/24 1009          Lower Body Dressing Assessment/Training    [place] Level (Lower Body Dressing) don;socks;supervision  -KL     Position (Lower Body Dressing) edge of bed sitting  -KL       Row Name 03/26/24 1009          Grooming Assessment/Training    [place] Level (Grooming) oral care regimen;contact guard assist  -KL     Position (Grooming) sink side  -KL               User Key  (r) = Recorded By, (t) = Taken By, (c) = Cosigned By      Initials Name Provider Type    Jadyn Jeong OT Occupational Therapist                   Obj/Interventions       Row Name 03/26/24 1011          Balance    Static Sitting Balance independent  -KL     Dynamic Sitting Balance standby assist  -KL     Position, Sitting Balance unsupported;sitting edge of bed  -KL     Static Standing Balance contact guard  -KL     Dynamic Standing Balance contact guard  -KL     Position/Device Used, Standing Balance unsupported  -KL     Balance Interventions sitting;standing;sit to stand;supported;static;dynamic;occupation based/functional task  -               User Key  (r) = Recorded By, (t) = Taken By, (c) = Cosigned By      Initials Name Provider Type    Jadyn Jeong OT Occupational Therapist                   Goals/Plan    No documentation.                  Clinical Impression       Row Name 03/26/24 1011          Pain Assessment     Pretreatment Pain Rating 0/10 - no pain  -     Posttreatment Pain Rating 0/10 - no pain  -       Row Name 03/26/24 1011          Plan of Care Review    Plan of Care Reviewed With patient;son  -     Progress improving  -     Outcome Evaluation Pt demo significant improvement in mobility, self-care and t/fs during therapy session. Pt continues to demo decreased activity tolerance and CGA d/t static and dynamic balance. Will continue w/ current POC. d/c rec continues to be IPR.  -       Row Name 03/26/24 1011          Therapy Plan Review/Discharge Plan (OT)    Anticipated Discharge Disposition (OT) inpatient rehabilitation facility  -       Row Name 03/26/24 1011          Vital Signs    Pre Systolic BP Rehab 155  -KL     Pre Treatment Diastolic BP 96  -KL     Pretreatment Heart Rate (beats/min) 65  -KL     Pre SpO2 (%) 98  -KL     O2 Delivery Pre Treatment room air  -     Pre Patient Position Supine  -     Intra Patient Position Standing  -     Post Patient Position Sitting  -       Row Name 03/26/24 1011          Positioning and Restraints    Pre-Treatment Position in bed  -KL     Post Treatment Position chair  -     In Chair notified nsg;reclined;sitting;call light within reach;encouraged to call for assist;exit alarm on;with family/caregiver;waffle cushion;legs elevated  -               User Key  (r) = Recorded By, (t) = Taken By, (c) = Cosigned By      Initials Name Provider Type    Jadyn Jeong, CHEIKH Occupational Therapist                   Outcome Measures       Row Name 03/26/24 1015          How much help from another is currently needed...    Putting on and taking off regular lower body clothing? 3  -KL     Bathing (including washing, rinsing, and drying) 3  -KL     Toileting (which includes using toilet bed pan or urinal) 3  -KL     Putting on and taking off regular upper body clothing 4  -KL     Taking care of personal grooming (such as brushing teeth) 4  -KL     Eating meals 4  -KL      AM-PAC 6 Clicks Score (OT) 21  -KL       Row Name 03/26/24 1005 03/26/24 0901       How much help from another person do you currently need...    Turning from your back to your side while in flat bed without using bedrails? 3  -KR 3  -AS    Moving from lying on back to sitting on the side of a flat bed without bedrails? 3  -KR 3  -AS    Moving to and from a bed to a chair (including a wheelchair)? 3  -KR 3  -AS    Standing up from a chair using your arms (e.g., wheelchair, bedside chair)? 3  -KR 4  -AS    Climbing 3-5 steps with a railing? 3  -KR 2  -AS    To walk in hospital room? 3  -KR 3  -AS    AM-PAC 6 Clicks Score (PT) 18  -KR 18  -AS    Highest Level of Mobility Goal 6 --> Walk 10 steps or more  -KR 6 --> Walk 10 steps or more  -AS      Row Name 03/26/24 1015          Functional Assessment    Outcome Measure Options AM-[facility] 6 Clicks Daily Activity (OT)  -ANH               User Key  (r) = Recorded By, (t) = Taken By, (c) = Cosigned By      Initials Name Provider Type    Jeanne Purvis, PT Physical Therapist    AS Julissa Velasquez, RN Registered Nurse    Jadyn Jeong OT Occupational Therapist                    Occupational Therapy Education       Title: PT OT SLP Therapies (In Progress)       Topic: Occupational Therapy (In Progress)       Point: ADL training (Done)       Description:   Instruct learner(s) on proper safety adaptation and remediation techniques during self care or transfers.   Instruct in proper use of assistive devices.                  Learning Progress Summary             Patient Acceptance, E, VU,NR by ANH at 3/26/2024 1015    Acceptance, E,D, VU,DU by REBECCA at 3/21/2024 1044                         Point: Home exercise program (Not Started)       Description:   Instruct learner(s) on appropriate technique for monitoring, assisting and/or progressing therapeutic exercises/activities.                  Learner Progress:  Not documented in this visit.              Point: Precautions (Done)        Description:   Instruct learner(s) on prescribed precautions during self-care and functional transfers.                  Learning Progress Summary             Patient Acceptance, E, VU,NR by  at 3/26/2024 1015    Acceptance, E,D, VU,DU by  at 3/21/2024 1044                         Point: Body mechanics (Done)       Description:   Instruct learner(s) on proper positioning and spine alignment during self-care, functional mobility activities and/or exercises.                  Learning Progress Summary             Patient Acceptance, E, VU,NR by  at 3/26/2024 1015    Acceptance, E,D, VU,DU by  at 3/21/2024 1044                                         User Key       Initials Effective Dates Name Provider Type Discipline     06/16/21 -  Jairo Del Valle OT Occupational Therapist OT     02/05/24 -  Jadyn Yates OT Occupational Therapist OT                  OT Recommendation and Plan     Plan of Care Review  Plan of Care Reviewed With: patient, son  Progress: improving  Outcome Evaluation: Pt demo significant improvement in mobility, self-care and t/fs during therapy session. Pt continues to demo decreased activity tolerance and CGA d/t static and dynamic balance. Will continue w/ current POC. d/c rec continues to be IPR.     Time Calculation:         Time Calculation- OT       Row Name 03/26/24 1016             Time Calculation- OT    OT Start Time 0913  -KL      OT Received On 03/26/24  -         Timed Charges    80565 - OT Self Care/Mgmt Minutes 18  -KL         Total Minutes    Timed Charges Total Minutes 18  -KL       Total Minutes 18  -KL                User Key  (r) = Recorded By, (t) = Taken By, (c) = Cosigned By      Initials Name Provider Type     Jadyn Yates OT Occupational Therapist                  Therapy Charges for Today       Code Description Service Date Service Provider Modifiers Qty    26369342150  OT SELF CARE/MGMT/TRAIN EA 15 MIN 3/26/2024 Jadyn Yates OT GO 1                  Jadyn Yates OT  3/26/2024    Electronically signed by Jadyn Yates OT at 24 1016          Speech Language Pathology Notes (most recent note)        Danitza Garcia MS CCC-SLP at 24 1728          Acute Care - Speech Language Pathology   Swallow Re-Evaluation [facility]  Clinical Swallow Evaluation       Patient Name: Patrice Howard  : 1945  MRN: [id number]  Today's Date: 3/23/2024               Admit Date: 3/20/2024    Visit Dx:     ICD-10-CM ICD-9-CM   1. Altered mental status, unspecified altered mental status type  R41.82 780.97   2. Difficulty with speech  R47.9 784.59   3. Dysphagia, unspecified type  R13.10 787.20     Patient Active Problem List   Diagnosis    Aphasia    Alcohol use    History of prostate cancer    HTN (hypertension)    Fever    Acute metabolic encephalopathy    Moderate malnutrition     Past Medical History:   Diagnosis Date    Diabetes     Elevated PSA     Prostate cancer      History reviewed. No pertinent surgical history.    SLP Recommendation and Plan  SLP Swallowing Diagnosis: swallow WFL/no suspected pharyngeal impairment (24 144)  SLP Diet Recommendation: regular textures, thin liquids (24 144)  Recommended Precautions and Strategies: upright posture during/after eating, small bites of food and sips of liquid, general aspiration precautions (24 144)  SLP Rec. for Method of Medication Administration: meds whole, as tolerated (24 144)     Monitor for Signs of Aspiration: yes, notify SLP if any concerns (24 144)     Swallow Criteria for Skilled Therapeutic Interventions Met: no problems identified which require skilled intervention (24 144)  Anticipated Discharge Disposition (SLP): unknown, anticipate therapy at next level of care (24 1423)     Therapy Frequency (Swallow): evaluation only (24 144)  Predicted Duration Therapy Intervention (Days): until discharge (24 1423)  Oral Care Recommendations: Oral  Care BID/PRN, Toothbrush (03/23/24 1445)                                        Plan of Care Reviewed With: patient, family      SWALLOW EVALUATION (Last 72 Hours)       SLP Adult Swallow Evaluation       Row Name 03/23/24 1445 03/22/24 1410 03/21/24 0910             Rehab Evaluation    Document Type evaluation  - re-evaluation  - evaluation  -      Subjective Information no complaints  -KH no complaints  - no complaints  -      Patient Observations alert;cooperative;agree to therapy  -KH lethargic  - lethargic  -      Patient/Family/Caregiver Comments/Observations cousin present  - Family present  -MH Son present  -MH      Patient Effort good  -KH fair  -MH fair  -      Symptoms Noted During/After Treatment none  -KH none  -MH none  -         General Information    Patient Profile Reviewed yes  -KH yes  - yes  -      Pertinent History Of Current Problem See prev eval  -KH See initial eval  -MH Adm w/ AMS. Hx: diabetes, tobacco use, daily alcohol use, prostate CA s/p resection w/ partial XRT, HTN. MRI negative for acute intracranial abnormalities  -      Current Method of Nutrition pureed;thin liquids  - regular textures;thin liquids;other (see comments)  CSE completed 3/21 w/ recs for NPO, MD entered diet later in PM  -MH NPO  -MH      Precautions/Limitations, Vision WFL with corrective lenses;for purposes of eval  -KH difficult to assess;other (see comments)  Pt w/ eyes closed majority of eval  -MH difficult to assess;other (see comments)  pt w/ eyes closed majority of eval  -MH      Precautions/Limitations, Hearing WFL;for purposes of eval  -KH difficult to assess  -MH difficult to assess  -MH      Prior Level of Function-Communication WFL  -KH unknown  - unknown  -      Prior Level of Function-Swallowing no diet consistency restrictions  -KH no diet consistency restrictions;other (see comments)  per family report  - no diet consistency restrictions;other (see comments)  per  family  -      Plans/Goals Discussed with patient and family;agreed upon  - patient;family;agreed upon  - patient;family;agreed upon  -      Barriers to Rehab none identified  - none identified  - none identified  -      Patient's Goals for Discharge -- patient did not state  - patient did not state  -      Family Goals for Discharge -- family did not state  - family did not state  -         Pain    Additional Documentation Pain Scale: FACES Pre/Post-Treatment (Group)  - Pain Scale: FACES Pre/Post-Treatment (Group)  - Pain Scale: FACES Pre/Post-Treatment (Group)  -         Pain Scale: FACES Pre/Post-Treatment    Pain: FACES Scale, Pretreatment 0-->no hurt  -KH 0-->no hurt  - 0-->no hurt  -      Posttreatment Pain Rating 0-->no hurt  -KH 0-->no hurt  - 0-->no hurt  -         Oral Motor Structure and Function    Dentition Assessment natural, present and adequate  - natural, present and adequate  - natural, present and adequate  -      Secretion Management WNL/WFL  - WNL/WFL  - WNL/WFL  -      Mucosal Quality moist, healthy  - moist, healthy  - moist, healthy  -         Oral Musculature and Cranial Nerve Assessment    Oral Motor General Assessment generalized oral motor weakness  - generalized oral motor weakness  - generalized oral motor weakness  -         General Eating/Swallowing Observations    Respiratory Support Currently in Use room air  - room air  - room air  -      Eating/Swallowing Skills self-fed  - self-fed;fed by SLP;needed assist  - fed by SLP;self-fed  -      Positioning During Eating upright in bed  - upright in bed  - upright in bed  -      Utensils Used spoon;cup;straw  - spoon;cup;straw  - spoon;cup;straw  -      Consistencies Trialed regular textures;pureed;thin liquids;ice chips  - ice chips;thin liquids;pureed  - ice chips;thin liquids;pureed  -         Clinical Swallow Eval    Oral Prep Phase WFL  - --  --      Oral Transit WFL  - -- --      Oral Residue [facility]  - -- --      Pharyngeal Phase no overt signs/symptoms of pharyngeal impairment  - suspected pharyngeal impairment  - --  R/o pharyngeal dysphagia  -      Clinical Swallow Evaluation Summary Pt with significant improvement in mental status today compared to note from yesterday. Pt with no overt clinical s/sx of aspiration with any trial. Pt with seemingly adequate oral prep of solid. Recommend regular diet and thin liquids, standard aspiration precautions, and oral care. SLP will sign off for dysphagia and continue to f/u for cognitive tx.  - Cont w/ lethargy, cues required t/o for pt to accept PO trials. Pt accepted minimal PO trials. Intermittent cough w/ thins via straw only. No s/s w/ thins via tsp/cup or pureed trials. Regular solid trials deferred 2' lethargy/cog status. Pt not appropriate for instrumental this date given poor acceptance of PO. Recommend pureed diet w/ thin liquids, no straw, 1:1 assistance, feed only when fully awake/alert. SLP will f/u for re-eval 3/23  - Pt lethargic, cues required t/o for pt to remain awake/alert. Pt became agitated quickly, attempting to get out of bed; RN present/aware. Delayed cough x1 w/ initial thin liquid trial via tsp, u/a to replicate accross multiple additional trials. No s/s w/ thins via cup/straw; even when consecutive sips. No s/s w/ pureed. Given inability to remain awake/alert, u/a to safely recommend PO diet @ this time. Recommend cont NPO, essential meds ok crushed/whole in applesauce/pudding as tolerated. SLP will f/u for re-eval  -         Pharyngeal Phase Concerns    Pharyngeal Phase Concerns -- cough  - --      Cough -- thin;other (see comments)  via straw only  - --         SLP Evaluation Clinical Impression    SLP Swallowing Diagnosis swallow WFL/no suspected pharyngeal impairment  - suspected pharyngeal dysphagia  - R/O pharyngeal dysphagia  -      Functional Impact no  impact on function  - risk of aspiration/pneumonia  - risk of aspiration/pneumonia  -      Rehab Potential/Prognosis, Swallowing -- good, to achieve stated therapy goals  - good, to achieve stated therapy goals  -      Swallow Criteria for Skilled Therapeutic Interventions Met no problems identified which require skilled intervention  - demonstrates skilled criteria  - demonstrates skilled criteria  -         Recommendations    Therapy Frequency (Swallow) evaluation only  - -- --      Predicted Duration Therapy Intervention (Days) -- until discharge  - until discharge  -      SLP Diet Recommendation regular textures;thin liquids  - puree;thin liquids;other (see comments)  no straw  - NPO  -      Recommended Diagnostics -- reassess via clinical swallow evaluation  - reassess via clinical swallow evaluation  -      Recommended Precautions and Strategies upright posture during/after eating;small bites of food and sips of liquid;general aspiration precautions  - general aspiration precautions;1:1 supervision;assist with feeding;small bites of food and sips of liquid;other (see comments)  Feed only when fully awake/alert  - general aspiration precautions  -      Oral Care Recommendations Oral Care BID/PRN;Toothbrush  - Oral Care BID/PRN;Suction toothbrush  - Oral Care BID/PRN;Suction toothbrush  -      SLP Rec. for Method of Medication Administration meds whole;as tolerated  - meds whole;meds crushed;with puree;as tolerated  - meds whole;meds crushed;with puree;as tolerated  -      Monitor for Signs of Aspiration yes;notify SLP if any concerns  - yes;notify SLP if any concerns  - yes;notify SLP if any concerns  -      Anticipated Discharge Disposition (SLP) -- unknown;anticipate therapy at next level of care  - unknown;anticipate therapy at next level of care  -      Demonstrates Need for Referral to Another Service -- speech therapy;other (see comments)  SLC  eval pending pt status  - --                User Key  (r) = Recorded By, (t) = Taken By, (c) = Cosigned By      Initials Name Effective Dates    PRAVIN Danitza Garcia, MS CCC-SLP 01/15/24 -      Olga Casey, MS CCC-SLP 05/12/23 -                     EDUCATION  The patient has been educated in the following areas:   Cognitive Impairment Communication Impairment.        SLP GOALS       Row Name 03/23/24 9822             Patient will demonstrate functional cognitive-linguistic skills for return to discharge environment    [place] Independently  -      Time frame by discharge  [facility]         Orientation Goal 1 (SLP)    Improve Orientation Through Goal 1 (SLP) demonstrating orientation to day;demonstrating orientation to month;demonstrating orientation to year;demonstrating orientation to place;demonstrating orientation to disease/impairment;use environmental aids to assist with orientation;90%;independently (over 90% accuracy)  -      Time Frame (Orientation Goal 1, SLP) short term goal (STG);by discharge  -         Memory Skills Goal 1 (SLP)    Improve Memory Skills Through Goal 1 (SLP) recalling related word lists with an imposed delay;recalling unrelated word lists with an imposed delay;use memory strategies;80%;with minimal cues (75-90%)  -      Time Frame (Memory Skills Goal 1, SLP) short term goal (STG);by discharge  -         Organizational Skills Goal 1 (SLP)    Improve Thought Organization Through Goal 1 (SLP) completing a divergent naming task;completing a convergent naming task;80%;with minimal cues (75-90%)  -      Time Frame (Thought Organization Skills Goal 1, SLP) short term goal (STG);by discharge  -         Reasoning Goal 1 (SLP)    Improve Reasoning Through Goal 1 (SLP) complete basic reasoning task;complete high level reasoning task;80%;with minimal cues (75-90%)  -      Time Frame (Reasoning Goal 1, SLP) short term goal (STG);by discharge  [facility]         Functional Problem Solving  Skills Goal 1 (SLP)    Improve Problem Solving Through Goal 1 (SLP) determine solutions to simple ADL/safety problems;determine solutions to complex problems;80%;with minimal cues (75-90%)  -      Time Frame (Problem Solving Goal 1, SLP) short term goal (STG);by discharge  -         Executive Functional Skills Goal 1 (SLP)    Improve Executive Function Skills Goal 1 (SLP) demonstrate awareness of deficit;identify strategies, strengths, limitations;identify anticipated needs;80%;with minimal cues (75-90%)  -      Time Frame (Executive Function Skills Goal 1, SLP) short term goal (STG);by discharge  -                User Key  (r) = Recorded By, (t) = Taken By, (c) = Cosigned By      Initials Name Provider Type    Danitza Kim MS CCC-SLP Speech and Language Pathologist                       Time Calculation:    Time Calculation- SLP       Row Name 03/23/24 1727             Time Calculation- SLP    SLP Start Time 1423  -KH      SLP Received On 03/23/24  -KH         Untimed Charges    SLP Eval/Re-eval  ST Eval Oral Pharyng Swallow - 95864;ST Eval Speech and Production w/ Language - 63518  -KH      12608-DT Eval Speech and Production w/ Language Minutes 60  -KH      97104-AN Eval Oral Pharyng Swallow Minutes 40  -KH         Total Minutes    Untimed Charges Total Minutes 100  -KH       Total Minutes 100  -KH                User Key  (r) = Recorded By, (t) = Taken By, (c) = Cosigned By      Initials Name Provider Type    Danitza Kim MS CCC-SLP Speech and Language Pathologist                    Therapy Charges for Today       Code Description Service Date Service Provider Modifiers Qty    88401733996 HC ST EVAL ORAL PHARYNG SWALLOW 3 3/23/2024 Danitza Garcia MS CCC-SLP GN 1    04164685641 HC ST EVAL SPEECH AND PROD W LANG  4 3/23/2024 Danitza Garcia MS CCC-CARIDAD GN 1                 MS BAIRON Flores  3/23/2024   and Acute Care - Speech Language Pathology Initial Evaluation    J[facility]  Cognitive-Communication Evaluation       Patient Name: Patrice Howard  : 1945  MRN: [id number]  Today's Date: 3/23/2024               Admit Date: 3/20/2024     Visit Dx:    ICD-10-CM ICD-9-CM   1. Altered mental status, unspecified altered mental status type  R41.82 780.97   2. Difficulty with speech  R47.9 784.59   3. Dysphagia, unspecified type  R13.10 787.20     Patient Active Problem List   Diagnosis    Aphasia    Alcohol use    History of prostate cancer    HTN (hypertension)    Fever    Acute metabolic encephalopathy    Moderate malnutrition     Past Medical History:   Diagnosis Date    Diabetes     Elevated PSA     Prostate cancer      History reviewed. No pertinent surgical history.    SLP Recommendation and Plan  SLP Diagnosis: mild, cognitive-linguistic disorder (24 1423)           Swallow Criteria for Skilled Therapeutic Interventions Met: no problems identified which require skilled intervention (24 1445)  SLC Criteria for Skilled Therapy Interventions Met: yes (24 1423)  Anticipated Discharge Disposition (SLP): unknown, anticipate therapy at next level of care (24 1423)     Therapy Frequency (Swallow): evaluation only (24 1445)  Therapy Frequency (SLP SLC): 3 days per week (24 1423)  Predicted Duration Therapy Intervention (Days): until discharge (24 1423)  Oral Care Recommendations: Oral Care BID/PRN, Toothbrush (24 1445)                          Plan of Care Reviewed With: patient, family (24 1726)      SLP EVALUATION (Last 72 Hours)       SLP SLC Evaluation       Row Name 24                   Communication Assessment/Intervention    Document Type evaluation  -KH        Subjective Information no complaints  -KH        Patient Observations alert;cooperative;agree to therapy  -KH        Patient/Family/Caregiver Comments/Observations cousin present  -KH        Patient Effort good  -KH        Symptoms Noted During/After  Treatment none  -           General Information    Patient Profile Reviewed yes  -KH        Pertinent History Of Current Problem See initial eval  -        Precautions/Limitations, Vision WFL with corrective lenses;for purposes of eval  -KH        Precautions/Limitations, Hearing WFL;for purposes of eval  -        Patient Level of Education Pt reports he has a doctorate in art and is a retired   -        Prior Level of Function-Communication WFL  -        Plans/Goals Discussed with patient;family;agreed upon  -        Barriers to Rehab none identified  -           Pain    Additional Documentation Pain Scale: FACES Pre/Post-Treatment (Group)  -KH           Pain Scale: FACES Pre/Post-Treatment    Pain: FACES Scale, Pretreatment 0-->no hurt  -KH        Posttreatment Pain Rating 0-->no hurt  -           Comprehension Assessment/Intervention    Comprehension Assessment/Intervention Auditory Comprehension  -           Auditory Comprehension Assessment/Intervention    Auditory Comprehension (Communication) [facility]  -        Able to Identify Objects/Pictures (Communication) familiar objects;pictures of common objects;[facility]  -        Answers Questions (Communication) yes/no;wh questions;[facility]  -        Able to Follow Commands (Communication) 1-step;L  -        Narrative Discourse conversational level;L  -           Expression Assessment/Intervention    Expression Assessment/Intervention verbal expression  -           Verbal Expression Assessment/Intervention    Verbal Expression [facility]  -        Automatic Speech (Communication) response to greeting;L  -        Repetition words;sentences;[facility]  -        Phrase Completion automatic/predictable;[facility]  -        Responsive Naming simple;complex;L  -        Confrontational Naming high frequency;L  -        Spontaneous/Functional Words L  -        Sentence Formulation [facility]  -        Conversational Discourse/Fluency L  -            Oral Motor Structure and Function    Oral Motor Structure and Function [facility]  -           Oral Musculature and Cranial Nerve Assessment    Oral Motor General Assessment generalized oral motor weakness  -           Motor Speech Assessment/Intervention    Motor Speech Function L  -           Cognitive Assessment Intervention- SLP    Cognitive Function (Cognition) mild impairment  -KH        Orientation Status (Cognition) awareness of basic personal information;person;place;situation;WFL;time;mild impairment  -KH        Memory (Cognitive) simple;WFL;mod-complex;delayed;mild impairment  -KH        Attention (Cognitive) sustained;WFL  -KH        Thought Organization (Cognitive) concrete divergent;mild impairment  -KH        Reasoning (Cognitive) simple;mild impairment  -KH        Problem Solving (Cognitive) simple;temporal;mild impairment;moderate impairment  -KH        Functional Math (Cognitive) simple;moderate impairment  -        Executive Function (Cognition) realistic goal setting;deficit awareness;mild impairment  -        Pragmatics (Communication) WFL  -           SLP Evaluation Clinical Impressions    SLP Diagnosis mild;cognitive-linguistic disorder  -        Rehab Potential/Prognosis good  -        SLC Criteria for Skilled Therapy Interventions Met yes  -        Functional Impact functional impact in social situations;functional impact in ADLs  -           Recommendations    Therapy Frequency (SLP SLC) 3 days per week  -        Predicted Duration Therapy Intervention (Days) until discharge  -        Anticipated Discharge Disposition (SLP) unknown;anticipate therapy at next level of care  -                  User Key  (r) = Recorded By, (t) = Taken By, (c) = Cosigned By      Initials Name Effective Dates    Danitza Kim, MS CCC-SLP 01/15/24 -                        EDUCATION  The patient has been educated in the following areas:     Cognitive Impairment Communication  Impairment Dysphagia (Swallowing Impairment).           SLP GOALS       Row Name 03/23/24 1422             Patient will demonstrate functional cognitive-linguistic skills for return to discharge environment    [place] Independently  -      Time frame by discharge  -         Orientation Goal 1 (SLP)    Improve Orientation Through Goal 1 (SLP) demonstrating orientation to day;demonstrating orientation to month;demonstrating orientation to year;demonstrating orientation to place;demonstrating orientation to disease/impairment;use environmental aids to assist with orientation;90%;independently (over 90% accuracy)  -      Time Frame (Orientation Goal 1, SLP) short term goal (STG);by discharge  -         Memory Skills Goal 1 (SLP)    Improve Memory Skills Through Goal 1 (SLP) recalling related word lists with an imposed delay;recalling unrelated word lists with an imposed delay;use memory strategies;80%;with minimal cues (75-90%)  -      Time Frame (Memory Skills Goal 1, SLP) short term goal (STG);by discharge  -         Organizational Skills Goal 1 (SLP)    Improve Thought Organization Through Goal 1 (SLP) completing a divergent naming task;completing a convergent naming task;80%;with minimal cues (75-90%)  -      Time Frame (Thought Organization Skills Goal 1, SLP) short term goal (STG);by discharge  -         Reasoning Goal 1 (SLP)    Improve Reasoning Through Goal 1 (SLP) complete basic reasoning task;complete high level reasoning task;80%;with minimal cues (75-90%)  -      Time Frame (Reasoning Goal 1, SLP) short term goal (STG);by discharge  -         Functional Problem Solving Skills Goal 1 (SLP)    Improve Problem Solving Through Goal 1 (SLP) determine solutions to simple ADL/safety problems;determine solutions to complex problems;80%;with minimal cues (75-90%)  -      Time Frame (Problem Solving Goal 1, SLP) short term goal (STG);by discharge  -         Executive Functional Skills  Goal 1 (SLP)    Improve Executive Function Skills Goal 1 (SLP) demonstrate awareness of deficit;identify strategies, strengths, limitations;identify anticipated needs;80%;with minimal cues (75-90%)  -      Time Frame (Executive Function Skills Goal 1, SLP) short term goal (STG);by discharge  -                User Key  (r) = Recorded By, (t) = Taken By, (c) = Cosigned By      Initials Name Provider Type    Danitza Kim MS CCC-SLP Speech and Language Pathologist                            Time Calculation:      Time Calculation- SLP       Row Name 03/23/24 1727             Time Calculation- SLP    SLP Start Time 1423  -KH      SLP Received On 03/23/24  -KH         Untimed Charges    SLP Eval/Re-eval  ST Eval Oral Pharyng Swallow - 51147;ST Eval Speech and Production w/ Language - 31353  -KH      72865-QD Eval Speech and Production w/ Language Minutes 60  -KH      31093-EZ Eval Oral Pharyng Swallow Minutes 40  -KH         Total Minutes    Untimed Charges Total Minutes 100  -KH       Total Minutes 100  -KH                User Key  (r) = Recorded By, (t) = Taken By, (c) = Cosigned By      Initials Name Provider Type    Danitza Kim MS CCC-SLP Speech and Language Pathologist                    Therapy Charges for Today       Code Description Service Date Service Provider Modifiers Qty    89128432837 HC ST EVAL ORAL PHARYNG SWALLOW 3 3/23/2024 Danitza Garcia MS CCC-SLP GN 1    [id number] HC ST EVAL SPEECH AND PROD W LANG  4 3/23/2024 Danitza Garcia MS CCC-SLP GN 1                       MS BAIRON Flores  3/23/2024    Electronically signed by Danitza Garcia MS CCC-SLP at 03/23/24 6542

## 2024-03-26 NOTE — CASE MANAGEMENT/SOCIAL WORK
Continued Stay Note  [facility]     Patient Name: Patrice Howard  MRN: [id number]  Today's Date: 3/26/2024    Admit Date: 3/20/2024    Plan: [facility] swing bed,   Discharge Plan       Row Name 03/26/24 1107       Plan    Plan [facility] swing bed,    Plan Comments Insurance is in process for [facility] bed and have discussed in MDR and with patient and his son    Final Discharge Disposition Code 61 - hospital-based swing bed                   Discharge Codes    No documentation.                 Expected Discharge Date and Time       Expected Discharge Date Expected Discharge Time    Mar 26, 2024               Ciara Disla RN

## 2024-03-26 NOTE — PLAN OF CARE
Goal Outcome Evaluation:  Plan of Care Reviewed With: patient, son        Progress: improving  Outcome Evaluation: Pt with significant improvement in balance, transfers, and ambulation this date. Pt required less assist for all mobility tasks. Pt continue to demo deficits in static and dynamic standing balance and will continue to benefit from PT to address these ongoing deficits.

## 2024-03-26 NOTE — PROGRESS NOTES
[facility] Medicine Services  PROGRESS NOTE    Patient Name: Patrice Howard  : 1945  MRN: [id number]    Date of Admission: 3/20/2024  Primary Care Physician: Delano Song DO    Subjective   Subjective     CC:  Follow-up for alcohol drawl    HPI:  Saw patient walking with PT.  Doing well without complaints.     Objective   Objective     Vital Signs:   Temp:  [97.6 °F (36.4 °C)-98.1 °F (36.7 °C)] 98.1 °F (36.7 °C)  Heart Rate:  [60-75] 75  Resp:  [16-18] 16  BP: (134-167)/(79-94) 134/79     Physical Exam:  Constitutional: No acute distress, awake, alert, up walking with PT  HENT: NCAT, mucous membranes moist  Respiratory: Clear to auscultation bilaterally, respiratory effort normal   Cardiovascular: RRR, no murmurs, rubs, or gallops  Gastrointestinal: Positive bowel sounds, soft, nontender, nondistended  Musculoskeletal: No bilateral ankle edema  Psychiatric: Appropriate affect, cooperative  Neurologic: Oriented x 3, moves all extremities, Cranial Nerves grossly intact to confrontation, speech clear  Skin: No rashes      Results Reviewed:  LAB RESULTS:      Lab 24  0336 24  0819 24  0458 24  0700 24  1253   WBC 4.75 5.56 4.93 7.09 8.14   HEMOGLOBIN 12.5* 13.1 13.3 12.5* 14.1   HEMATOCRIT 35.2* 37.0* 36.4* 36.1* 40.1   PLATELETS 139* 125* 106* 118* 154   NEUTROS ABS 2.25 3.10 3.39 4.02 5.62   IMMATURE GRANS (ABS) 0.02 0.02 0.02 0.02 0.03   LYMPHS ABS 1.41 1.26 0.70 1.68 1.23   MONOS ABS 0.74 0.94* 0.77 1.34* 1.22*   EOS ABS 0.30 0.22 0.04 0.01 0.01   .8* 103.1* 100.8* 108.4* 104.2*   APTT  --   --   --   --  28.0         Lab 24  0336 24  0819 247 24  0458 24  0700 24  1249   SODIUM 135* 135*  --  130* 132*  --    POTASSIUM 3.8 4.3 3.6 3.5 3.7  --    CHLORIDE 103 102  --  95* 98  --    CO2 24.0 23.0  --  24.0 22.0  --    ANION GAP 8.0 10.0  --  11.0 12.0  --    BUN 11 6*  --  10 11  --    CREATININE  0.68* 0.66*  --  0.69* 0.81 0.90   EGFR 95.1 96.0  --  94.7 90.2 87.4   GLUCOSE 120* 101*  --  153* 96  --    CALCIUM 9.1 8.9  --  8.5* 8.3*  --    MAGNESIUM 1.8 1.7  --  1.7  --   --    PHOSPHORUS  --  2.8  --  2.6  --   --    HEMOGLOBIN A1C  --   --   --   --  5.30  --    TSH  --   --   --   --  0.733  --          Lab 03/23/24  0819 03/22/24  0458 03/21/24  0700 03/20/24  1253   TOTAL PROTEIN 5.6* 6.0 5.9*  --    ALBUMIN 3.5 3.9 3.9  --    GLOBULIN 2.1 2.1 2.0  --    ALT (SGPT) 15 15 17 27   AST (SGOT) 21 19 19 27   BILIRUBIN 0.7 1.0 1.0  --    ALK PHOS 70 80 64  --          Lab 03/20/24  1253   HSTROP T 11         Lab 03/21/24  0700   CHOLESTEROL 131   LDL CHOL 50   HDL CHOL 63*   TRIGLYCERIDES 95         Lab 03/20/24  2225   FOLATE 9.98   VITAMIN B 12 279         Brief Urine Lab Results  (Last result in the past 365 days)        Color   Clarity   Blood   Leuk Est   Nitrite   Protein   CREAT   Urine HCG        03/20/24 1345 Yellow   Clear   Small (1+)   Negative   Negative   Negative                   Microbiology Results Abnormal       Procedure Component Value - Date/Time    COVID PRE-OP / PRE-PROCEDURE SCREENING ORDER (NO ISOLATION) - Swab, Nasopharynx [[id number]]  (Normal) Collected: 03/20/24 1346    Lab Status: Final result Specimen: Swab from Nasopharynx Updated: 03/20/24 1446    Narrative:      The following orders were created for panel order COVID PRE-OP / PRE-PROCEDURE SCREENING ORDER (NO ISOLATION) - Swab, Nasopharynx.  Procedure                               Abnormality         Status                     ---------                               -----------         ------                     Respiratory Panel PCR w/...[[id number]]  Normal              Final result                 Please view results for these tests on the individual orders.    Respiratory Panel PCR w/COVID-19(SARS-CoV-2) [facility]/BRIGID/EMILY/PAD/COR/[facility]OFIA In-House, NP Swab in UTM/VTM, 2 HR TAT - Swab, Nasopharynx [[id number]]  (Normal) Collected:  03/20/24 1346    Lab Status: Final result Specimen: Swab from Nasopharynx Updated: 03/20/24 1446     ADENOVIRUS, PCR Not Detected     Coronavirus 229E Not Detected     Coronavirus HKU1 Not Detected     Coronavirus NL63 Not Detected     Coronavirus OC43 Not Detected     COVID19 Not Detected     Human Metapneumovirus Not Detected     Human Rhinovirus/Enterovirus Not Detected     Influenza A PCR Not Detected     Influenza B PCR Not Detected     Parainfluenza Virus 1 Not Detected     Parainfluenza Virus 2 Not Detected     Parainfluenza Virus 3 Not Detected     Parainfluenza Virus 4 Not Detected     RSV, PCR Not Detected     Bordetella pertussis pcr Not Detected     Bordetella parapertussis PCR Not Detected     Chlamydophila pneumoniae PCR Not Detected     Mycoplasma pneumo by PCR Not Detected    Narrative:      In the setting of a positive respiratory panel with a viral infection PLUS a negative procalcitonin without other underlying concern for bacterial infection, consider observing off antibiotics or discontinuation of antibiotics and continue supportive care. If the respiratory panel is positive for atypical bacterial infection (Bordetella pertussis, Chlamydophila pneumoniae, or Mycoplasma pneumoniae), consider antibiotic de-escalation to target atypical bacterial infection.            No radiology results from the last 24 hrs    Results for orders placed during the hospital encounter of 03/20/24    Adult Transthoracic Echo Complete W/ Cont if Necessary Per Protocol (With Agitated Saline)    Interpretation Summary    Left ventricular systolic function is normal. Left ventricular ejection fraction appears to be 56 - 60%.    Left atrial volume is normal. No evidence of a patent foramen ovale. Saline test results are negative.    Trace mitral valve regurgitation is present.    Trace tricuspid valve regurgitation is present. Estimated right ventricular systolic pressure from tricuspid regurgitation is normal (<35  mmHg).    Mild dilation of the ascending aorta is present. Ascending aorta = 4.2 cm      Current medications:  Scheduled Meds:aspirin, 81 mg, Oral, Daily  FLUoxetine, 20 mg, Oral, Daily  [START ON 3/27/2024] folic acid, 1 mg, Oral, Daily  insulin lispro, 2-7 Units, Subcutaneous, 4x Daily AC & at Bedtime  losartan, 50 mg, Oral, Daily  nicotine, 1 patch, Transdermal, Q24H  oxazepam, 15 mg, Oral, Q12H  pantoprazole, 40 mg, Oral, BID AC  QUEtiapine, 50 mg, Oral, Nightly  senna-docusate sodium, 2 tablet, Oral, BID  sodium chloride, 10 mL, Intravenous, Q12H  thiamine, 100 mg, Oral, Daily  vitamin B-12, 1,000 mcg, Oral, Daily      Continuous Infusions:     PRN Meds:.  acetaminophen    acetaminophen    senna-docusate sodium **AND** polyethylene glycol **AND** bisacodyl **AND** bisacodyl    Calcium Replacement - Follow Nurse / BPA Driven Protocol    dextrose    dextrose    glucagon (human recombinant)    Magnesium Standard Dose Replacement - Follow Nurse / BPA Driven Protocol    ondansetron ODT    Phosphorus Replacement - Follow Nurse / BPA Driven Protocol    Potassium Replacement - Follow Nurse / BPA Driven Protocol    prochlorperazine **OR** prochlorperazine **OR** prochlorperazine    sodium chloride    sodium chloride    ziprasidone    Assessment & Plan   Assessment & Plan     Active Hospital Problems    Diagnosis  POA    **Aphasia [R47.01]  Yes    Moderate malnutrition [E44.0]  Yes    Fever [R50.9]  Yes    Acute metabolic encephalopathy [G93.41]  Yes    Alcohol use [Z78.9]  Unknown    History of prostate cancer [Z85.46]  Not Applicable    HTN (hypertension) [I10]  Unknown      Resolved Hospital Problems   No resolved problems to display.        Brief Hospital Course to date:  Patrice Howard is a 78 y.o. male with history of type 2 diabetes, ongoing tobacco and alcohol use, history of prostate cancer status post resection, essential hypertension, who presented to the hospital with slurred speech and left-sided  weakness    TIA  Acute stroke, ruled out  Presented with left-sided weakness and slurred speech concerning for acute stroke  Stroke imaging studies including CT head and MRI brain negative  Continue aspirin and statins  Neurology team following repeated MRI brain on 3/23/2024 because of left hemineglect but MRI was unremarkable  No plan for LP given his improved mental state which is likely secondary to alcohol withdrawal and nutritional deficiencies    Acute metabolic encephalopathy, improving  Alcohol withdrawal, improving  B12 deficiency  Drinks at least 3 double shots of bourbon every night  Lives alone and independent with ADLs but feeling depressed and admits to using alcohol to treat his depression  Continue CIWA protocol.  Discontinued IV benzo  wean Serax 15mg 3 times daily to serax 15mg BID  Seroquel 50 mg HS  S/P high-dose IV thiamine, currently on PO  Continue IM B12 supplement.  Will need prescription upon discharge  Extensive discussion with the patient and his son at bedside regarding alcohol dependence and withdrawal    Possible alcohol induced gastritis  Nausea and vomiting for a week with poor oral intake.  Currently improved  Continue Protonix    Possible UTI, POA  Has urinary symptoms with dysuria and urgency  Status post p.o. cefdinir    Hypoosmolar hyponatremia  Improved with IV fluids.  Encourage p.o. intake    Vitamin B12 deficiency  B12 level is low at 270  Continue IM replacement.    Essential pretension  Restarted losartan    Type 2 diabetes  A1c 5.3%  SSI    Hx of prostate cancer  Status post resection  on Lupron     Depression  Started Prozac    Severe debility  PT and OT consultation  Inpatient rehab upon discharge.  Case management on board    Expected Discharge Location and Transportation: Inpatient rehab/BG pending insurance, plan transfer in AM  Expected Discharge   Expected Discharge Date: 3/27/2024; Expected Discharge Time:      DVT prophylaxis:  Mechanical DVT prophylaxis orders  are present.         AM-PAC 6 Clicks Score (PT): 18 (03/26/24 1005)    CODE STATUS:   Code Status and Medical Interventions:   Ordered at: 03/20/24 1422     Code Status (Patient has no pulse and is not breathing):    CPR (Attempt to Resuscitate)     Medical Interventions (Patient has pulse or is breathing):    Full Support     Copied text in this note has been reviewed and is accurate as of 03/26/24.     Satish Quintanilla MD  03/26/24

## 2024-03-26 NOTE — THERAPY TREATMENT NOTE
Patient Name: Patrice Howard  : 1945    MRN: [id number]                              Today's Date: 3/26/2024       Admit Date: 3/20/2024    Visit Dx:     ICD-10-CM ICD-9-CM   1. Altered mental status, unspecified altered mental status type  R41.82 780.97   2. Difficulty with speech  R47.9 784.59   3. Dysphagia, unspecified type  R13.10 787.20     Patient Active Problem List   Diagnosis    Aphasia    Alcohol use    History of prostate cancer    HTN (hypertension)    Fever    Acute metabolic encephalopathy    Moderate malnutrition     Past Medical History:   Diagnosis Date    Diabetes     Elevated PSA     Prostate cancer      History reviewed. No pertinent surgical history.   General Information       Row Name 24 1009          OT Time and Intention    Document Type therapy note (daily note)  -     Mode of Treatment occupational therapy  -       Row Name 24 1009          General Information    Patient Profile Reviewed yes  -     Existing Precautions/Restrictions fall  -     Barriers to Rehab medically complex  -       Row Name 24 1009          Safety Issues, Functional Mobility    Safety Issues Affecting Function (Mobility) awareness of need for assistance;insight into deficits/self-awareness;safety precaution awareness  -     Impairments Affecting Function (Mobility) balance;endurance/activity tolerance;strength;visual/perceptual  -               User Key  (r) = Recorded By, (t) = Taken By, (c) = Cosigned By      Initials Name Provider Type    Jadyn Jeong OT Occupational Therapist                     Mobility/ADL's       Row Name 24 1009          Bed Mobility    Supine-Sit [place] (Bed Mobility) verbal cues;supervision  -[facility] Name 24 1009          Transfers    Transfers sit-stand transfer;stand-sit transfer  -       Row Name 24 1009          Sit-Stand Transfer    Sit-Stand [place] (Transfers) contact guard;verbal cues  -     Assistive  Device (Sit-Stand Transfers) other (see comments)  HHA  -       Row Name 03/26/24 1009          Stand-Sit Transfer    Stand-Sit [place] (Transfers) verbal cues;contact guard  -KL     Assistive Device (Stand-Sit Transfers) other (see comments)  HHA  -       Row Name 03/26/24 1009          Activities of Daily Living    BADL Assessment/Intervention grooming;lower body dressing  -       Row Name 03/26/24 1009          Lower Body Dressing Assessment/Training    [place] Level (Lower Body Dressing) don;socks;supervision  -     Position (Lower Body Dressing) edge of bed sitting  -       Row Name 03/26/24 1009          Grooming Assessment/Training    [place] Level (Grooming) oral care regimen;contact guard assist  -     Position (Grooming) sink side  -               User Key  (r) = Recorded By, (t) = Taken By, (c) = Cosigned By      Initials Name Provider Type    Jadyn Jeong OT Occupational Therapist                   Obj/Interventions       Row Name 03/26/24 1011          Balance    Static Sitting Balance independent  -KL     Dynamic Sitting Balance standby assist  -KL     Position, Sitting Balance unsupported;sitting edge of bed  -     Static Standing Balance contact guard  -KL     Dynamic Standing Balance contact guard  -KL     Position/Device Used, Standing Balance unsupported  -     Balance Interventions sitting;standing;sit to stand;supported;static;dynamic;occupation based/functional task  -               User Key  (r) = Recorded By, (t) = Taken By, (c) = Cosigned By      Initials Name Provider Type    Jadyn Jeong OT Occupational Therapist                   Goals/Plan    No documentation.                  Clinical Impression       Row Name 03/26/24 1011          Pain Assessment    Pretreatment Pain Rating 0/10 - no pain  -KL     Posttreatment Pain Rating 0/10 - no pain  -KL       Row Name 03/26/24 1011          Plan of Care Review    Plan of Care Reviewed With patient;son   -KL     Progress improving  -     Outcome Evaluation Pt demo significant improvement in mobility, self-care and t/fs during therapy session. Pt continues to demo decreased activity tolerance and CGA d/t static and dynamic balance. Will continue w/ current POC. d/c rec continues to be IPR.  -       Row Name 03/26/24 1011          Therapy Plan Review/Discharge Plan (OT)    Anticipated Discharge Disposition (OT) inpatient rehabilitation facility  -       Row Name 03/26/24 1011          Vital Signs    Pre Systolic BP Rehab 155  -KL     Pre Treatment Diastolic BP 96  -KL     Pretreatment Heart Rate (beats/min) 65  -KL     Pre SpO2 (%) 98  -KL     O2 Delivery Pre Treatment room air  -KL     Pre Patient Position Supine  -KL     Intra Patient Position Standing  -KL     Post Patient Position Sitting  -       Row Name 03/26/24 1011          Positioning and Restraints    Pre-Treatment Position in bed  -KL     Post Treatment Position chair  -KL     In Chair notified nsg;reclined;sitting;call light within reach;encouraged to call for assist;exit alarm on;with family/caregiver;waffle cushion;legs elevated  -               User Key  (r) = Recorded By, (t) = Taken By, (c) = Cosigned By      Initials Name Provider Type    Jadyn Jeong, CHEIKH Occupational Therapist                   Outcome Measures       Row Name 03/26/24 1015          How much help from another is currently needed...    Putting on and taking off regular lower body clothing? 3  -KL     Bathing (including washing, rinsing, and drying) 3  -KL     Toileting (which includes using toilet bed pan or urinal) 3  -KL     Putting on and taking off regular upper body clothing 4  -KL     Taking care of personal grooming (such as brushing teeth) 4  -KL     Eating meals 4  -KL     AM-PAC 6 Clicks Score (OT) 21  -KL       Row Name 03/26/24 1005 03/26/24 0901       How much help from another person do you currently need...    Turning from your back to your side while in  flat bed without using bedrails? 3  -KR 3  -AS    Moving from lying on back to sitting on the side of a flat bed without bedrails? 3  -KR 3  -AS    Moving to and from a bed to a chair (including a wheelchair)? 3  -KR 3  -AS    Standing up from a chair using your arms (e.g., wheelchair, bedside chair)? 3  -KR 4  -AS    Climbing 3-5 steps with a railing? 3  -KR 2  -AS    To walk in hospital room? 3  -KR 3  -AS    AM-PAC 6 Clicks Score (PT) 18  -KR 18  -AS    Highest Level of Mobility Goal 6 --> Walk 10 steps or more  -KR 6 --> Walk 10 steps or more  -AS      Row Name 03/26/24 1015          Functional Assessment    Outcome Measure Options AM-PAC 6 Clicks Daily Activity (OT)  -ANH               User Key  (r) = Recorded By, (t) = Taken By, (c) = Cosigned By      Initials Name Provider Type    Jeanne Purvis, PT Physical Therapist    AS Julissa Velasquez, RN Registered Nurse    Jadyn Jeong, OT Occupational Therapist                    Occupational Therapy Education       Title: PT OT SLP Therapies (In Progress)       Topic: Occupational Therapy (In Progress)       Point: ADL training (Done)       Description:   Instruct learner(s) on proper safety adaptation and remediation techniques during self care or transfers.   Instruct in proper use of assistive devices.                  Learning Progress Summary             Patient Acceptance, E, VU,NR by ANH at 3/26/2024 1015    Acceptance, E,D, VU,DU by  at 3/21/2024 1044                         Point: Home exercise program (Not Started)       Description:   Instruct learner(s) on appropriate technique for monitoring, assisting and/or progressing therapeutic exercises/activities.                  Learner Progress:  Not documented in this visit.              Point: Precautions (Done)       Description:   Instruct learner(s) on prescribed precautions during self-care and functional transfers.                  Learning Progress Summary             Patient Acceptance, E, VU,NR  by  at 3/26/2024 1015    Acceptance, E,D, VU,DU by  at 3/21/2024 1044                         Point: Body mechanics (Done)       Description:   Instruct learner(s) on proper positioning and spine alignment during self-care, functional mobility activities and/or exercises.                  Learning Progress Summary             Patient Acceptance, E, VU,NR by  at 3/26/2024 1015    Acceptance, E,D, VU,DU by  at 3/21/2024 1044                                         User Key       Initials Effective Dates Name Provider Type Discipline     06/16/21 -  Jairo Dle Valle OT Occupational Therapist OT     02/05/24 -  Jadyn Yates OT Occupational Therapist OT                  OT Recommendation and Plan     Plan of Care Review  Plan of Care Reviewed With: patient, son  Progress: improving  Outcome Evaluation: Pt demo significant improvement in mobility, self-care and t/fs during therapy session. Pt continues to demo decreased activity tolerance and CGA d/t static and dynamic balance. Will continue w/ current POC. d/c rec continues to be IPR.     Time Calculation:         Time Calculation- OT       Row Name 03/26/24 1016             Time Calculation- OT    OT Start Time 0913  -KL      OT Received On 03/26/24  -         Timed Charges    62805 - OT Self Care/Mgmt Minutes 18  -KL         Total Minutes    Timed Charges Total Minutes 18  -KL       Total Minutes 18  -KL                User Key  (r) = Recorded By, (t) = Taken By, (c) = Cosigned By      Initials Name Provider Type    Jadyn Jeong OT Occupational Therapist                  Therapy Charges for Today       Code Description Service Date Service Provider Modifiers Qty    [id number]  OT SELF CARE/MGMT/TRAIN EA 15 MIN 3/26/2024 Jadyn Yates OT GO 1                 Jadyn Yates OT  3/26/2024

## 2024-03-26 NOTE — THERAPY TREATMENT NOTE
Patient Name: Patrice Howard  : 1945    MRN: [id number]                              Today's Date: 3/26/2024       Admit Date: 3/20/2024    Visit Dx:     ICD-10-CM ICD-9-CM   1. Altered mental status, unspecified altered mental status type  R41.82 780.97   2. Difficulty with speech  R47.9 784.59   3. Dysphagia, unspecified type  R13.10 787.20     Patient Active Problem List   Diagnosis    Aphasia    Alcohol use    History of prostate cancer    HTN (hypertension)    Fever    Acute metabolic encephalopathy    Moderate malnutrition     Past Medical History:   Diagnosis Date    Diabetes     Elevated PSA     Prostate cancer      History reviewed. No pertinent surgical history.   General Information       Row Name 24 0959          Physical Therapy Time and Intention    Document Type therapy note (daily note)  -KR     Mode of Treatment physical therapy;individual therapy  -KR       Row Name 24 0959          General Information    Patient Profile Reviewed yes  -KR     Existing Precautions/Restrictions fall  -KR     Barriers to Rehab medically complex  -KR       Row Name 2459          Safety Issues, Functional Mobility    Safety Issues Affecting Function (Mobility) safety precaution awareness;insight into deficits/self-awareness;awareness of need for assistance  -KR     Impairments Affecting Function (Mobility) balance;endurance/activity tolerance;strength;visual/perceptual  -KR               User Key  (r) = Recorded By, (t) = Taken By, (c) = Cosigned By      Initials Name Provider Type    KR Jeanne Unger, PT Physical Therapist                   Mobility       Row Name 24 0959          Sit-Stand Transfer    Sit-Stand [place] (Transfers) contact guard  -KR     Comment, (Sit-Stand Transfer) 3x from chair  -KR       Row Name 24 0959          Gait/Stairs (Locomotion)    [place] Level (Gait) contact guard  -KR     Distance in Feet (Gait) 50  50 + 50 + 20 + 20  -KR      "Deviations/Abnormal Patterns (Gait) stride length decreased;weight shifting decreased;gait speed decreased  -KR     Bilateral Gait Deviations forward flexed posture;heel strike decreased  -KR     Comment, (Gait/Stairs) slow, intentional pace, no LOB.  -KR               User Key  (r) = Recorded By, (t) = Taken By, (c) = Cosigned By      Initials Name Provider Type    KR Jeanne Unger PT Physical Therapist                   Obj/Interventions       Row Name 03/26/24 1001          Motor Skills    Additional Documentation Advanced Stepping/Walking Interventions (Group)  -KR       Row Name 03/26/24 1001          Balance    Balance Assessment sitting static balance;sitting dynamic balance;standing static balance;standing dynamic balance  -KR     Static Sitting Balance independent  -KR     Dynamic Sitting Balance standby assist  -KR     Position, Sitting Balance unsupported;sitting in chair  -KR     Static Standing Balance contact guard  -KR     Dynamic Standing Balance contact guard  -KR     Position/Device Used, Standing Balance unsupported  -KR     Balance Interventions sitting;standing;sit to stand;static;dynamic;other (see comments)  30\" WBOS E.C. CGA, 30\" NBOS E.C. CGA, 30\" semi-tandem LLE forward E.C. [place], 30\" semi-tandem RLE forward E.C. Hermelinda  -KR       Row Name 03/26/24 1001          Advanced Stepping/Walking Interventions    Stepping/Walking Interventions backward walking;braiding;side stepping;tandem walking  -KR     Backward Walking (Stepping/Walking Interventions) 10' unilateral UE support, 10' unsupported CGA.  -KR     Braiding (Stepping/Walking Interventions) 10' B with BUE support and max cues for sequencing CGA  -KR     Tandem Walking (Stepping/Walking Interventions) 20' with unilateral UE support CGA  -KR     Side Stepping (Stepping/Walking Interventions) 10' B with BUE support CGA  -KR               User Key  (r) = Recorded By, (t) = Taken By, (c) = Cosigned By      Initials Name Provider Type    KR " Jeanne Unger, PT Physical Therapist                   Goals/Plan    No documentation.                  Clinical Impression       Row Name 03/26/24 1004          Pain    Pretreatment Pain Rating 0/10 - no pain  -KR     Posttreatment Pain Rating 0/10 - no pain  -KR       Row Name 03/26/24 1004          Plan of Care Review    Plan of Care Reviewed With patient;son  -KR     Progress improving  -KR     Outcome Evaluation Pt with significant improvement in balance, transfers, and ambulation this date. Pt required less assist for all mobility tasks. Pt continue to demo deficits in static and dynamic standing balance and will continue to benefit from PT to address these ongoing deficits.  -KR       Row Name 03/26/24 1004          Therapy Assessment/Plan (PT)    Rehab Potential (PT) good, to achieve stated therapy goals  -KR     Criteria for Skilled Interventions Met (PT) yes;meets criteria;skilled treatment is necessary  -KR     Therapy Frequency (PT) daily  -KR       Row Name 03/26/24 1004          Vital Signs    Pre Patient Position Sitting  -KR     Intra Patient Position Standing  -KR     Post Patient Position Sitting  -KR       Row Name 03/26/24 1004          Positioning and Restraints    Pre-Treatment Position sitting in chair/recliner  -KR     Post Treatment Position chair  -KR     In Chair notified nsg;reclined;call light within reach;encouraged to call for assist;exit alarm on;waffle cushion;legs elevated;with family/caregiver  -KR               User Key  (r) = Recorded By, (t) = Taken By, (c) = Cosigned By      Initials Name Provider Type    KR Jeanne Unger, PT Physical Therapist                   Outcome Measures       Row Name 03/26/24 1005 03/26/24 0901       How much help from another person do you currently need...    Turning from your back to your side while in flat bed without using bedrails? 3  -KR 3  -AS    Moving from lying on back to sitting on the side of a flat bed without bedrails? 3  -KR 3  -AS     Moving to and from a bed to a chair (including a wheelchair)? 3  -KR 3  -AS    Standing up from a chair using your arms (e.g., wheelchair, bedside chair)? 3  -KR 4  -AS    Climbing 3-5 steps with a railing? 3  -KR 2  -AS    To walk in hospital room? 3  -KR 3  -AS    AM-PAC 6 Clicks Score (PT) 18  -KR 18  -AS    Highest Level of Mobility Goal 6 --> Walk 10 steps or more  -KR 6 --> Walk 10 steps or more  -AS      Row Name 03/26/24 1015          Functional Assessment    Outcome Measure Options AM-PAC 6 Clicks Daily Activity (OT)  -ANH               User Key  (r) = Recorded By, (t) = Taken By, (c) = Cosigned By      Initials Name Provider Type    Jeanne Puvris, PT Physical Therapist    AS Julissa Velasquez, RN Registered Nurse    Jadyn Jeong, OT Occupational Therapist                                 Physical Therapy Education       Title: PT OT SLP Therapies (In Progress)       Topic: Physical Therapy (Done)       Point: Mobility training (Done)       Learning Progress Summary             Patient Acceptance, E, VU by KR at 3/26/2024 1006    Acceptance, E, VU,NR by CD at 3/22/2024 1621    Comment: SEE FLOWSHEET    Acceptance, E, VU,NR by CD at 3/21/2024 1544    Comment: BENEFITS OF OOB ACTIVITY, SAFETY WITH MOBILITY, PROGRESSION OF POC, D/C PLANNING,   Family Acceptance, E, VU,NR by CD at 3/21/2024 1544    Comment: BENEFITS OF OOB ACTIVITY, SAFETY WITH MOBILITY, PROGRESSION OF POC, D/C PLANNING,                         Point: Home exercise program (Done)       Learning Progress Summary             Patient Acceptance, E, VU by KR at 3/26/2024 1006    Acceptance, E, VU,NR by CD at 3/22/2024 1621    Comment: SEE FLOWSHEET    Acceptance, E, VU,NR by CD at 3/21/2024 1544    Comment: BENEFITS OF OOB ACTIVITY, SAFETY WITH MOBILITY, PROGRESSION OF POC, D/C PLANNING,   Family Acceptance, E, VU,NR by CD at 3/21/2024 1544    Comment: BENEFITS OF OOB ACTIVITY, SAFETY WITH MOBILITY, PROGRESSION OF POC, D/C PLANNING,                          Point: Body mechanics (Done)       Learning Progress Summary             Patient Acceptance, E, VU by KR at 3/26/2024 1006    Acceptance, E, VU,NR by CD at 3/22/2024 1621    Comment: SEE FLOWSHEET    Acceptance, E, VU,NR by CD at 3/21/2024 1544    Comment: BENEFITS OF OOB ACTIVITY, SAFETY WITH MOBILITY, PROGRESSION OF POC, D/C PLANNING,   Family Acceptance, E, VU,NR by CD at 3/21/2024 1544    Comment: BENEFITS OF OOB ACTIVITY, SAFETY WITH MOBILITY, PROGRESSION OF POC, D/C PLANNING,                         Point: Precautions (Done)       Learning Progress Summary             Patient Acceptance, E, VU by KR at 3/26/2024 1006    Acceptance, E, VU,NR by CD at 3/22/2024 1621    Comment: SEE FLOWSHEET    Acceptance, E, VU,NR by CD at 3/21/2024 1544    Comment: BENEFITS OF OOB ACTIVITY, SAFETY WITH MOBILITY, PROGRESSION OF POC, D/C PLANNING,   Family Acceptance, E, VU,NR by CD at 3/21/2024 1544    Comment: BENEFITS OF OOB ACTIVITY, SAFETY WITH MOBILITY, PROGRESSION OF POC, D/C PLANNING,                                         User Key       Initials Effective Dates Name Provider Type Discipline    CD 02/03/23 -  Antonieta Sousa PT Physical Therapist PT    KR 12/30/22 -  Jeanne Unger PT Physical Therapist PT                  PT Recommendation and Plan     Plan of Care Reviewed With: patient, son  Progress: improving  Outcome Evaluation: Pt with significant improvement in balance, transfers, and ambulation this date. Pt required less assist for all mobility tasks. Pt continue to demo deficits in static and dynamic standing balance and will continue to benefit from PT to address these ongoing deficits.     Time Calculation:         PT Charges       Row Name 03/26/24 1007             Time Calculation    Start Time 0931  -KR      PT Received On 03/26/24  -KR         Timed Charges    84649 -  PT Neuromuscular Reeducation Minutes 13  -KR      54776 - PT Therapeutic Activity Minutes 10  -KR         Total  Minutes    Timed Charges Total Minutes 23  -KR       Total Minutes 23  -KR                User Key  (r) = Recorded By, (t) = Taken By, (c) = Cosigned By      Initials Name Provider Type    Jeanne Purvis PT Physical Therapist                  Therapy Charges for Today       Code Description Service Date Service Provider Modifiers Qty    26008677517  PT NEUROMUSC RE EDUCATION EA 15 MIN 3/26/2024 Jeanne Unger, PT GP 1    57591905739 HC PT THERAPEUTIC ACT EA 15 MIN 3/26/2024 Jeanne Unger, PT GP 1            PT G-Codes  Outcome Measure Options: AM-PAC 6 Clicks Daily Activity (OT)  AM-PAC 6 Clicks Score (PT): 18  AM-PAC 6 Clicks Score (OT): 21  Modified [place] Scale: 4 - Moderately severe disability.  Unable to walk without assistance, and unable to attend to own bodily needs without assistance.  PT Discharge Summary  Anticipated Discharge Disposition (PT): inpatient rehabilitation facility    Jeanne Unger PT  3/26/2024

## 2024-03-27 ENCOUNTER — READMISSION MANAGEMENT (OUTPATIENT)
Dept: CALL CENTER | Facility: HOSPITAL | Age: 79
End: 2024-03-27
Payer: MEDICARE

## 2024-03-27 VITALS
TEMPERATURE: 98.3 F | HEIGHT: 67 IN | RESPIRATION RATE: 19 BRPM | SYSTOLIC BLOOD PRESSURE: 152 MMHG | OXYGEN SATURATION: 98 % | BODY MASS INDEX: 21.18 KG/M2 | WEIGHT: 134.92 LBS | HEART RATE: 66 BPM | DIASTOLIC BLOOD PRESSURE: 86 MMHG

## 2024-03-27 LAB
GLUCOSE BLDC GLUCOMTR-MCNC: 187 MG/DL (ref 70–130)
GLUCOSE BLDC GLUCOMTR-MCNC: 96 MG/DL (ref 70–130)

## 2024-03-27 PROCEDURE — 63710000001 INSULIN LISPRO (HUMAN) PER 5 UNITS: Performed by: INTERNAL MEDICINE

## 2024-03-27 PROCEDURE — 92507 TX SP LANG VOICE COMM INDIV: CPT

## 2024-03-27 PROCEDURE — 82948 REAGENT STRIP/BLOOD GLUCOSE: CPT

## 2024-03-27 PROCEDURE — 99239 HOSP IP/OBS DSCHRG MGMT >30: CPT | Performed by: INTERNAL MEDICINE

## 2024-03-27 RX ORDER — PANTOPRAZOLE SODIUM 40 MG/1
40 TABLET, DELAYED RELEASE ORAL DAILY
Qty: 30 TABLET | Refills: 2 | Status: SHIPPED | OUTPATIENT
Start: 2024-03-27 | End: 2024-06-25

## 2024-03-27 RX ORDER — LOSARTAN POTASSIUM 50 MG/1
50 TABLET ORAL DAILY
Qty: 30 TABLET | Refills: 0 | Status: SHIPPED | OUTPATIENT
Start: 2024-03-28

## 2024-03-27 RX ORDER — FOLIC ACID 1 MG/1
1 TABLET ORAL DAILY
Qty: 100 TABLET | Refills: 3 | Status: SHIPPED | OUTPATIENT
Start: 2024-03-27 | End: 2025-03-27

## 2024-03-27 RX ORDER — LOSARTAN POTASSIUM 50 MG/1
50 TABLET ORAL DAILY
Qty: 30 TABLET | Refills: 11 | Status: SHIPPED | OUTPATIENT
Start: 2024-03-27 | End: 2025-03-27

## 2024-03-27 RX ORDER — OXAZEPAM 15 MG/1
15 CAPSULE ORAL DAILY
Qty: 3 CAPSULE | Refills: 0 | Status: SHIPPED | OUTPATIENT
Start: 2024-03-27

## 2024-03-27 RX ORDER — ASPIRIN 81 MG/1
81 TABLET ORAL DAILY
Qty: 30 TABLET | Refills: 2 | Status: SHIPPED | OUTPATIENT
Start: 2024-03-27 | End: 2024-06-25

## 2024-03-27 RX ORDER — LANOLIN ALCOHOL/MO/W.PET/CERES
100 CREAM (GRAM) TOPICAL DAILY
Qty: 30 TABLET | Refills: 2 | Status: SHIPPED | OUTPATIENT
Start: 2024-03-27 | End: 2024-06-25

## 2024-03-27 RX ADMIN — OXAZEPAM 15 MG: 15 CAPSULE, GELATIN COATED ORAL at 08:54

## 2024-03-27 RX ADMIN — THIAMINE HCL TAB 100 MG 100 MG: 100 TAB at 08:54

## 2024-03-27 RX ADMIN — INSULIN LISPRO 2 UNITS: 100 INJECTION, SOLUTION INTRAVENOUS; SUBCUTANEOUS at 11:36

## 2024-03-27 RX ADMIN — ASPIRIN 81 MG: 81 TABLET, COATED ORAL at 08:54

## 2024-03-27 RX ADMIN — FOLIC ACID 1 MG: 1 TABLET ORAL at 08:54

## 2024-03-27 RX ADMIN — Medication 10 ML: at 08:54

## 2024-03-27 RX ADMIN — LOSARTAN POTASSIUM 50 MG: 50 TABLET, FILM COATED ORAL at 08:54

## 2024-03-27 RX ADMIN — FLUOXETINE 20 MG: 20 CAPSULE ORAL at 08:54

## 2024-03-27 RX ADMIN — PANTOPRAZOLE SODIUM 40 MG: 40 TABLET, DELAYED RELEASE ORAL at 08:54

## 2024-03-27 RX ADMIN — Medication 1000 MCG: at 08:54

## 2024-03-27 NOTE — THERAPY TREATMENT NOTE
Acute Care - Speech Language Pathology Treatment Note  [facility]     Patient Name: Patrice Howard  : 1945  MRN: [id number]  Today's Date: 3/27/2024               Admit Date: 3/20/2024     Visit Dx:    ICD-10-CM ICD-9-CM   1. Altered mental status, unspecified altered mental status type  R41.82 780.97   2. Difficulty with speech  R47.9 784.59   3. Dysphagia, unspecified type  R13.10 787.20   4. Alcohol use  Z78.9 V49.89   5. Moderate malnutrition  E44.0 263.0   6. Acute metabolic encephalopathy  G93.41 348.31   7. History of prostate cancer  Z85.46 V10.46   8. Aphasia  R47.01 784.3     Patient Active Problem List   Diagnosis    Aphasia    Alcohol use    History of prostate cancer    HTN (hypertension)    Fever    Acute metabolic encephalopathy    Moderate malnutrition     Past Medical History:   Diagnosis Date    Diabetes     Elevated PSA     Prostate cancer      History reviewed. No pertinent surgical history.    SLP Recommendation and Plan                 Anticipated Discharge Disposition (SLP): home with home health, home with OP services (24 1510)        Therapy Frequency (SLP SLC): 5 days per week (24 1510)  Predicted Duration Therapy Intervention (Days): until discharge (24 1510)        Daily Summary of Progress (SLP): progress toward functional goals is good (24 1510)           Treatment Assessment (SLP): continued, moderate, cognitive-linguistic disorder (24 1510)     Plan for Continued Treatment (SLP): continue treatment per plan of care (24 1510)  Plan of Care Reviewed With: patient, son (24 9316)      SLP EVALUATION (Last 72 Hours)       SLP SLC Evaluation       Row Name 24 1510                   Communication Assessment/Intervention    Document Type therapy note (daily note)  -MP        Subjective Information no complaints  -MP        Patient Observations alert;cooperative  -MP        Patient/Family/Caregiver Comments/Observations Son present   -MP        Patient Effort good  -MP           Pain    Additional Documentation Pain Scale: FACES Pre/Post-Treatment (Group)  -MP           Pain Scale: FACES Pre/Post-Treatment    Pain: FACES Scale, Pretreatment 0-->no hurt  -MP        Posttreatment Pain Rating 0-->no hurt  -MP           SLP Treatment Clinical Impressions    Treatment Assessment (SLP) continued;moderate;cognitive-linguistic disorder  -MP        Daily Summary of Progress (SLP) progress toward functional goals is good  -MP        Plan for Continued Treatment (SLP) continue treatment per plan of care  -MP        Care Plan Review care plan/treatment goals reviewed;patient/other agree to care plan  -MP           Recommendations    Therapy Frequency (SLP SLC) 5 days per week  -MP        Predicted Duration Therapy Intervention (Days) until discharge  -MP        Anticipated Discharge Disposition (SLP) home with home health;home with OP services  -MP                  User Key  (r) = Recorded By, (t) = Taken By, (c) = Cosigned By      Initials Name Effective Dates    MP Jeffrey Luo, MS CCC-SLP 12/28/21 -                        EDUCATION  The patient has been educated in the following areas:     Cognitive Impairment Communication Impairment.           SLP GOALS       Row Name 03/27/24 1510             Patient will demonstrate functional cognitive-linguistic skills for return to discharge environment    [place] Independently  -MP      Time frame by discharge  -MP      Progress/Outcomes continuing progress toward goal  -MP         Orientation Goal 1 (SLP)    Improve Orientation Through Goal 1 (SLP) demonstrating orientation to day;demonstrating orientation to month;demonstrating orientation to year;demonstrating orientation to place;demonstrating orientation to disease/impairment;use environmental aids to assist with orientation;90%;independently (over 90% accuracy)  -MP      Time Frame (Orientation Goal 1, SLP) short term goal (STG);by discharge   -MP      Progress (Orientation Goal 1, SLP) 60%;independently (over 90% accuracy)  -MP      Progress/Outcomes (Orientation Goal 1, SLP) good progress toward goal  -MP         Memory Skills Goal 1 (SLP)    Improve Memory Skills Through Goal 1 (SLP) recalling unrelated word lists immediately;80%;with minimal cues (75-90%)  -MP      Time Frame (Memory Skills Goal 1, SLP) short term goal (STG);by discharge  -MP      Progress/Outcomes (Memory Skills Goal 1, SLP) goal revised this date  -MP      Comment (Memory Skills Goal 1, SLP) U/a to recall words immediately during this tx session  -MP         Organizational Skills Goal 1 (SLP)    Improve Thought Organization Through Goal 1 (SLP) completing a divergent naming task;completing a convergent naming task;80%;with minimal cues (75-90%)  -MP      Time Frame (Thought Organization Skills Goal 1, SLP) short term goal (STG);by discharge  -MP      Progress/Outcomes (Thought Organization Skills Goal 1, SLP) goal ongoing  -MP         Reasoning Goal 1 (SLP)    Improve Reasoning Through Goal 1 (SLP) complete basic reasoning task;complete high level reasoning task;80%;with minimal cues (75-90%)  -MP      Time Frame (Reasoning Goal 1, SLP) short term goal (STG);by discharge  -MP      Progress (Reasoning Goal 1, SLP) 70%;with minimal cues (75-90%)  -MP      Progress/Outcomes (Reasoning Goal 1, SLP) continuing progress toward goal  -MP         Functional Problem Solving Skills Goal 1 (SLP)    Improve Problem Solving Through Goal 1 (SLP) determine solutions to simple ADL/safety problems;determine solutions to complex problems;80%;with minimal cues (75-90%)  -MP      Time Frame (Problem Solving Goal 1, SLP) short term goal (STG);by discharge  -MP      Progress/Outcomes (Problem Solving Goal 1, SLP) goal ongoing  -MP         Functional Math Skills Goal 1 (SLP)    Improve Functional Math Skills Through Goal 1 (SLP) complete word problems involving time;complete word problems involving  money;80%;with minimal cues (75-90%)  -MP      Time Frame (Functional Math Skills Goal 1, SLP) short term goal (STG)  -MP      Progress/Outcomes (Functional Math Skills Goal 1, SLP) new goal  -MP         Executive Functional Skills Goal 1 (SLP)    Improve Executive Function Skills Goal 1 (SLP) demonstrate awareness of deficit;identify strategies, strengths, limitations;identify anticipated needs;80%;with minimal cues (75-90%)  -MP      Time Frame (Executive Function Skills Goal 1, SLP) short term goal (STG);by discharge  -MP      Progress (Executive Function Skills Goal 1, SLP) 50%;with minimal cues (75-90%)  -MP      Progress/Outcomes (Executive Function Skills Goal 1, SLP) continuing progress toward goal  -MP                User Key  (r) = Recorded By, (t) = Taken By, (c) = Cosigned By      Initials Name Provider Type    Jeffrey Boone MS CCC-SLP Speech and Language Pathologist                            Time Calculation:      Time Calculation- SLP       Row Name 03/27/24 1541             Time Calculation- SLP    SLP Start Time 1510  -MP      SLP Received On 03/27/24  -MP         Untimed Charges    62977-DR Treatment/ST Modification Prosth Aug Alter  40  -MP         Total Minutes    Untimed Charges Total Minutes 40  -MP       Total Minutes 40  -MP                User Key  (r) = Recorded By, (t) = Taken By, (c) = Cosigned By      Initials Name Provider Type    Jeffrey Boone MS CCC-SLP Speech and Language Pathologist                    Therapy Charges for Today       Code Description Service Date Service Provider Modifiers Qty    [id number]  ST TREATMENT SPEECH 3 3/27/2024 Jeffrey Luo MS CCC-SLP GN 1                       MS BAIRON Jensen  3/27/2024

## 2024-03-27 NOTE — DISCHARGE SUMMARY
[facility] Medicine Services  DISCHARGE SUMMARY    Patient Name: Patrice Howard  : 1945  MRN: [id number]    Date of Admission: 3/20/2024 12:48 PM  Date of Discharge:  3/27/2024  Primary Care Physician: Delano Song DO    Consults       Date and Time Order Name Status Description    3/21/2024  7:06 AM Inpatient Neurology Consult General Completed             Hospital Course     Presenting Problem:     Active Hospital Problems    Diagnosis  POA    **Aphasia [R47.01]  Yes    Moderate malnutrition [E44.0]  Yes    Fever [R50.9]  Yes    Acute metabolic encephalopathy [G93.41]  Yes    Alcohol use [Z78.9]  Unknown    History of prostate cancer [Z85.46]  Not Applicable    HTN (hypertension) [I10]  Unknown      Resolved Hospital Problems   No resolved problems to display.          Hospital Course:  Patrice Howard is a 78 y.o. male with history of type 2 diabetes, ongoing tobacco and alcohol use, history of prostate cancer status post resection, essential hypertension, who presented to the hospital with slurred speech and left-sided weakness     TIA  Acute stroke, ruled out  Presented with left-sided weakness and slurred speech concerning for acute stroke  Stroke imaging studies including CT head and MRI brain negative  Continue aspirin and statins  Neurology team following repeated MRI brain on 3/23/2024 because of left hemineglect but MRI was unremarkable  No plan for LP given his improved mental state which is likely secondary to alcohol withdrawal and nutritional deficiencies     Acute metabolic encephalopathy, improving  Alcohol withdrawal, improving  B12 deficiency  Drinks at least 3 double shots of bourbon every night  Lives alone and independent with ADLs but feeling depressed and admits to using alcohol to treat his depression  S/p CIWA protocol.  Discontinued IV benzo  wean 15mg daily for 3 more days  Seroquel 50 mg HS while here, stop at home  S/P high-dose IV thiamine,  currently on PO  Continue IM B12 supplement.  Will need prescription upon discharge  Extensive discussion with the patient and his son at bedside regarding alcohol dependence and withdrawal     Possible alcohol induced gastritis  Nausea and vomiting for a week with poor oral intake.  Currently improved  Continue Protonix     Possible UTI, POA  Has urinary symptoms with dysuria and urgency  Status post p.o. cefdinir     Hypoosmolar hyponatremia  Improved with IV fluids.  Encourage p.o. intake     Vitamin B12 deficiency  B12 level is low at 270  Continue IM replacement.     Essential pretension  Restarted losartan     Type 2 diabetes  A1c 5.3%  SSI     Hx of prostate cancer  Status post resection  on Lupron     Depression  Started Prozac     Severe debility  PT and OT consultation  Improved with PT here and now patient wishes to go home.  Discussed with son that would recommend that family stay with patient for a few days to make sure doing ok by himself      Discharge Follow Up Recommendations for outpatient labs/diagnostics:   F/u with PCP in 1 week    Day of Discharge     HPI:   Without complaints.  Doing well.  Required less assist with PT today.  Patient decided wishes to go home rather than wait for rehab    Review of Systems  Gen- No fevers, chills  CV- No chest pain, palpitations  Resp- No cough, dyspnea  GI- No N/V/D, abd pain      Vital Signs:   Temp:  [98 °F (36.7 °C)-99.2 °F (37.3 °C)] 98.3 °F (36.8 °C)  Heart Rate:  [59-94] 66  Resp:  [16-19] 19  BP: (139-171)/(84-97) 152/86      Physical Exam:  Constitutional: No acute distress, awake, alert, sitting up in chair, son at bedside  HENT: NCAT, mucous membranes moist  Respiratory: Clear to auscultation bilaterally, respiratory effort normal   Cardiovascular: RRR, no murmurs, rubs, or gallops  Gastrointestinal: Positive bowel sounds, soft, nontender, nondistended  Musculoskeletal: No bilateral ankle edema  Psychiatric: Appropriate affect,  cooperative  Neurologic: Oriented x 3, strength symmetric in all extremities, Cranial Nerves grossly intact to confrontation, speech clear  Skin: No rashes      Pertinent  and/or Most Recent Results     LAB RESULTS:      Lab 03/25/24 0336 03/23/24 0819 03/22/24 0458 03/21/24  0700   WBC 4.75 5.56 4.93 7.09   HEMOGLOBIN 12.5* 13.1 13.3 12.5*   HEMATOCRIT 35.2* 37.0* 36.4* 36.1*   PLATELETS 139* 125* 106* 118*   NEUTROS ABS 2.25 3.10 3.39 4.02   IMMATURE GRANS (ABS) 0.02 0.02 0.02 0.02   LYMPHS ABS 1.41 1.26 0.70 1.68   MONOS ABS 0.74 0.94* 0.77 1.34*   EOS ABS 0.30 0.22 0.04 0.01   .8* 103.1* 100.8* 108.4*         Lab 03/25/24  0336 03/23/24 0819 03/22/24 2037 03/22/24 0458 03/21/24  0700   SODIUM 135* 135*  --  130* 132*   POTASSIUM 3.8 4.3 3.6 3.5 3.7   CHLORIDE 103 102  --  95* 98   CO2 24.0 23.0  --  24.0 22.0   ANION GAP 8.0 10.0  --  11.0 12.0   BUN 11 6*  --  10 11   CREATININE 0.68* 0.66*  --  0.69* 0.81   EGFR 95.1 96.0  --  94.7 90.2   GLUCOSE 120* 101*  --  153* 96   CALCIUM 9.1 8.9  --  8.5* 8.3*   MAGNESIUM 1.8 1.7  --  1.7  --    PHOSPHORUS  --  2.8  --  2.6  --    HEMOGLOBIN A1C  --   --   --   --  5.30   TSH  --   --   --   --  0.733         Lab 03/23/24 0819 03/22/24 0458 03/21/24  0700   TOTAL PROTEIN 5.6* 6.0 5.9*   ALBUMIN 3.5 3.9 3.9   GLOBULIN 2.1 2.1 2.0   ALT (SGPT) 15 15 17   AST (SGOT) 21 19 19   BILIRUBIN 0.7 1.0 1.0   ALK PHOS 70 80 64             Lab 03/21/24  0700   CHOLESTEROL 131   LDL CHOL 50   HDL CHOL 63*   TRIGLYCERIDES 95         Lab 03/20/24  2225   FOLATE 9.98   VITAMIN B 12 279         Brief Urine Lab Results  (Last result in the past 365 days)        Color   Clarity   Blood   Leuk Est   Nitrite   Protein   CREAT   Urine HCG        03/20/24 1345 Yellow   Clear   Small (1+)   Negative   Negative   Negative                 Microbiology Results (last 10 days)       Procedure Component Value - Date/Time    COVID PRE-OP / PRE-PROCEDURE SCREENING ORDER (NO ISOLATION) -  Swab, Nasopharynx [[id number]]  (Normal) Collected: 03/20/24 1346    Lab Status: Final result Specimen: Swab from Nasopharynx Updated: 03/20/24 1446    Narrative:      The following orders were created for panel order COVID PRE-OP / PRE-PROCEDURE SCREENING ORDER (NO ISOLATION) - Swab, Nasopharynx.  Procedure                               Abnormality         Status                     ---------                               -----------         ------                     Respiratory Panel PCR w/...[[id number]]  Normal              Final result                 Please view results for these tests on the individual orders.    Respiratory Panel PCR w/COVID-19(SARS-CoV-2) [facility] In-House, NP Swab in UTM/VTM, 2 HR TAT - Swab, Nasopharynx [[id number]]  (Normal) Collected: 03/20/24 1346    Lab Status: Final result Specimen: Swab from Nasopharynx Updated: 03/20/24 1446     ADENOVIRUS, PCR Not Detected     Coronavirus 229E Not Detected     Coronavirus HKU1 Not Detected     Coronavirus NL63 Not Detected     Coronavirus OC43 Not Detected     COVID19 Not Detected     Human Metapneumovirus Not Detected     Human Rhinovirus/Enterovirus Not Detected     Influenza A PCR Not Detected     Influenza B PCR Not Detected     Parainfluenza Virus 1 Not Detected     Parainfluenza Virus 2 Not Detected     Parainfluenza Virus 3 Not Detected     Parainfluenza Virus 4 Not Detected     RSV, PCR Not Detected     Bordetella pertussis pcr Not Detected     Bordetella parapertussis PCR Not Detected     Chlamydophila pneumoniae PCR Not Detected     Mycoplasma pneumo by PCR Not Detected    Narrative:      In the setting of a positive respiratory panel with a viral infection PLUS a negative procalcitonin without other underlying concern for bacterial infection, consider observing off antibiotics or discontinuation of antibiotics and continue supportive care. If the respiratory panel is positive for atypical bacterial infection (Bordetella  pertussis, Chlamydophila pneumoniae, or Mycoplasma pneumoniae), consider antibiotic de-escalation to target atypical bacterial infection.            MRI Brain With & Without Contrast    Result Date: 3/24/2024  MRI BRAIN W WO CONTRAST Date of Exam: 3/23/2024 9:16 PM EDT Indication: Stroke, follow up.  Comparison: None available. Technique:  Routine multiplanar/multisequence sequence images of the brain were obtained before and after the uneventful administration of 10 mL Multihance. Findings: There is mild generalized parenchymal volume loss. There is no diffusion restriction to suggest acute infarct. There is no evidence of acute or chronic intracranial hemorrhage.  No mass effect or midline shift. No abnormal extra-axial collections. There are patchy areas of FLAIR hyperintense signal within the cerebral white matter. No new signal abnormalities within the brain parenchyma. The major vascular flow voids appear intact.  The basal ganglia, brainstem and cerebellum appear within normal limits.  Calvarial and superficial soft tissue signal is within normal limits.  Orbits appear unremarkable. There is mild ethmoid and maxillary sinus mucosal disease. The mastoid air cells appear well aerated. Midline structures are intact.  There is no abnormal intracranial enhancement. Postcontrast images are limited by motion. Prominent central canal in the upper cervical cord partially visualized.     Impression: 1.No acute intracranial abnormality. 2.Mild chronic small vessel ischemic change and mild generalized parenchymal volume loss. 3.Mild paranasal sinus mucosal disease. Electronically Signed: Jeanmarie Hairston MD  3/24/2024 12:39 AM EDT  Workstation ID: [id number]    Adult Transthoracic Echo Complete W/ Cont if Necessary Per Protocol (With Agitated Saline)    Result Date: 3/22/2024    Left ventricular systolic function is normal. Left ventricular ejection fraction appears to be 56 - 60%.   Left atrial volume is normal. No evidence  of a patent foramen ovale. Saline test results are negative.   Trace mitral valve regurgitation is present.   Trace tricuspid valve regurgitation is present. Estimated right ventricular systolic pressure from tricuspid regurgitation is normal (<35 mmHg).   Mild dilation of the ascending aorta is present. Ascending aorta = 4.2 cm     EEG    Result Date: 3/21/2024  Reason for referral: 78 y.o.male with altered mental status Technical Summary:  A 19 channel digital EEG was performed using the international 10-20 placement system, including eye leads and EKG leads. Duration: 21 minute Findings: The patient is resting quietly in a chair and appears asleep.  The background shows diffuse low amplitude theta activity with intermixed sleep spindles present.  IV pole artifact is variably present.  Toward the latter half of the tracing, the patient rouses and is clearly awake.  There is an increase in faster alpha and theta frequencies over both hemispheres.  No lateralizing features are seen.  No epileptiform activity or electrographic seizures are present.  Hyperventilation and photic stimulation are not performed. Video: Available Technical quality: Superior EKG: Regular, 60 bpm SUMMARY: Normal EEG in the awake and asleep states No focal features or epileptiform activity are seen     Normal study This report is transcribed using the Dragon dictation system.      MRI Brain With & Without Contrast    Result Date: 3/21/2024  MRI BRAIN W WO CONTRAST Date of Exam: 3/21/2024 1:43 AM EDT Indication: Stroke, follow up.  Comparison: CT scan the head dated March 20, 2024 Technique:  Routine multiplanar/multisequence sequence images of the brain were obtained before and after the uneventful administration of 15 mL Multihance. Findings: There is mild diffuse atrophy. There are areas of increased T2 and FLAIR signal in the bilateral periventricular and subcortical white matter locations consistent with chronic microvascular ischemia.  There is no mass, mass effect or midline shift. There are no abnormal extra-axial fluid collections or areas of acute hemorrhage. The major intracranial flow voids are preserved. The diffusion weighted sequences are normal. Contrast-enhanced images are somewhat degraded by the motion artifact. The thin section MP rage images are severely degraded. Axial T1 postcontrast image fails to demonstrate pathologic contrast enhancement.     Impression: Atrophy and chronic microvascular ischemia. No acute intracranial process. Electronically Signed: Demetris Lozano MD  3/21/2024 4:42 AM EDT  Workstation ID: [id number]    CT Head Without Contrast    Result Date: 3/20/2024  CT HEAD WO CONTRAST Date of Exam: 3/20/2024 8:08 PM EDT Indication: Neurologic decline Neurologic decline. Comparison: CT performed on the same date Technique: Axial CT images were obtained of the head without contrast administration.  Automated exposure control and iterative construction methods were used. Findings: No intracranial hemorrhage. Gray-white matter differentiation is maintained without evidence of an acute infarction. Multiple foci of decreased attenuation are present within the subcortical, deep cerebral, and periventricular white matter consistent with chronic small vessel/microangiopathic ischemic changes. No extra-axial mass or collection. The ventricles and sulci are prominent commensurate with involutional changes. The posterior fossa appears grossly normal. Sellar and suprasellar structures are normal. Orbital and periorbital soft tissues are normal. The paranasal sinuses, ethmoid air cells, and mastoid air cells are aerated. The bony calvarium is intact.     Impression: No acute intracranial pathology. Electronically Signed: Cipriano Jones MD  3/20/2024 8:27 PM EDT  Workstation ID: [id number]    CT Angiogram Head w AI Analysis of LVO    Result Date: 3/20/2024  CT ANGIOGRAM HEAD W AI ANALYSIS OF LVO, CT CEREBRAL PERFUSION W WO CONTRAST, CT  ANGIOGRAM NECK Date of Exam: 3/20/2024 1:02 PM EDT Indication: Neuro deficit, acute stroke suspected Neuro deficit, acute stroke suspected. Comparison: None available. Technique: CTA of the head was performed after the uneventful intravenous administration of . Reconstructed coronal and sagittal images were also obtained. In addition, a 3-D volume rendered image was created for interpretation. Automated exposure control and iterative reconstruction methods were used. FINDINGS: Vascular Findings: Atherosclerotic plaque within the right carotid bifurcation and right carotid siphon. The right common carotid, internal carotid, middle cerebral, anterior cerebral, vertebral, and posterior cerebral arteries are patent without abrupt cut off or aneurysmal dilation. There is absence of the A1 segment of the right anterior cerebral artery. There is fetal origin of the right posterior cerebral artery. Atherosclerotic plaque within the left carotid bifurcation and left carotid siphon. The left common carotid, internal carotid, middle cerebral, anterior cerebral, vertebral, and posterior cerebral arteries are patent without abrupt cut off or aneurysmal dilation. Basilar artery appears patent and appears unremarkable. Non-vascular Findings: For description of nonvascular intracranial findings, please refer to the noncontrast head CT performed the same date. No acute abnormality is identified within the visualized soft tissue or bony structures of the neck. Cervical spondylosis is present. The visualized lung apices are clear. CT Perfusion: CBF (<30%) volume: 0 mL Tmax (>6.0s) volume: 3 mL Mismatch volume: 3 mL Mismatch ratio: Infinite     1.No intracranial large vessel occlusion is identified. 2.No significant stenosis of the bilateral internal carotid arteries. 3.CT perfusion study demonstrates a tiny focus of prolonged Tmax greater than 6 seconds (3 mL) within the right occipital lobe. This could be artifactual. Tiny focus of  ischemia cannot be excluded. Please correlate with MRI. Electronically Signed: Maurice Lam MD  3/20/2024 1:48 PM EDT  Workstation ID: [id number]    CT Angiogram Neck    Result Date: 3/20/2024  CT ANGIOGRAM HEAD W AI ANALYSIS OF LVO, CT CEREBRAL PERFUSION W WO CONTRAST, CT ANGIOGRAM NECK Date of Exam: 3/20/2024 1:02 PM EDT Indication: Neuro deficit, acute stroke suspected Neuro deficit, acute stroke suspected. Comparison: None available. Technique: CTA of the head was performed after the uneventful intravenous administration of . Reconstructed coronal and sagittal images were also obtained. In addition, a 3-D volume rendered image was created for interpretation. Automated exposure control and iterative reconstruction methods were used. FINDINGS: Vascular Findings: Atherosclerotic plaque within the right carotid bifurcation and right carotid siphon. The right common carotid, internal carotid, middle cerebral, anterior cerebral, vertebral, and posterior cerebral arteries are patent without abrupt cut off or aneurysmal dilation. There is absence of the A1 segment of the right anterior cerebral artery. There is fetal origin of the right posterior cerebral artery. Atherosclerotic plaque within the left carotid bifurcation and left carotid siphon. The left common carotid, internal carotid, middle cerebral, anterior cerebral, vertebral, and posterior cerebral arteries are patent without abrupt cut off or aneurysmal dilation. Basilar artery appears patent and appears unremarkable. Non-vascular Findings: For description of nonvascular intracranial findings, please refer to the noncontrast head CT performed the same date. No acute abnormality is identified within the visualized soft tissue or bony structures of the neck. Cervical spondylosis is present. The visualized lung apices are clear. CT Perfusion: CBF (<30%) volume: 0 mL Tmax (>6.0s) volume: 3 mL Mismatch volume: 3 mL Mismatch ratio: Infinite     1.No intracranial  large vessel occlusion is identified. 2.No significant stenosis of the bilateral internal carotid arteries. 3.CT perfusion study demonstrates a tiny focus of prolonged Tmax greater than 6 seconds (3 mL) within the right occipital lobe. This could be artifactual. Tiny focus of ischemia cannot be excluded. Please correlate with MRI. Electronically Signed: Maurice Lam MD  3/20/2024 1:48 PM EDT  Workstation ID: [id number]    CT CEREBRAL PERFUSION WITH & WITHOUT CONTRAST    Result Date: 3/20/2024  CT ANGIOGRAM HEAD W AI ANALYSIS OF LVO, CT CEREBRAL PERFUSION W WO CONTRAST, CT ANGIOGRAM NECK Date of Exam: 3/20/2024 1:02 PM EDT Indication: Neuro deficit, acute stroke suspected Neuro deficit, acute stroke suspected. Comparison: None available. Technique: CTA of the head was performed after the uneventful intravenous administration of . Reconstructed coronal and sagittal images were also obtained. In addition, a 3-D volume rendered image was created for interpretation. Automated exposure control and iterative reconstruction methods were used. FINDINGS: Vascular Findings: Atherosclerotic plaque within the right carotid bifurcation and right carotid siphon. The right common carotid, internal carotid, middle cerebral, anterior cerebral, vertebral, and posterior cerebral arteries are patent without abrupt cut off or aneurysmal dilation. There is absence of the A1 segment of the right anterior cerebral artery. There is fetal origin of the right posterior cerebral artery. Atherosclerotic plaque within the left carotid bifurcation and left carotid siphon. The left common carotid, internal carotid, middle cerebral, anterior cerebral, vertebral, and posterior cerebral arteries are patent without abrupt cut off or aneurysmal dilation. Basilar artery appears patent and appears unremarkable. Non-vascular Findings: For description of nonvascular intracranial findings, please refer to the noncontrast head CT performed the same date. No  acute abnormality is identified within the visualized soft tissue or bony structures of the neck. Cervical spondylosis is present. The visualized lung apices are clear. CT Perfusion: CBF (<30%) volume: 0 mL Tmax (>6.0s) volume: 3 mL Mismatch volume: 3 mL Mismatch ratio: Infinite     1.No intracranial large vessel occlusion is identified. 2.No significant stenosis of the bilateral internal carotid arteries. 3.CT perfusion study demonstrates a tiny focus of prolonged Tmax greater than 6 seconds (3 mL) within the right occipital lobe. This could be artifactual. Tiny focus of ischemia cannot be excluded. Please correlate with MRI. Electronically Signed: Maurice Lam MD  3/20/2024 1:48 PM EDT  Workstation ID: [id number]    XR Chest 1 View    Result Date: 3/20/2024  XR CHEST 1 VW Date of Exam: 3/20/2024 1:18 PM EDT Indication: Acute Stroke Protocol (onset < 12 hrs) Comparison: CT chest September 12, 2023 Findings: The heart not definitely enlarged. The lungs seem clear. There are no pleural effusions. The visualized osseous structures do not appear unusual.     Impression: 1.An acute pulmonary process is not apparent. Electronically Signed: Roger Herring MD  3/20/2024 1:37 PM EDT  Workstation ID: [id number]    CT Head Without Contrast Stroke Protocol    Result Date: 3/20/2024  CT HEAD WO CONTRAST STROKE PROTOCOL Date of Exam: 3/20/2024 12:59 PM EDT Indication: Neuro deficit, acute, stroke suspected Neuro deficit, acute stroke suspected. Comparison: None available. Technique: Axial CT images were obtained of the head without contrast administration.  Reconstructed coronal images were also obtained. Automated exposure control and iterative construction methods were used. Scan Time: 1256 hours Results discussed with stroke team at 1259 hours. Findings: There is mild atrophy. There is mild periventricular hypodensity compatible with chronic small vessel ischemic insult. There is no acute mass effect or edema. There is  calcification of the right vertebral artery at the skull base. There is no acute mass effect or edema. There are no findings suspicious for acute CVA or hemorrhage. There is moderate polypoid mucosal thickening of the inferior maxillary sinuses.     Impression: Chronic findings. No acute process. Electronically Signed: Earnestine Ramirez MD  3/20/2024 1:09 PM EDT  Workstation ID: [id number]             Results for orders placed during the hospital encounter of 03/20/24    Adult Transthoracic Echo Complete W/ Cont if Necessary Per Protocol (With Agitated Saline)    Interpretation Summary    Left ventricular systolic function is normal. Left ventricular ejection fraction appears to be 56 - 60%.    Left atrial volume is normal. No evidence of a patent foramen ovale. Saline test results are negative.    Trace mitral valve regurgitation is present.    Trace tricuspid valve regurgitation is present. Estimated right ventricular systolic pressure from tricuspid regurgitation is normal (<35 mmHg).    Mild dilation of the ascending aorta is present. Ascending aorta = 4.2 cm      Plan for Follow-up of Pending Labs/Results:   Pending Labs       Order Current Status    Vitamin B1, Whole Blood In process          Discharge Details        Discharge Medications        New Medications        Instructions Start Date   cyanocobalamin 1000 MCG tablet  Commonly known as: [facility] Vitamin B-12   1,000 mcg, Oral, Daily      cyanocobalamin 1000 MCG tablet  Commonly known as: [facility] Vitamin B-12   1,000 mcg, Oral, Daily      FLUoxetine 20 MG capsule  Commonly known as: PROzac   20 mg, Oral, Daily      folic acid 1 MG tablet  Commonly known as: FOLVITE   1 mg, Oral, Daily      folic acid 1 MG tablet  Commonly known as: FOLVITE   1 mg, Oral, Daily      oxazepam 15 MG capsule  Commonly known as: SERAX   15 mg, Oral, Daily      pantoprazole 40 MG EC tablet  Commonly known as: Protonix   40 mg, Oral, Daily      pantoprazole 40 MG EC tablet  Commonly known  as: Protonix   40 mg, Oral, Daily      thiamine 100 MG tablet  Commonly known as: VITAMIN B1   100 mg, Oral, Daily      thiamine 100 MG tablet  Commonly known as: VITAMIN B1   100 mg, Oral, Daily             Changes to Medications        Instructions Start Date   aspirin 81 MG EC tablet  What changed: You were already taking a medication with the same name, and this prescription was added. Make sure you understand how and when to take each.   81 mg, Oral, Daily      aspirin 81 MG chewable tablet  What changed: Another medication with the same name was added. Make sure you understand how and when to take each.   Chew 1 tablet every day by oral route.      losartan 50 MG tablet  Commonly known as: Cozaar  What changed: You were already taking a medication with the same name, and this prescription was added. Make sure you understand how and when to take each.   50 mg, Oral, Daily      losartan 50 MG tablet  Commonly known as: Cozaar  What changed: You were already taking a medication with the same name, and this prescription was added. Make sure you understand how and when to take each.   50 mg, Oral, Daily      losartan 50 MG tablet  Commonly known as: COZAAR  What changed:   medication strength  how much to take   50 mg, Oral, Daily   Start Date: March 28, 2024            Continue These Medications        Instructions Start Date   Caltrate 600+D Plus Minerals 600-800 MG-UNIT tablet   1 tablet, Oral, Daily      metFORMIN 1000 MG tablet  Commonly known as: GLUCOPHAGE   1,000 mg, Oral      sildenafil 100 MG tablet  Commonly known as: Viagra   100 mg, Oral, As Needed      triamcinolone 0.025 % cream  Commonly known as: KENALOG   APPLY TOPICALLY TO THE AFFECTED AREA 2 TO 3 TIMES DAILY             Stop These Medications      lisinopril 20 MG tablet  Commonly known as: PRINIVIL,ZESTRIL              No Known Allergies      [place] [facility] [place]:  Hospital:  Diet Order   Procedures    Diet:  Regular/House; No Straw; Texture: Regular (IDDSI 7); Fluid Consistency: Thin (IDDSI 0)            Activity:      Restrictions or Other Recommendations:         CODE STATUS:    Code Status and Medical Interventions:   Ordered at: 03/20/24 1429     Code Status (Patient has no pulse and is not breathing):    CPR (Attempt to Resuscitate)     Medical Interventions (Patient has pulse or is breathing):    Full Support       Future Appointments   Date Time Provider Department Center   8/22/2024  9:30 AM Vasiliy Vega MD [facility] [place] [facility] ([facility]       Additional Instructions for the Follow-ups that You Need to Schedule       Discharge Follow-up with PCP   As directed       Currently Documented PCP:    Delano Song DO    PCP Phone Number:    [phone]     Follow Up Details: with PCP in 1 week                      Satish Quintanilla MD  03/27/24      Time Spent on Discharge:  I spent  37  minutes on this discharge activity which included: face-to-face encounter with the patient, reviewing the data in the system, coordination of the care with the nursing staff as well as consultants, documentation, and entering orders.

## 2024-03-27 NOTE — CASE MANAGEMENT/SOCIAL WORK
Case Management Discharge Note      Final Note: Patient plans for home. He was accepted at [facility]. declined by [facility] and the [facility]. We are waiting on insurance. His plans are for home with [facility] which has been accepted by [facility] with [facility]. His son and/or his family to provide oversite once patient returns home.         Selected Continued Care - Admitted Since 3/20/2024       Destination    No services have been selected for the patient.                Durable Medical Equipment    No services have been selected for the patient.                Dialysis/Infusion    No services have been selected for the patient.                Home Medical Care Coordination complete.      Service Provider Selected Services Address Phone Fax Patient Preferred    [facility] [address], [address], [place] [address] [phone] --              Therapy    No services have been selected for the patient.                Community Resources    No services have been selected for the patient.                Community & DME    No services have been selected for the patient.                         Final Discharge Disposition Code: 06 - home with home health care

## 2024-03-27 NOTE — PLAN OF CARE
Goal Outcome Evaluation:  Plan of Care Reviewed With: patient, son                  Anticipated Discharge Disposition (SLP): home with home health, home with OP services             Treatment Assessment (SLP): continued, moderate, cognitive-linguistic disorder (03/27/24 1510)     Plan for Continued Treatment (SLP): continue treatment per plan of care (03/27/24 1510)

## 2024-03-28 ENCOUNTER — READMISSION MANAGEMENT (OUTPATIENT)
Dept: CALL CENTER | Facility: HOSPITAL | Age: 79
End: 2024-03-28
Payer: MEDICARE

## 2024-03-28 LAB — VIT B1 BLD-SCNC: 292 NMOL/L (ref 66.5–200)

## 2024-03-28 NOTE — OUTREACH NOTE
Prep Survey      Flowsheet Row Responses   [facility] facility patient discharged from? [place]   Is LACE score < 7 ? No   Eligibility Readm Mgmt   Discharge diagnosis Aphasia, TIA  Acute stroke   Does the patient have one of the following disease processes/diagnoses(primary or secondary)? Stroke   Does the patient have Home health ordered? Yes   What is the Home health agency?  [facility]   Is there a DME ordered? No   Prep survey completed? Yes            Brianne HO - Registered Nurse

## 2024-03-28 NOTE — OUTREACH NOTE
Stroke Week 1 Survey      Flowsheet Row Responses   [facility] patient discharged from? [place]   Does the patient have one of the following disease processes/diagnoses(primary or secondary)? Stroke   Week 1 attempt successful? Yes   Call start time 1000   Call end time 1005   Discharge diagnosis Aphasia, TIA  Acute stroke   Meds reviewed with patient/caregiver? Yes   Is the patient having any side effects they believe may be caused by any medication additions or changes? No   Does the patient have all medications ordered at discharge? Yes   Is the patient taking all medications as directed (includes completed medication regime)? Yes   Does the patient have a primary care provider?  Yes   Has the patient kept scheduled appointments due by today? N/A   [facility] requests you follow up with them within 30 days for important follow up care. Please call [phone] to schedule this appointment. Thank you. Yes  [pt given contact info]   What is the Home health agency?  [facility]   Has home health visited the patient within 72 hours of discharge? Yes   Home health comments HH visiting today (3/28/24)   Does the patient require any assistance with activities of daily living such as eating, bathing, dressing, walking, etc.? No   Does the patient have any residual symptoms from stroke/TIA? No   Does the patient understand the diet ordered at discharge? Yes   Did the patient receive a copy of their discharge instructions? Yes   Nursing interventions Reviewed instructions with patient   What is the patient's perception of their health status since discharge? Returned to baseline/stable   Nursing interventions Nurse provided patient education   Is the patient/caregiver able to teach back the risk factors for a stroke? History of TIAs, High blood pressure-goal below 120/80   Is the patient/caregiver able to teach back signs and symptoms related to disease process for when  to call PCP? Yes   Is the patient/caregiver able to teach back signs and symptoms related to disease process for when to call 911? Yes   If the patient is a current smoker, are they able to teach back resources for cessation? [id number]   Is the patient/caregiver able to teach back the hierarchy of who to call/visit for symptoms/problems? PCP, Specialist, Home health nurse, Urgent Care, ED, 911 Yes   Is the patient able to teach back FAST for Stroke? B alance: Watch for sudden loss of balance, E yes: Check for vision loss, F ace: Look for an uneven smile, A rm: Check if one arm is weak, S peech: Listen for slurred speech, T nela: Call 9-1-1 right away   Week 1 call completed? Yes   Call end time 1005            Nancy H - Registered Nurse

## 2024-03-29 LAB
QT INTERVAL: 426 MS
QTC INTERVAL: 432 MS

## 2024-09-17 DIAGNOSIS — C61 PROSTATE CANCER: Primary | ICD-10-CM

## 2024-09-23 ENCOUNTER — OFFICE VISIT (OUTPATIENT)
Dept: UROLOGY | Facility: CLINIC | Age: 79
End: 2024-09-23
Payer: MEDICARE

## 2024-09-23 VITALS
TEMPERATURE: 98 F | HEART RATE: 68 BPM | SYSTOLIC BLOOD PRESSURE: 126 MMHG | OXYGEN SATURATION: 98 % | WEIGHT: 134 LBS | HEIGHT: 67 IN | DIASTOLIC BLOOD PRESSURE: 82 MMHG | BODY MASS INDEX: 21.03 KG/M2

## 2024-09-23 DIAGNOSIS — R41.3 MEMORY DIFFICULTIES: ICD-10-CM

## 2024-09-23 DIAGNOSIS — C61 PROSTATE CANCER: Primary | ICD-10-CM

## 2024-09-23 DIAGNOSIS — N52.9 ERECTILE DYSFUNCTION, UNSPECIFIED ERECTILE DYSFUNCTION TYPE: ICD-10-CM

## 2024-09-23 DIAGNOSIS — Z78.9 ALCOHOL USE: ICD-10-CM

## 2024-09-23 DIAGNOSIS — E44.0 MODERATE MALNUTRITION: ICD-10-CM

## 2024-09-23 PROCEDURE — 1160F RVW MEDS BY RX/DR IN RCRD: CPT | Performed by: UROLOGY

## 2024-09-23 PROCEDURE — 99214 OFFICE O/P EST MOD 30 MIN: CPT | Performed by: UROLOGY

## 2024-09-23 PROCEDURE — 3079F DIAST BP 80-89 MM HG: CPT | Performed by: UROLOGY

## 2024-09-23 PROCEDURE — 1159F MED LIST DOCD IN RCRD: CPT | Performed by: UROLOGY

## 2024-09-23 PROCEDURE — 96402 CHEMO HORMON ANTINEOPL SQ/IM: CPT | Performed by: UROLOGY

## 2024-09-23 PROCEDURE — 3074F SYST BP LT 130 MM HG: CPT | Performed by: UROLOGY

## 2025-03-19 ENCOUNTER — TELEPHONE (OUTPATIENT)
Dept: UROLOGY | Facility: CLINIC | Age: 80
End: 2025-03-19
Payer: MEDICARE

## 2025-04-25 ENCOUNTER — TELEPHONE (OUTPATIENT)
Dept: UROLOGY | Facility: CLINIC | Age: 80
End: 2025-04-25
Payer: MEDICARE

## 2025-04-25 NOTE — TELEPHONE ENCOUNTER
I called and spoke to patient and reminded him to get PSA lab done by Monday 4/28 for upcoming appt. Patient agreed to plan.